# Patient Record
Sex: MALE | Race: WHITE | Employment: FULL TIME | URBAN - NONMETROPOLITAN AREA
[De-identification: names, ages, dates, MRNs, and addresses within clinical notes are randomized per-mention and may not be internally consistent; named-entity substitution may affect disease eponyms.]

---

## 2018-06-26 ENCOUNTER — HOSPITAL ENCOUNTER (OUTPATIENT)
Dept: LAB | Age: 43
Setting detail: SPECIMEN
Discharge: HOME | End: 2018-06-26
Payer: COMMERCIAL

## 2018-06-26 ENCOUNTER — OFFICE VISIT (OUTPATIENT)
Dept: FAMILY MEDICINE CLINIC | Age: 43
End: 2018-06-26
Payer: COMMERCIAL

## 2018-06-26 VITALS
OXYGEN SATURATION: 98 % | WEIGHT: 229.2 LBS | HEIGHT: 72 IN | TEMPERATURE: 98.5 F | SYSTOLIC BLOOD PRESSURE: 122 MMHG | HEART RATE: 68 BPM | BODY MASS INDEX: 31.04 KG/M2 | DIASTOLIC BLOOD PRESSURE: 82 MMHG

## 2018-06-26 DIAGNOSIS — Z00.00 VISIT FOR WELL MAN HEALTH CHECK: Primary | ICD-10-CM

## 2018-06-26 DIAGNOSIS — Z13.220 SCREENING CHOLESTEROL LEVEL: ICD-10-CM

## 2018-06-26 DIAGNOSIS — Z13.1 SCREENING FOR DIABETES MELLITUS: ICD-10-CM

## 2018-06-26 DIAGNOSIS — Z11.4 SCREENING FOR HIV (HUMAN IMMUNODEFICIENCY VIRUS): ICD-10-CM

## 2018-06-26 DIAGNOSIS — M62.08 DIASTASIS RECTI: ICD-10-CM

## 2018-06-26 LAB
ANION GAP SERPL CALCULATED.3IONS-SCNC: 10 MMOL/L (ref 9–17)
BUN BLDV-MCNC: 9 MG/DL (ref 6–20)
BUN/CREAT BLD: 11 (ref 9–20)
CALCIUM SERPL-MCNC: 9.6 MG/DL (ref 8.6–10.4)
CHLORIDE BLD-SCNC: 99 MMOL/L (ref 98–107)
CHOLESTEROL/HDL RATIO: 7.5
CHOLESTEROL: 233 MG/DL
CO2: 32 MMOL/L (ref 20–31)
CREAT SERPL-MCNC: 0.82 MG/DL (ref 0.7–1.2)
ESTIMATED AVERAGE GLUCOSE: 103 MG/DL
GFR AFRICAN AMERICAN: >60 ML/MIN
GFR NON-AFRICAN AMERICAN: >60 ML/MIN
GFR SERPL CREATININE-BSD FRML MDRD: ABNORMAL ML/MIN/{1.73_M2}
GFR SERPL CREATININE-BSD FRML MDRD: ABNORMAL ML/MIN/{1.73_M2}
GLUCOSE BLD-MCNC: 94 MG/DL (ref 70–99)
HBA1C MFR BLD: 5.2 % (ref 4.8–5.9)
HDLC SERPL-MCNC: 31 MG/DL
HIV AG/AB: NONREACTIVE
LDL CHOLESTEROL: 145 MG/DL (ref 0–130)
POTASSIUM SERPL-SCNC: 4.2 MMOL/L (ref 3.7–5.3)
SODIUM BLD-SCNC: 141 MMOL/L (ref 135–144)
TRIGL SERPL-MCNC: 283 MG/DL
VLDLC SERPL CALC-MCNC: ABNORMAL MG/DL (ref 1–30)

## 2018-06-26 PROCEDURE — 83036 HEMOGLOBIN GLYCOSYLATED A1C: CPT

## 2018-06-26 PROCEDURE — 87389 HIV-1 AG W/HIV-1&-2 AB AG IA: CPT

## 2018-06-26 PROCEDURE — 99386 PREV VISIT NEW AGE 40-64: CPT | Performed by: FAMILY MEDICINE

## 2018-06-26 PROCEDURE — 80048 BASIC METABOLIC PNL TOTAL CA: CPT

## 2018-06-26 PROCEDURE — 80061 LIPID PANEL: CPT

## 2018-06-26 PROCEDURE — 36415 COLL VENOUS BLD VENIPUNCTURE: CPT

## 2018-06-26 ASSESSMENT — ENCOUNTER SYMPTOMS
WHEEZING: 0
SINUS PRESSURE: 0
DIARRHEA: 0
SHORTNESS OF BREATH: 0
BACK PAIN: 0
CONSTIPATION: 0
COUGH: 0
ABDOMINAL PAIN: 0
CHEST TIGHTNESS: 0

## 2018-06-26 ASSESSMENT — PATIENT HEALTH QUESTIONNAIRE - PHQ9
1. LITTLE INTEREST OR PLEASURE IN DOING THINGS: 0
SUM OF ALL RESPONSES TO PHQ QUESTIONS 1-9: 0
SUM OF ALL RESPONSES TO PHQ9 QUESTIONS 1 & 2: 0
2. FEELING DOWN, DEPRESSED OR HOPELESS: 0

## 2019-09-24 ENCOUNTER — OFFICE VISIT (OUTPATIENT)
Dept: FAMILY MEDICINE CLINIC | Age: 44
End: 2019-09-24
Payer: COMMERCIAL

## 2019-09-24 VITALS
BODY MASS INDEX: 31.86 KG/M2 | HEART RATE: 66 BPM | WEIGHT: 235.2 LBS | HEIGHT: 72 IN | DIASTOLIC BLOOD PRESSURE: 82 MMHG | SYSTOLIC BLOOD PRESSURE: 118 MMHG | OXYGEN SATURATION: 97 %

## 2019-09-24 DIAGNOSIS — Z13.1 SCREENING FOR DIABETES MELLITUS: ICD-10-CM

## 2019-09-24 DIAGNOSIS — Z13.220 SCREENING CHOLESTEROL LEVEL: ICD-10-CM

## 2019-09-24 DIAGNOSIS — Z00.00 VISIT FOR WELL MAN HEALTH CHECK: Primary | ICD-10-CM

## 2019-09-24 DIAGNOSIS — M72.2 PLANTAR FASCIITIS: ICD-10-CM

## 2019-09-24 PROCEDURE — 99396 PREV VISIT EST AGE 40-64: CPT | Performed by: FAMILY MEDICINE

## 2019-09-24 ASSESSMENT — ENCOUNTER SYMPTOMS
ABDOMINAL PAIN: 1
SHORTNESS OF BREATH: 0
DIARRHEA: 0
BACK PAIN: 0
CONSTIPATION: 0
WHEEZING: 0
CHEST TIGHTNESS: 0
COUGH: 0

## 2019-09-24 ASSESSMENT — PATIENT HEALTH QUESTIONNAIRE - PHQ9
SUM OF ALL RESPONSES TO PHQ QUESTIONS 1-9: 0
2. FEELING DOWN, DEPRESSED OR HOPELESS: 0
1. LITTLE INTEREST OR PLEASURE IN DOING THINGS: 0
SUM OF ALL RESPONSES TO PHQ9 QUESTIONS 1 & 2: 0
SUM OF ALL RESPONSES TO PHQ QUESTIONS 1-9: 0

## 2019-09-24 NOTE — PROGRESS NOTES
Bowel sounds are normal. He exhibits no distension. There is no tenderness. There is no guarding. Musculoskeletal: Normal range of motion. He exhibits no edema. Right foot: There is tenderness. There is no bony tenderness, no swelling, no crepitus and no deformity. Feet:    Lymphadenopathy:     He has no cervical adenopathy. Neurological: He is alert and oriented to person, place, and time. Skin: Skin is warm and dry. No rash noted. Psychiatric: He has a normal mood and affect. His behavior is normal. Judgment normal.   Nursing note and vitals reviewed. /82 (Site: Left Upper Arm, Position: Sitting, Cuff Size: Medium Adult)   Pulse 66   Ht 6' (1.829 m)   Wt 235 lb 3.2 oz (106.7 kg)   SpO2 97%   BMI 31.90 kg/m²     Assessment:       Diagnosis Orders   1. Visit for well man health check     2. Plantar fasciitis     3. Screening cholesterol level  Lipid Panel   4. Screening for diabetes mellitus  Hemoglobin C7U    Basic Metabolic Panel             Plan:        Well man: he is doing very well. I encouraged him to continue staying active and eating a healthy diet. He is due for labs so those were ordered. Plantar fasciitis: new; I recommended exercises and freezing a water bottle and running his foot over it to help with the pain. he was advised to let me know if his pain does not improve within the next few weeks. Return in about 1 year (around 9/24/2020) for Well man.     Orders Placed This Encounter   Procedures    Hemoglobin A1C     Standing Status:   Future     Standing Expiration Date:   9/24/2020    Basic Metabolic Panel     Standing Status:   Future     Standing Expiration Date:   9/24/2020    Lipid Panel     Standing Status:   Future     Standing Expiration Date:   9/24/2020     Order Specific Question:   Is Patient Fasting?/# of Hours     Answer:   non fasting per Dr Zuly Meier This Encounter   Medications    Tdap (ADACEL) 5-2-15.5 LF-MCG/0.5 injection Sig: Inject 0.5 mLs into the muscle once for 1 dose     Dispense:  0.5 mL     Refill:  0    zoster recombinant adjuvanted vaccine (SHINGRIX) 50 MCG/0.5ML SUSR injection     Sig: Inject 0.5 mLs into the muscle once for 1 dose     Dispense:  0.5 mL     Refill:  1       Patientgiven educational materials - see patient instructions. Discussed use, benefit,and side effects of prescribed medications. All patient questions answered. Ptvoiced understanding. Reviewed health maintenance. Instructed to continue currentmedications, diet and exercise. Patient agreed with treatment plan. Follow up asdirected.      Electronically signed by Earle Villeda MD on 9/24/2019 at 9:13 AM

## 2020-05-20 ENCOUNTER — PATIENT MESSAGE (OUTPATIENT)
Dept: FAMILY MEDICINE CLINIC | Age: 45
End: 2020-05-20

## 2020-05-20 RX ORDER — CIPROFLOXACIN 500 MG/1
500 TABLET, FILM COATED ORAL 2 TIMES DAILY
Qty: 20 TABLET | Refills: 0 | Status: SHIPPED | OUTPATIENT
Start: 2020-05-20 | End: 2020-06-18 | Stop reason: ALTCHOICE

## 2020-05-20 RX ORDER — METRONIDAZOLE 500 MG/1
500 TABLET ORAL 3 TIMES DAILY
Qty: 30 TABLET | Refills: 0 | Status: SHIPPED | OUTPATIENT
Start: 2020-05-20 | End: 2020-05-30

## 2020-05-28 ENCOUNTER — OFFICE VISIT (OUTPATIENT)
Dept: FAMILY MEDICINE CLINIC | Age: 45
End: 2020-05-28
Payer: COMMERCIAL

## 2020-05-28 ENCOUNTER — HOSPITAL ENCOUNTER (OUTPATIENT)
Dept: LAB | Age: 45
Discharge: HOME | End: 2020-05-28
Payer: COMMERCIAL

## 2020-05-28 VITALS
RESPIRATION RATE: 18 BRPM | OXYGEN SATURATION: 99 % | WEIGHT: 235 LBS | HEART RATE: 65 BPM | TEMPERATURE: 97.3 F | BODY MASS INDEX: 31.83 KG/M2 | HEIGHT: 72 IN | DIASTOLIC BLOOD PRESSURE: 86 MMHG | SYSTOLIC BLOOD PRESSURE: 122 MMHG

## 2020-05-28 LAB
ABSOLUTE EOS #: 0.2 K/UL (ref 0–0.44)
ABSOLUTE IMMATURE GRANULOCYTE: 0.03 K/UL (ref 0–0.3)
ABSOLUTE LYMPH #: 2.09 K/UL (ref 1.1–3.7)
ABSOLUTE MONO #: 0.95 K/UL (ref 0.1–1.2)
ALBUMIN SERPL-MCNC: 4.5 G/DL (ref 3.5–5.2)
ALBUMIN/GLOBULIN RATIO: 1.5 (ref 1–2.5)
ALP BLD-CCNC: 60 U/L (ref 40–129)
ALT SERPL-CCNC: 59 U/L (ref 5–41)
ANION GAP SERPL CALCULATED.3IONS-SCNC: 10 MMOL/L (ref 9–17)
AST SERPL-CCNC: 37 U/L
BASOPHILS # BLD: 1 % (ref 0–2)
BASOPHILS ABSOLUTE: 0.05 K/UL (ref 0–0.2)
BILIRUB SERPL-MCNC: 0.56 MG/DL (ref 0.3–1.2)
BUN BLDV-MCNC: 13 MG/DL (ref 6–20)
BUN/CREAT BLD: 15 (ref 9–20)
CALCIUM SERPL-MCNC: 9 MG/DL (ref 8.6–10.4)
CHLORIDE BLD-SCNC: 102 MMOL/L (ref 98–107)
CHOLESTEROL/HDL RATIO: 5.6
CHOLESTEROL: 189 MG/DL
CO2: 27 MMOL/L (ref 20–31)
CREAT SERPL-MCNC: 0.86 MG/DL (ref 0.7–1.2)
DIFFERENTIAL TYPE: ABNORMAL
EOSINOPHILS RELATIVE PERCENT: 3 % (ref 1–4)
ESTIMATED AVERAGE GLUCOSE: 105 MG/DL
GFR AFRICAN AMERICAN: >60 ML/MIN
GFR NON-AFRICAN AMERICAN: >60 ML/MIN
GFR SERPL CREATININE-BSD FRML MDRD: ABNORMAL ML/MIN/{1.73_M2}
GFR SERPL CREATININE-BSD FRML MDRD: ABNORMAL ML/MIN/{1.73_M2}
GLUCOSE BLD-MCNC: 104 MG/DL (ref 70–99)
HBA1C MFR BLD: 5.3 % (ref 4.8–5.9)
HCT VFR BLD CALC: 46.6 % (ref 40.7–50.3)
HDLC SERPL-MCNC: 34 MG/DL
HEMOGLOBIN: 16.1 G/DL (ref 13–17)
IMMATURE GRANULOCYTES: 0 %
LDL CHOLESTEROL: 117 MG/DL (ref 0–130)
LYMPHOCYTES # BLD: 30 % (ref 24–43)
MCH RBC QN AUTO: 29.7 PG (ref 25.2–33.5)
MCHC RBC AUTO-ENTMCNC: 34.5 G/DL (ref 25.2–33.5)
MCV RBC AUTO: 85.8 FL (ref 82.6–102.9)
MONOCYTES # BLD: 14 % (ref 3–12)
NRBC AUTOMATED: 0 PER 100 WBC
PDW BLD-RTO: 13.2 % (ref 11.8–14.4)
PLATELET # BLD: 177 K/UL (ref 138–453)
PLATELET ESTIMATE: ABNORMAL
PMV BLD AUTO: 11.6 FL (ref 8.1–13.5)
POTASSIUM SERPL-SCNC: 4.3 MMOL/L (ref 3.7–5.3)
RBC # BLD: 5.43 M/UL (ref 4.21–5.77)
RBC # BLD: ABNORMAL 10*6/UL
SEG NEUTROPHILS: 52 % (ref 36–65)
SEGMENTED NEUTROPHILS ABSOLUTE COUNT: 3.62 K/UL (ref 1.5–8.1)
SODIUM BLD-SCNC: 139 MMOL/L (ref 135–144)
TOTAL PROTEIN: 7.5 G/DL (ref 6.4–8.3)
TRIGL SERPL-MCNC: 190 MG/DL
VLDLC SERPL CALC-MCNC: ABNORMAL MG/DL (ref 1–30)
WBC # BLD: 6.9 K/UL (ref 3.5–11.3)
WBC # BLD: ABNORMAL 10*3/UL

## 2020-05-28 PROCEDURE — 80053 COMPREHEN METABOLIC PANEL: CPT

## 2020-05-28 PROCEDURE — 83036 HEMOGLOBIN GLYCOSYLATED A1C: CPT

## 2020-05-28 PROCEDURE — 85025 COMPLETE CBC W/AUTO DIFF WBC: CPT

## 2020-05-28 PROCEDURE — 99214 OFFICE O/P EST MOD 30 MIN: CPT | Performed by: FAMILY MEDICINE

## 2020-05-28 PROCEDURE — 80061 LIPID PANEL: CPT

## 2020-05-28 PROCEDURE — 36415 COLL VENOUS BLD VENIPUNCTURE: CPT

## 2020-05-28 ASSESSMENT — ENCOUNTER SYMPTOMS
FLATUS: 1
BLOOD IN STOOL: 1
CHEST TIGHTNESS: 0
VOMITING: 0
ABDOMINAL DISTENTION: 0
BELCHING: 0
WHEEZING: 0
SHORTNESS OF BREATH: 0
NAUSEA: 0
DIARRHEA: 0
HEMATOCHEZIA: 1
COUGH: 0
ABDOMINAL PAIN: 1
CONSTIPATION: 0

## 2020-05-28 ASSESSMENT — PATIENT HEALTH QUESTIONNAIRE - PHQ9
1. LITTLE INTEREST OR PLEASURE IN DOING THINGS: 0
SUM OF ALL RESPONSES TO PHQ9 QUESTIONS 1 & 2: 0
2. FEELING DOWN, DEPRESSED OR HOPELESS: 0
SUM OF ALL RESPONSES TO PHQ QUESTIONS 1-9: 0
SUM OF ALL RESPONSES TO PHQ QUESTIONS 1-9: 0

## 2020-05-28 NOTE — PROGRESS NOTES
and Tuesday he had nathan red blood in his stool. He stated he has not had any fevers. He had a colonoscopy in 2011 that showed colitis with chronic appearance with some concern for UC. He also had divericuli at that time. Past Medical History:   Diagnosis Date    Allergic rhinitis     spring allergies     Past Surgical History:   Procedure Laterality Date    HERNIA REPAIR  2007       Family History   Problem Relation Age of Onset    High Cholesterol Father     Cancer Paternal Grandmother     Early Death Paternal Grandmother     Heart Attack Paternal Grandfather        Social History     Tobacco Use    Smoking status: Never Smoker    Smokeless tobacco: Never Used   Substance Use Topics    Alcohol use: No      Prior to Visit Medications    Medication Sig Taking? Authorizing Provider   ciprofloxacin (CIPRO) 500 MG tablet Take 1 tablet by mouth 2 times daily Yes Zeynep Rogers MD   metroNIDAZOLE (FLAGYL) 500 MG tablet Take 1 tablet by mouth 3 times daily for 10 days Yes Zeynep Rogers MD   Fexofenadine-Pseudoephedrine (ALLEGRA-D 12 HOUR PO) Take 1 tablet by mouth daily Yes Historical Provider, MD     No Known Allergies    Subjective:      Review of Systems   Constitutional: Negative for activity change, appetite change, chills, fatigue and fever. Eyes: Negative for visual disturbance. Respiratory: Negative for cough, chest tightness, shortness of breath and wheezing. Cardiovascular: Negative for chest pain, palpitations and leg swelling. Gastrointestinal: Positive for abdominal pain, blood in stool, flatus, hematochezia and melena. Negative for abdominal distention, anorexia, constipation, diarrhea, nausea and vomiting. Genitourinary: Negative for difficulty urinating and hematuria. Skin: Negative for rash. Neurological: Negative for dizziness, syncope, weakness and light-headedness. Objective:     Physical Exam  Vitals signs and nursing note reviewed.    Constitutional:

## 2020-05-29 ENCOUNTER — HOSPITAL ENCOUNTER (OUTPATIENT)
Dept: CT IMAGING | Age: 45
Discharge: HOME | End: 2020-05-31
Payer: COMMERCIAL

## 2020-05-29 ENCOUNTER — OFFICE VISIT (OUTPATIENT)
Dept: SURGERY | Age: 45
End: 2020-05-29
Payer: COMMERCIAL

## 2020-05-29 VITALS
RESPIRATION RATE: 16 BRPM | SYSTOLIC BLOOD PRESSURE: 130 MMHG | HEIGHT: 72 IN | WEIGHT: 232 LBS | TEMPERATURE: 96.8 F | HEART RATE: 68 BPM | DIASTOLIC BLOOD PRESSURE: 86 MMHG | BODY MASS INDEX: 31.42 KG/M2

## 2020-05-29 PROCEDURE — 6360000004 HC RX CONTRAST MEDICATION: Performed by: FAMILY MEDICINE

## 2020-05-29 PROCEDURE — 99203 OFFICE O/P NEW LOW 30 MIN: CPT | Performed by: SURGERY

## 2020-05-29 PROCEDURE — 2709999900 CT ABDOMEN PELVIS W IV CONTRAST

## 2020-05-29 RX ADMIN — IOPAMIDOL 100 ML: 755 INJECTION, SOLUTION INTRAVENOUS at 09:19

## 2020-05-29 RX ADMIN — IOHEXOL 50 ML: 240 INJECTION, SOLUTION INTRATHECAL; INTRAVASCULAR; INTRAVENOUS; ORAL at 09:19

## 2020-05-29 ASSESSMENT — ENCOUNTER SYMPTOMS
SINUS PRESSURE: 0
ABDOMINAL PAIN: 1
CONSTIPATION: 0
CHEST TIGHTNESS: 0
TROUBLE SWALLOWING: 0
VOICE CHANGE: 0
HEMATOCHEZIA: 1
VOMITING: 0
BACK PAIN: 1
BLOOD IN STOOL: 1
NAUSEA: 0
RECTAL PAIN: 0
DIFFICULTY BREATHING: 0
COUGH: 0
ABDOMINAL DISTENTION: 0
ANAL BLEEDING: 0
DIARRHEA: 0
SHORTNESS OF BREATH: 0
WHEEZING: 0
SORE THROAT: 0
EYES NEGATIVE: 1

## 2020-05-29 NOTE — PATIENT INSTRUCTIONS
fluid after the test to replace the fluids you may have lost during the colon prep. But don't drink alcohol. Your doctor will talk to you about when you'll need your next colonoscopy. The results of your test and your risk for colorectal cancer will help your doctor decide how often you need to be checked. After the test, you may be bloated or have gas pains. You may need to pass gas. If a biopsy was done or a polyp was removed, you may have streaks of blood in your stool (feces) for a few days. If polyps were taken out, your doctor may tell you to avoid taking aspirin and nonsteroidal anti-inflammatory drugs (NSAIDs) for 7 to 14 days. Problems such as heavy rectal bleeding may not occur until several weeks after the test. This isn't common. But it can happen after polyps are removed. Follow-up care is a key part of your treatment and safety. Be sure to make and go to all appointments, and call your doctor if you are having problems. It's also a good idea to know your test results and keep a list of the medicines you take. Where can you learn more? Go to [email or web address] and sign in to your [employer] account. Enter N063 in the [facility] box to learn more about \"Learning About Colonoscopy. \"     If you do not have an account, please click on the \"Sign Up Now\" link. Current as of: August 22, 2019               Content Version: 12.5  © 4087-9016 [employer]wise, Incorporated. Care instructions adapted under license by [facility] ([facility]). If you have questions about a medical condition or this instruction, always ask your healthcare professional. [place] [address] any warranty or liability for your use of this information.

## 2020-05-29 NOTE — PROGRESS NOTES
sigmoidoscopy were discussed. he was also given the opportunity to have any questions answered, and encouraged to call the office with additional issues.          Lea Lynne MD

## 2020-05-29 NOTE — LETTER
AMBULATORY SURGERY INSTRUCTIONS    - If you should develop a cold, sore throat, cough, fever or other new indication of illness prior to the scheduled surgery, please notify the [address] [place] [address] office as early as possible. - DO NOT EAT OR DRINK ANYTHING AFTER MIDNIGHT THE NIGHT BEFORE YOUR SURGERY. This includes water, tea, juice, cereal, coffee, etc.    - Refrain from smoking the day of your surgery or procedure. - Wear simple loose clothing, which can be easily changed. - Do not wear any make-up, lipstick or nail polish. - Please leave contact lenses at home or bring container for them. - Leave jewelry (including rings) and other valuables at home. - Make arrangements for a family member or friend to drive you to and from the surgery center. The anesthetic may affect your judgement following surgery and driving a vehicle within 24 hours after the anesthesia could be dangerous. Please remember that a responsible adult must remain in the building at all times during your surgery. - Children should be accompanied by two adults; one to watch the child, the other to drive the vehicle home. - Please shower or bathe both on the evening prior to surgery and on the morning of surgery using an antibacterial soap/Hibiclens    - You may be asked to sign several forms prior to surgery; patients under age 25 have a parent or legal guardian sign the permit to operate.    - DO NOT take aspirin, Aleve, Motrin, Ibuprofen, Naproxen, Vitamins or Herbal supplements for 1 weeks or 7 days prior to the day of surgery.    -The morning of your procedure please take blood pressure, heart, and seizure medications with a small sip of water. Day of procedure report to the [facility], Registration office on the 1st floor in the hospital.  [facility] requests that all medications are kept in their original bottles and brought to the procedure.

## 2020-06-03 ENCOUNTER — TELEPHONE (OUTPATIENT)
Dept: SURGERY | Age: 45
End: 2020-06-03

## 2020-06-03 RX ORDER — BISACODYL 5 MG
10 TABLET, DELAYED RELEASE (ENTERIC COATED) ORAL ONCE
Qty: 2 TABLET | Refills: 0 | Status: SHIPPED | OUTPATIENT
Start: 2020-06-03 | End: 2020-06-03

## 2020-06-03 RX ORDER — POLYETHYLENE GLYCOL 3350, SODIUM CHLORIDE, SODIUM BICARBONATE, POTASSIUM CHLORIDE 420; 11.2; 5.72; 1.48 G/4L; G/4L; G/4L; G/4L
4000 POWDER, FOR SOLUTION ORAL ONCE
Qty: 4000 ML | Refills: 0 | Status: SHIPPED | OUTPATIENT
Start: 2020-06-03 | End: 2020-06-03

## 2020-06-10 ENCOUNTER — TELEPHONE (OUTPATIENT)
Dept: SURGERY | Age: 45
End: 2020-06-10

## 2020-06-10 RX ORDER — MESALAMINE 1000 MG/1
1000 SUPPOSITORY RECTAL NIGHTLY
Qty: 30 SUPPOSITORY | Refills: 3 | Status: SHIPPED | OUTPATIENT
Start: 2020-06-10 | End: 2021-11-02

## 2020-06-18 ENCOUNTER — OFFICE VISIT (OUTPATIENT)
Dept: SURGERY | Age: 45
End: 2020-06-18
Payer: COMMERCIAL

## 2020-06-18 VITALS
HEART RATE: 62 BPM | BODY MASS INDEX: 31.56 KG/M2 | DIASTOLIC BLOOD PRESSURE: 76 MMHG | HEIGHT: 72 IN | TEMPERATURE: 97.2 F | SYSTOLIC BLOOD PRESSURE: 128 MMHG | WEIGHT: 233 LBS

## 2020-06-18 PROCEDURE — 99212 OFFICE O/P EST SF 10 MIN: CPT | Performed by: SURGERY

## 2020-06-18 NOTE — PROGRESS NOTES
Patient comes in today to discuss the results of his colonoscopy. He had 2 significant findings #1 he has a short segment of proctitis. This probably is similar to what was found on him in 2011 and treated with suppositories. This is consistent with ulcerative proctitis but very mild and limited amount of disease. Only with ulcerative colitis we worry a lot about malignant deterioration over time severe blood loss etc.  He is not likely to have any of these things. We did start the mesalamine suppositories a few days ago. He has not noticed any difference in his symptoms however he was already feeling better before starting them. 2.  He does have sigmoid diverticulosis so it certainly possible that he could have had diverticulitis. Also possible the abdominal pain he was experiencing was due to the inflammation in his rectum. We will plan on seeing him back in about 3 months as doing dramatically. May taper him down to twice a week on the suppositories if he is doing well. Information given on ulcerative colitis. Today's visit was entirely a counseling session last about 10 minutes.

## 2020-09-18 ENCOUNTER — OFFICE VISIT (OUTPATIENT)
Dept: SURGERY | Age: 45
End: 2020-09-18
Payer: COMMERCIAL

## 2020-09-18 VITALS
RESPIRATION RATE: 18 BRPM | BODY MASS INDEX: 31.99 KG/M2 | HEIGHT: 72 IN | SYSTOLIC BLOOD PRESSURE: 128 MMHG | DIASTOLIC BLOOD PRESSURE: 80 MMHG | WEIGHT: 236.2 LBS | HEART RATE: 68 BPM | TEMPERATURE: 96 F

## 2020-09-18 PROCEDURE — 99212 OFFICE O/P EST SF 10 MIN: CPT | Performed by: SURGERY

## 2020-09-18 NOTE — PROGRESS NOTES
No rectal bleeding  No abdominal pain  Rare diarrhea    Using mesalamine suppositories 1 - 2 times a week. No other issues at this time    Will seem him back in about 9 months when it is time for a refill.

## 2020-09-28 ENCOUNTER — OFFICE VISIT (OUTPATIENT)
Dept: FAMILY MEDICINE CLINIC | Age: 45
End: 2020-09-28
Payer: COMMERCIAL

## 2020-09-28 VITALS
OXYGEN SATURATION: 98 % | WEIGHT: 231 LBS | DIASTOLIC BLOOD PRESSURE: 88 MMHG | BODY MASS INDEX: 31.29 KG/M2 | HEART RATE: 60 BPM | HEIGHT: 72 IN | RESPIRATION RATE: 18 BRPM | TEMPERATURE: 97.8 F | SYSTOLIC BLOOD PRESSURE: 132 MMHG

## 2020-09-28 PROBLEM — K92.1 HEMATOCHEZIA: Status: ACTIVE | Noted: 2020-09-28

## 2020-09-28 PROBLEM — R10.32 LEFT LOWER QUADRANT PAIN: Status: ACTIVE | Noted: 2020-09-28

## 2020-09-28 PROCEDURE — 99396 PREV VISIT EST AGE 40-64: CPT | Performed by: FAMILY MEDICINE

## 2020-09-28 ASSESSMENT — ENCOUNTER SYMPTOMS
SHORTNESS OF BREATH: 0
SINUS COMPLAINT: 1
COUGH: 0
SORE THROAT: 0
WHEEZING: 0
CHEST TIGHTNESS: 0
SINUS PRESSURE: 1

## 2020-09-28 ASSESSMENT — PATIENT HEALTH QUESTIONNAIRE - PHQ9
2. FEELING DOWN, DEPRESSED OR HOPELESS: 0
1. LITTLE INTEREST OR PLEASURE IN DOING THINGS: 0
SUM OF ALL RESPONSES TO PHQ QUESTIONS 1-9: 0
SUM OF ALL RESPONSES TO PHQ9 QUESTIONS 1 & 2: 0
SUM OF ALL RESPONSES TO PHQ QUESTIONS 1-9: 0

## 2020-09-28 NOTE — PROGRESS NOTES
[facility]  [address] [address]  Dept: [phone]  Dept Fax: [phone]  Loc: [phone]    Shiela Calzada is a 53 y.o. male who presents today for his medical conditions/complaints as noted below. Shiela Calzada is c/o of   Chief Complaint   Patient presents with    Annual Exam     discuss arthritis    Sinus Problem       HPI:     Here today for well visit ans he is having sinus pressure and hand pain. Sinus Problem   This is a new problem. The current episode started 1 to 4 weeks ago (2 weeks). The problem is unchanged. Associated symptoms include headaches and sinus pressure. Pertinent negatives include no chills, congestion, coughing, ear pain, shortness of breath or sore throat. Treatments tried: rarely antihistamine. The treatment provided no relief. He has been having problems with arthritis in his hands. The pain starts in the morning and lasts most of the day. He occasionally takes tylenol for it. He plays guitar and that makes it hurt more. The pain is worse in the PIP joints of the index fingers and the left wrist. His maternal grandma has RA. His dad has arthritis but not sure it is RA. Past Medical History:   Diagnosis Date    Allergic rhinitis     spring allergies          Social History     Tobacco Use    Smoking status: Never Smoker    Smokeless tobacco: Never Used   Substance Use Topics    Alcohol use: No     Current Outpatient Medications   Medication Sig Dispense Refill    Triamcinolone Acetonide (NASACORT ALLERGY 24HR NA)       Fexofenadine HCl (ALLEGRA ALLERGY PO) Take 1 tablet by mouth      mesalamine (CANASA) 1000 MG suppository Place 1 suppository rectally nightly 30 suppository 3     No current facility-administered medications for this visit.          No Known Allergies    Subjective:     Review of Systems   Constitutional: Negative for activity change, appetite change, chills, fatigue and fever. HENT: Positive for sinus pressure. Negative for congestion, ear pain and sore throat. Eyes: Negative for visual disturbance. Respiratory: Negative for cough, chest tightness, shortness of breath and wheezing. Cardiovascular: Negative for chest pain, palpitations and leg swelling. Genitourinary: Negative for difficulty urinating. Musculoskeletal: Positive for arthralgias (in bilateral hands). Skin: Negative for rash. Neurological: Positive for headaches. Negative for dizziness, syncope, weakness and light-headedness. Psychiatric/Behavioral: Negative for decreased concentration and sleep disturbance. The patient is not nervous/anxious. Objective:      Physical Exam  Vitals signs and nursing note reviewed. Constitutional:       General: He is not in acute distress. Appearance: He is well-developed. HENT:      Right Ear: Tympanic membrane, ear canal and external ear normal. There is no impacted cerumen. Left Ear: Tympanic membrane, ear canal and external ear normal. There is no impacted cerumen. Eyes:      Conjunctiva/sclera: Conjunctivae normal.   Neck:      Musculoskeletal: Normal range of motion and neck supple. Cardiovascular:      Rate and Rhythm: Normal rate and regular rhythm. Heart sounds: Normal heart sounds. No murmur. Pulmonary:      Effort: Pulmonary effort is normal. No respiratory distress. Breath sounds: Normal breath sounds. No wheezing or rales. Musculoskeletal: Normal range of motion. Lymphadenopathy:      Cervical: No cervical adenopathy. Skin:     General: Skin is warm and dry. Findings: No rash. Neurological:      Mental Status: He is alert and oriented to person, place, and time. Psychiatric:         Mood and Affect: Mood normal.         Behavior: Behavior normal.         Thought Content:  Thought content normal.         Judgment: Judgment normal.       /88 (Site: Right Upper Arm, Position: Sitting, Cuff Size: Large Adult)   Pulse 60   Temp 97.8 °F (36.6 °C)   Resp 18   Ht 6' (1.829 m)   Wt 231 lb (104.8 kg)   SpO2 98%   BMI 31.33 kg/m²     Assessment:       Diagnosis Orders   1. Routine general medical examination at a health care facility  Lipid Panel    Basic Metabolic Panel    Hemoglobin A1C   2. Pain in both hands  Rheumatoid Factor    C-Reactive Protein    Sedimentation Rate    JACQUES Screen With Reflex    XR HAND LEFT (MIN 3 VIEWS)    XR HAND RIGHT (MIN 3 VIEWS)   3. Sinus pressure               Plan:        Well man: he is doing pretty well. His abdominal issues are well controlled now that he is on the mesalamine and he has not had any recurrent issues. I encouraged him to get plenty of exercise and to eat well balanced meals. He is a little concerned because his identical twin brother was recently diagnosed with cancer. (type and staging still in process). I told him that he is not guaranteed to have the same issue and I just recommended that he be aware of any changes in his body. Hand pain: worsening; he is really bothered by the pain in his hands and it is affecting his ability to play guitar. I recommended looking for RA and labs and an xray were ordered. I also gave him a sample of voltaren gel to try for pain control. Sinus congestion: new; I recommended he start taking his nasal steroid regularly to try to help. Return in about 1 year (around 9/28/2021) for Well man.     Orders Placed This Encounter   Procedures    XR HAND LEFT (MIN 3 VIEWS)     Standing Status:   Future     Standing Expiration Date:   9/28/2021     Order Specific Question:   Reason for exam:     Answer:   hand pain    XR HAND RIGHT (MIN 3 VIEWS)     Standing Status:   Future     Standing Expiration Date:   9/28/2021     Order Specific Question:   Reason for exam:     Answer:   hand pain    Lipid Panel     Standing Status:   Future     Standing Expiration Date:   9/28/2021     Order Specific Question:   Is Patient Fasting?/# of Hours     Answer:   no per Dr. Reshma Montaño Basic Metabolic Panel     Standing Status:   Future     Standing Expiration Date:   9/28/2021    Hemoglobin A1C     Standing Status:   Future     Standing Expiration Date:   9/28/2021    Rheumatoid Factor     Standing Status:   Future     Standing Expiration Date:   9/28/2021    C-Reactive Protein     Standing Status:   Future     Standing Expiration Date:   9/28/2021    Sedimentation Rate     Standing Status:   Future     Standing Expiration Date:   9/28/2021    JACQUES Screen With Reflex     Standing Status:   Future     Standing Expiration Date:   9/28/2021         Patientgiven educational materials - see patient instructions. Discussed use, benefit,and side effects of prescribed medications. All patient questions answered. Ptvoiced understanding. Reviewed health maintenance. Instructed to continue currentmedications, diet and exercise. Patient agreed with treatment plan. Follow up asdirected.      Electronically signed by Cesar Salmon MD on 9/28/2020 at 8:55 AM

## 2021-01-08 ENCOUNTER — OFFICE VISIT (OUTPATIENT)
Dept: FAMILY MEDICINE CLINIC | Age: 46
End: 2021-01-08
Payer: COMMERCIAL

## 2021-01-08 ENCOUNTER — HOSPITAL ENCOUNTER (OUTPATIENT)
Dept: LAB | Age: 46
Discharge: HOME | End: 2021-01-08
Payer: COMMERCIAL

## 2021-01-08 VITALS
BODY MASS INDEX: 32.37 KG/M2 | TEMPERATURE: 98.2 F | DIASTOLIC BLOOD PRESSURE: 80 MMHG | OXYGEN SATURATION: 98 % | SYSTOLIC BLOOD PRESSURE: 122 MMHG | HEIGHT: 72 IN | WEIGHT: 239 LBS | HEART RATE: 74 BPM

## 2021-01-08 DIAGNOSIS — H92.02 ACUTE OTALGIA, LEFT: Primary | ICD-10-CM

## 2021-01-08 DIAGNOSIS — M79.641 PAIN IN BOTH HANDS: ICD-10-CM

## 2021-01-08 DIAGNOSIS — M79.642 PAIN IN BOTH HANDS: ICD-10-CM

## 2021-01-08 LAB
C-REACTIVE PROTEIN: <3 MG/L (ref 0–5)
RHEUMATOID FACTOR: <10 IU/ML
SEDIMENTATION RATE, ERYTHROCYTE: 1 MM (ref 0–15)

## 2021-01-08 PROCEDURE — 36415 COLL VENOUS BLD VENIPUNCTURE: CPT

## 2021-01-08 PROCEDURE — 86140 C-REACTIVE PROTEIN: CPT

## 2021-01-08 PROCEDURE — 86038 ANTINUCLEAR ANTIBODIES: CPT

## 2021-01-08 PROCEDURE — 99214 OFFICE O/P EST MOD 30 MIN: CPT | Performed by: FAMILY MEDICINE

## 2021-01-08 PROCEDURE — 85651 RBC SED RATE NONAUTOMATED: CPT

## 2021-01-08 PROCEDURE — 86431 RHEUMATOID FACTOR QUANT: CPT

## 2021-01-08 RX ORDER — PREDNISONE 20 MG/1
20 TABLET ORAL DAILY
Qty: 10 TABLET | Refills: 0 | Status: SHIPPED | OUTPATIENT
Start: 2021-01-08 | End: 2021-01-13

## 2021-01-08 RX ORDER — AMOXICILLIN 500 MG/1
500 CAPSULE ORAL 2 TIMES DAILY
Qty: 20 CAPSULE | Refills: 0 | Status: SHIPPED | OUTPATIENT
Start: 2021-01-08 | End: 2021-01-18

## 2021-01-08 ASSESSMENT — PATIENT HEALTH QUESTIONNAIRE - PHQ9
SUM OF ALL RESPONSES TO PHQ QUESTIONS 1-9: 0
SUM OF ALL RESPONSES TO PHQ QUESTIONS 1-9: 0
SUM OF ALL RESPONSES TO PHQ9 QUESTIONS 1 & 2: 0
SUM OF ALL RESPONSES TO PHQ QUESTIONS 1-9: 0

## 2021-01-08 ASSESSMENT — ENCOUNTER SYMPTOMS
SINUS PRESSURE: 0
SHORTNESS OF BREATH: 0
SORE THROAT: 0
COUGH: 0
DIARRHEA: 0
ABDOMINAL PAIN: 0

## 2021-01-08 NOTE — PROGRESS NOTES
[facility]  [address] [address]  Dept: [phone]  Dept Fax: [phone]  Loc: [phone]    Gladys Birch is a 50 y.o. male who presents today for his medical conditions/complaints as noted below. Gladys Birch is c/o of   Chief Complaint   Patient presents with    Neck Pain     with right sided ear pressure x 6 weeks       HPI:   Here today for ear pain. Otalgia   There is pain in the right ear. This is a new (8 weeks) problem. The problem occurs constantly. The problem has been unchanged. There has been no fever. Associated symptoms include neck pain. Pertinent negatives include no abdominal pain, coughing, diarrhea, ear discharge, headaches, hearing loss, rash or sore throat. Associated symptoms comments: Sinus pressure. Treatments tried: antihistamine occasionally. The treatment provided no relief. Past Medical History:   Diagnosis Date    Allergic rhinitis     spring allergies          Social History     Tobacco Use    Smoking status: Never Smoker    Smokeless tobacco: Never Used   Substance Use Topics    Alcohol use: No     Current Outpatient Medications   Medication Sig Dispense Refill    amoxicillin (AMOXIL) 500 MG capsule Take 1 capsule by mouth 2 times daily for 10 days 20 capsule 0    predniSONE (DELTASONE) 20 MG tablet Take 1 tablet by mouth daily for 5 days 10 tablet 0    Triamcinolone Acetonide (NASACORT ALLERGY 24HR NA)       Fexofenadine HCl (ALLEGRA ALLERGY PO) Take 1 tablet by mouth      mesalamine (CANASA) 1000 MG suppository Place 1 suppository rectally nightly 30 suppository 3     No current facility-administered medications for this visit. No Known Allergies    Subjective:     Review of Systems   Constitutional: Negative for activity change, appetite change, chills, fatigue and fever. HENT: Positive for ear pain.  Negative for congestion, ear discharge, hearing loss, postnasal drip, sinus pressure, sneezing and sore throat. Eyes: Negative for visual disturbance. Respiratory: Negative for cough and shortness of breath. Cardiovascular: Negative for chest pain and palpitations. Gastrointestinal: Negative for abdominal pain and diarrhea. Musculoskeletal: Positive for neck pain. Skin: Negative for rash. Neurological: Negative for headaches. Objective:      Physical Exam  Vitals signs and nursing note reviewed. Constitutional:       General: He is not in acute distress. Appearance: He is well-developed. HENT:      Right Ear: Tympanic membrane, ear canal and external ear normal. There is no impacted cerumen. Left Ear: Tympanic membrane, ear canal and external ear normal. There is no impacted cerumen. Eyes:      Conjunctiva/sclera: Conjunctivae normal.   Neck:      Musculoskeletal: Normal range of motion and neck supple. Cardiovascular:      Rate and Rhythm: Normal rate and regular rhythm. Heart sounds: Normal heart sounds. No murmur. Pulmonary:      Effort: Pulmonary effort is normal. No respiratory distress. Breath sounds: Normal breath sounds. No wheezing or rales. Musculoskeletal: Normal range of motion. Lymphadenopathy:      Cervical: No cervical adenopathy. Skin:     General: Skin is warm and dry. Findings: No rash. Neurological:      Mental Status: He is alert and oriented to person, place, and time. /80 (Site: Right Upper Arm, Position: Sitting, Cuff Size: Large Adult)   Pulse 74   Temp 98.2 °F (36.8 °C)   Ht 6' (1.829 m)   Wt 239 lb (108.4 kg)   SpO2 98%   BMI 32.41 kg/m²     Assessment:       Diagnosis Orders   1. Acute otalgia, left               Plan:        Otalgia: new; I suspect he had covid a month or so ago which seemed to have started the ear pain. He likely has a sinus infection causing pressure in the ear in addition to eustachian tube dysfunction.  I will treat with amoxicillin and prednisone. If there is no improvement I will have him see ENT. Return in about 8 months (around 9/8/2021) for Well man. Orders Placed This Encounter   Medications    amoxicillin (AMOXIL) 500 MG capsule     Sig: Take 1 capsule by mouth 2 times daily for 10 days     Dispense:  20 capsule     Refill:  0    predniSONE (DELTASONE) 20 MG tablet     Sig: Take 1 tablet by mouth daily for 5 days     Dispense:  10 tablet     Refill:  0       Patientgiven educational materials - see patient instructions. Discussed use, benefit,and side effects of prescribed medications. All patient questions answered. Ptvoiced understanding. Reviewed health maintenance. Instructed to continue currentmedications, diet and exercise. Patient agreed with treatment plan. Follow up asdirected.      Electronically signed by Steven Louise MD on 1/8/2021 at 5:46 PM

## 2021-01-11 LAB — ANTI-NUCLEAR ANTIBODY (ANA): NEGATIVE

## 2021-06-23 ENCOUNTER — TELEPHONE (OUTPATIENT)
Dept: FAMILY MEDICINE CLINIC | Age: 46
End: 2021-06-23

## 2021-09-09 ENCOUNTER — OFFICE VISIT (OUTPATIENT)
Dept: FAMILY MEDICINE CLINIC | Age: 46
End: 2021-09-09
Payer: COMMERCIAL

## 2021-09-09 VITALS
WEIGHT: 240 LBS | TEMPERATURE: 96.6 F | DIASTOLIC BLOOD PRESSURE: 80 MMHG | SYSTOLIC BLOOD PRESSURE: 130 MMHG | HEART RATE: 67 BPM | OXYGEN SATURATION: 98 % | HEIGHT: 72 IN | BODY MASS INDEX: 32.51 KG/M2

## 2021-09-09 DIAGNOSIS — Z00.00 VISIT FOR WELL MAN HEALTH CHECK: Primary | ICD-10-CM

## 2021-09-09 DIAGNOSIS — F32.1 CURRENT MODERATE EPISODE OF MAJOR DEPRESSIVE DISORDER WITHOUT PRIOR EPISODE (HCC): ICD-10-CM

## 2021-09-09 PROCEDURE — 99396 PREV VISIT EST AGE 40-64: CPT | Performed by: FAMILY MEDICINE

## 2021-09-09 RX ORDER — CITALOPRAM 10 MG/1
10 TABLET ORAL DAILY
Qty: 30 TABLET | Refills: 1 | Status: SHIPPED | OUTPATIENT
Start: 2021-09-09 | End: 2021-10-08

## 2021-09-09 ASSESSMENT — ENCOUNTER SYMPTOMS
CHEST TIGHTNESS: 0
COUGH: 0
SHORTNESS OF BREATH: 0
WHEEZING: 0

## 2021-09-09 NOTE — PROGRESS NOTES
[facility] [address]  [address] [address]  Dept: [phone]  Dept Fax: [phone]  Loc: [phone]    Candido Valle is a 50 y.o. male who presents today for his medical conditions/complaints as noted below. Candido Valle is c/o of   Chief Complaint   Patient presents with    Annual Exam       HPI:     HPI Here today for his annual exam.     He has been doing okay. He has been feeling good overall. He has not had any issues with chest pain or shortness of breath. No issues with abdominal pain, constipation or diarrhea. No issues with blood in the stool. He has had some headaches that tend to happen in the back of his head. He does clench his teeth at night. He doesn't have too much eye strain throughout the day. No issues with lightheadedness or dizziness. He has been more grouchy recently. He is not always sleeping well. It seems to be worse at he is getting older. He feels like he restless in his sleep. No trouble falling asleep. He feels like his mood has been worse over the past 3-4 months. Past Medical History:   Diagnosis Date    Allergic rhinitis     spring allergies          Social History     Tobacco Use    Smoking status: Never Smoker    Smokeless tobacco: Never Used   Substance Use Topics    Alcohol use: No     Current Outpatient Medications   Medication Sig Dispense Refill    citalopram (CELEXA) 10 MG tablet Take 1 tablet by mouth daily 30 tablet 1    Triamcinolone Acetonide (NASACORT ALLERGY 24HR NA)       Fexofenadine HCl (ALLEGRA ALLERGY PO) Take 1 tablet by mouth      mesalamine (CANASA) 1000 MG suppository Place 1 suppository rectally nightly (Patient not taking: Reported on 9/9/2021) 30 suppository 3     No current facility-administered medications for this visit.         No Known Allergies    Subjective:     Review of Systems   Constitutional: Negative for activity change, appetite change, chills, fatigue and fever. Eyes: Negative for visual disturbance. Respiratory: Negative for cough, chest tightness, shortness of breath and wheezing. Cardiovascular: Negative for chest pain, palpitations and leg swelling. Genitourinary: Negative for difficulty urinating. Skin: Negative for rash. Neurological: Positive for headaches. Negative for dizziness, syncope, weakness and light-headedness. Psychiatric/Behavioral: Positive for dysphoric mood and sleep disturbance. Negative for agitation and decreased concentration. The patient is not nervous/anxious. Objective:      Physical Exam  Vitals and nursing note reviewed. Constitutional:       General: He is not in acute distress. Appearance: He is well-developed. HENT:      Right Ear: Tympanic membrane, ear canal and external ear normal.      Left Ear: Tympanic membrane, ear canal and external ear normal.   Eyes:      Conjunctiva/sclera: Conjunctivae normal.   Cardiovascular:      Rate and Rhythm: Normal rate and regular rhythm. Heart sounds: Normal heart sounds. No murmur heard. Pulmonary:      Effort: Pulmonary effort is normal. No respiratory distress. Breath sounds: Normal breath sounds. No wheezing or rales. Musculoskeletal:         General: Normal range of motion. Cervical back: Normal range of motion and neck supple. Lymphadenopathy:      Cervical: No cervical adenopathy. Skin:     General: Skin is warm and dry. Findings: No rash. Neurological:      Mental Status: He is alert and oriented to person, place, and time. Psychiatric:         Mood and Affect: Mood normal.         Behavior: Behavior normal.         Thought Content: Thought content normal.         Judgment: Judgment normal.       /80   Pulse 67   Temp 96.6 °F (35.9 °C)   Ht 6' (1.829 m)   Wt 240 lb (108.9 kg)   SpO2 98%   BMI 32.55 kg/m²     Assessment:       Diagnosis Orders   1.  Visit for [facility] check     2. Current moderate episode of major depressive disorder without prior episode ([facility] [address]               Plan:        Well man: he has been doing well overall. I encouraged him to get plenty of exercise and to eat a healthy diet. Depression: new; Davey Reynolds was started on celexa. I explained that the main side effect is GI distress. I will start with a low dose and titrate up as needed. he was instructed that it can take 4-6weeks before the medication is working to its full potential. I will see how he is doing in a month and increase the dose as needed. Return in about 1 month (around 10/9/2021) for Depression follow up. Orders Placed This Encounter   Medications    citalopram (CELEXA) 10 MG tablet     Sig: Take 1 tablet by mouth daily     Dispense:  30 tablet     Refill:  1       Patientgiven educational materials - see patient instructions. Discussed use, benefit,and side effects of prescribed medications. All patient questions answered. Ptvoiced understanding. Reviewed health maintenance. Instructed to continue currentmedications, diet and exercise. Patient agreed with treatment plan. Follow up asdirected.      Electronically signed by Marcia Joyce MD on 9/9/2021 at 8:34 AM

## 2021-10-08 DIAGNOSIS — F32.1 CURRENT MODERATE EPISODE OF MAJOR DEPRESSIVE DISORDER WITHOUT PRIOR EPISODE (HCC): Primary | ICD-10-CM

## 2021-10-08 RX ORDER — CITALOPRAM 10 MG/1
10 TABLET ORAL DAILY
Qty: 30 TABLET | Refills: 1 | Status: SHIPPED | OUTPATIENT
Start: 2021-10-08 | End: 2021-11-02 | Stop reason: SDUPTHER

## 2021-11-02 ENCOUNTER — VIRTUAL VISIT (OUTPATIENT)
Dept: FAMILY MEDICINE CLINIC | Age: 46
End: 2021-11-02
Payer: COMMERCIAL

## 2021-11-02 DIAGNOSIS — F32.1 CURRENT MODERATE EPISODE OF MAJOR DEPRESSIVE DISORDER WITHOUT PRIOR EPISODE (HCC): ICD-10-CM

## 2021-11-02 PROCEDURE — 99213 OFFICE O/P EST LOW 20 MIN: CPT | Performed by: FAMILY MEDICINE

## 2021-11-02 RX ORDER — CITALOPRAM 10 MG/1
10 TABLET ORAL DAILY
Qty: 90 TABLET | Refills: 1 | Status: SHIPPED | OUTPATIENT
Start: 2021-11-02 | End: 2022-08-08

## 2021-11-02 SDOH — ECONOMIC STABILITY: FOOD INSECURITY: WITHIN THE PAST 12 MONTHS, THE FOOD YOU BOUGHT JUST DIDN'T LAST AND YOU DIDN'T HAVE MONEY TO GET MORE.: PATIENT DECLINED

## 2021-11-02 SDOH — ECONOMIC STABILITY: FOOD INSECURITY: WITHIN THE PAST 12 MONTHS, YOU WORRIED THAT YOUR FOOD WOULD RUN OUT BEFORE YOU GOT MONEY TO BUY MORE.: PATIENT DECLINED

## 2021-11-02 ASSESSMENT — ENCOUNTER SYMPTOMS
CHEST TIGHTNESS: 0
COUGH: 0
WHEEZING: 0
SHORTNESS OF BREATH: 0

## 2021-11-02 ASSESSMENT — SOCIAL DETERMINANTS OF HEALTH (SDOH): HOW HARD IS IT FOR YOU TO PAY FOR THE VERY BASICS LIKE FOOD, HOUSING, MEDICAL CARE, AND HEATING?: PATIENT DECLINED

## 2021-11-02 NOTE — PROGRESS NOTES
2021    TELEHEALTH EVALUATION -- Audio/Visual (During INVPJ-89 public health emergency)    HPI: Seen today via video visit while he was at home and I was at work. Bob Saba (:  1975) has requested an audio/video evaluation for the following concern(s):    Depression: improving; he has been feeling better. When he first took the medication he felt better and less irritable. He is much more relaxed and less tense. He is much less grouchy. His wife feels like he is better. His sleep is better. He is more rested when he wakes up. No issues with panic attacks. No issues with side effects. His appetite has been good. Review of Systems   Constitutional: Negative for activity change, appetite change, chills, fatigue and fever. Respiratory: Negative for cough, chest tightness, shortness of breath and wheezing. Cardiovascular: Negative for chest pain, palpitations and leg swelling. Genitourinary: Negative for difficulty urinating. Skin: Negative for rash. Neurological: Negative for dizziness, syncope, weakness, light-headedness and headaches. Psychiatric/Behavioral: Negative for decreased concentration, dysphoric mood and sleep disturbance. The patient is not nervous/anxious. Prior to Visit Medications    Medication Sig Taking?  Authorizing Provider   citalopram (CELEXA) 10 MG tablet Take 1 tablet by mouth daily Yes Azael Gonzales MD   Triamcinolone Acetonide (NASACORT ALLERGY 24HR NA)  Yes Historical Provider, MD   Fexofenadine HCl (ALLEGRA ALLERGY PO) Take 1 tablet by mouth Yes Historical Provider, MD       Social History     Tobacco Use    Smoking status: Never Smoker    Smokeless tobacco: Never Used   Vaping Use    Vaping Use: Never used   Substance Use Topics    Alcohol use: No    Drug use: No        No Known Allergies,   Past Medical History:   Diagnosis Date    Allergic rhinitis     spring allergies       PHYSICAL EXAMINATION:  [ INSTRUCTIONS:  \"[x]\" Indicates a positive item  \"[]\" Indicates a negative item  -- DELETE ALL ITEMS NOT EXAMINED]  Vital Signs: (As obtained by patient/caregiver or practitioner observation)    Patient-Reported Vitals 11/2/2021   Patient-Reported Weight 238 lbs   Patient-Reported Height 6'          Constitutional: [x] Appears well-developed and well-nourished [x] No apparent distress      [] Abnormal-   Mental status  [x] Alert and awake  [x] Oriented to person/place/time [x]Able to follow commands      Eyes:  EOM    [x]  Normal  [] Abnormal-  Sclera  [x]  Normal  [] Abnormal -         Discharge [x]  None visible  [] Abnormal -    HENT:   [x] Normocephalic, atraumatic. [] Abnormal   [x] Mouth/Throat: Mucous membranes are moist.     External Ears [x] Normal  [] Abnormal-     Neck: [x] No visualized mass     Pulmonary/Chest: [x] Respiratory effort normal.  [x] No visualized signs of difficulty breathing or respiratory distress        [] Abnormal-               Psychiatric:       [x] Normal Affect [x] No Hallucinations        [] Abnormal-     Other pertinent observable physical exam findings-     ASSESSMENT/PLAN:  1. Current moderate episode of major depressive disorder without prior episode ([address]  Improving; he is doing really well overall and he is happy with the dose he is on so I will continue it.     - citalopram (CELEXA) 10 MG tablet; Take 1 tablet by mouth daily  Dispense: 90 tablet; Refill: 1      Return in about 3 months (around 2/2/2022) for Depression follow up. Susy Sanon, was evaluated through a synchronous (real-time) audio-video encounter. The patient (or guardian if applicable) is aware that this is a billable service. Verbal consent to proceed has been obtained within the past 12 months. The visit was conducted pursuant to the emergency declaration under the [facility], [address] [place] [address] authority and the [employer] Act.   Patient identification was verified, and a caregiver was present when appropriate. The patient was located in a state where the provider was credentialed to provide care. Total time spent on this encounter: Not billed by time    --Gail Kolb MD on 11/2/2021 at 2:21 PM    An electronic signature was used to authenticate this note.

## 2021-12-29 ENCOUNTER — IMMUNIZATION (OUTPATIENT)
Dept: LAB | Age: 46
End: 2021-12-29
Payer: COMMERCIAL

## 2021-12-29 PROCEDURE — 91301 COVID-19, MODERNA PRIMARY SERIES VACCINE 100MCG/0.5ML DOSE: CPT | Performed by: INTERNAL MEDICINE

## 2021-12-29 PROCEDURE — 0011A COVID-19, MODERNA PRIMARY SERIES VACCINE 100MCG/0.5ML DOSE: CPT | Performed by: INTERNAL MEDICINE

## 2022-01-04 ENCOUNTER — TELEPHONE (OUTPATIENT)
Dept: FAMILY MEDICINE CLINIC | Age: 47
End: 2022-01-04

## 2022-01-04 NOTE — TELEPHONE ENCOUNTER
Attempted to reach patient regarding missed appointment on 1/4/22. Unable to contact at this time. Left message to reschedule.

## 2022-01-26 ENCOUNTER — IMMUNIZATION (OUTPATIENT)
Dept: LAB | Age: 47
End: 2022-01-26
Payer: COMMERCIAL

## 2022-01-26 PROCEDURE — 0012A COVID-19, MODERNA PRIMARY SERIES VACCINE 100MCG/0.5ML DOSE: CPT | Performed by: INTERNAL MEDICINE

## 2022-01-26 PROCEDURE — 91301 COVID-19, MODERNA PRIMARY SERIES VACCINE 100MCG/0.5ML DOSE: CPT | Performed by: INTERNAL MEDICINE

## 2022-06-14 ENCOUNTER — E-VISIT (OUTPATIENT)
Dept: FAMILY MEDICINE CLINIC | Age: 47
End: 2022-06-14
Payer: COMMERCIAL

## 2022-06-14 DIAGNOSIS — L23.7 POISON IVY: Primary | ICD-10-CM

## 2022-06-14 PROCEDURE — 99421 OL DIG E/M SVC 5-10 MIN: CPT | Performed by: FAMILY MEDICINE

## 2022-06-14 RX ORDER — PREDNISONE 20 MG/1
TABLET ORAL
Qty: 15 TABLET | Refills: 0 | Status: SHIPPED | OUTPATIENT
Start: 2022-06-14

## 2022-06-14 NOTE — PROGRESS NOTES
Patient questionnaire reviewed, seems he most likely has poison ivy. I sent in a prednisone taper. 5-10 minutes were spent on the digital evaluation and management of this patient.

## 2022-08-07 DIAGNOSIS — F32.1 CURRENT MODERATE EPISODE OF MAJOR DEPRESSIVE DISORDER WITHOUT PRIOR EPISODE (HCC): ICD-10-CM

## 2022-08-08 RX ORDER — CITALOPRAM 10 MG/1
10 TABLET ORAL DAILY
Qty: 30 TABLET | Refills: 0 | Status: SHIPPED | OUTPATIENT
Start: 2022-08-08

## 2022-08-08 NOTE — TELEPHONE ENCOUNTER
Candelario Favre called requesting a refill of the below medication which has been pended for you:     Requested Prescriptions     Pending Prescriptions Disp Refills    citalopram (CELEXA) 10 MG tablet [Pharmacy Med Name: CITALOPRAM 10MG TABLETS] 30 tablet 0     Sig: TAKE 1 TABLET BY MOUTH DAILY       Last Appointment Date: 6/14/2022  Next Appointment Date: 8/19/2022    No Known Allergies

## 2023-01-19 ENCOUNTER — OFFICE VISIT (OUTPATIENT)
Dept: FAMILY MEDICINE CLINIC | Age: 48
End: 2023-01-19
Payer: COMMERCIAL

## 2023-01-19 VITALS
BODY MASS INDEX: 31.97 KG/M2 | WEIGHT: 236 LBS | TEMPERATURE: 98.3 F | HEART RATE: 76 BPM | HEIGHT: 72 IN | SYSTOLIC BLOOD PRESSURE: 132 MMHG | DIASTOLIC BLOOD PRESSURE: 82 MMHG | OXYGEN SATURATION: 98 %

## 2023-01-19 DIAGNOSIS — Z00.00 VISIT FOR WELL MAN HEALTH CHECK: ICD-10-CM

## 2023-01-19 DIAGNOSIS — M77.12 LATERAL EPICONDYLITIS OF LEFT ELBOW: Primary | ICD-10-CM

## 2023-01-19 DIAGNOSIS — Z13.1 SCREENING FOR DIABETES MELLITUS: ICD-10-CM

## 2023-01-19 PROCEDURE — 99213 OFFICE O/P EST LOW 20 MIN: CPT | Performed by: FAMILY MEDICINE

## 2023-01-19 RX ORDER — AMOXICILLIN AND CLAVULANATE POTASSIUM 875; 125 MG/1; MG/1
TABLET, FILM COATED ORAL
COMMUNITY
Start: 2023-01-14

## 2023-01-19 RX ORDER — OXYCODONE HYDROCHLORIDE AND ACETAMINOPHEN 5; 325 MG/1; MG/1
TABLET ORAL
COMMUNITY
Start: 2023-01-14

## 2023-01-19 ASSESSMENT — ENCOUNTER SYMPTOMS
SHORTNESS OF BREATH: 0
CHEST TIGHTNESS: 0
COUGH: 0
WHEEZING: 0

## 2023-01-19 NOTE — PROGRESS NOTES
KENN[facility]re[facility]  [address] [address]  Dept: [phone]  Dept Fax: [phone]  Loc: [phone]    Saran Goodwin is a 50 y.o. male who presents today for his medical conditions/complaints as noted below. Saran Goodwin is c/o of   Chief Complaint   Patient presents with    1 Year Follow Up           Blood Pressure Check     Increased due to appendectomy    Elbow Pain     Left arm       HPI:     Here today for left elbow pain and he is worried about his bp. Arm Pain   The incident occurred more than 1 week ago. There was no injury mechanism. The pain is present in the left elbow, left forearm and left fingers. The quality of the pain is described as burning. The pain radiates to the left hand and left arm. The pain is at a severity of 4/10. The pain is severe. The pain has been Intermittent since the incident. Associated symptoms include muscle weakness. Pertinent negatives include no chest pain, numbness or tingling. He has tried NSAIDs, rest and immobilization (chiropractor helped) for the symptoms. The treatment provided mild relief. Patient gets burning through elbow that occasionally goes through forearm into his middle finger. This started about two months ago around when the weather got cold. He does not remember any trauma but feels that the pain gets worse when he sleeps on it. Now that he has had surgery and needs to sleep on his back this has helped improve the pain. When it flares he has a hard time gripping things and feels as though he could drop things. During those times even picking up his phone or a cup of coffee is difficult. He can feel the pain mostly along his lateral elbow. When the pain gets really bad he will take ibuprofen and he thinks it relieves some of the pain.   A few weeks ago he had some pain and discomfort on his left back near his left shoulder blade and spine during a flare up. He went to the chiropractor and felt some relief for a while. No recent change in activity including cutting trees or home repair. No increased pain when playing guitar. When he was getting his surgery he was taking his blood pressure every day and it was high. It was about 150/94 and 155/109 yesterday. Up to this point he thought it was due to pain after surgery but is concerned that it is still present even after some healing. He had some shortness of breath about a month ago while walking some stairs at home. This has not happened since. He denies any dizziness, leg swelling, or chest pain. No headaches or blurred vision. Past Medical History:   Diagnosis Date    Allergic rhinitis     spring allergies          Social History     Tobacco Use    Smoking status: Never    Smokeless tobacco: Never   Substance Use Topics    Alcohol use: No     Current Outpatient Medications   Medication Sig Dispense Refill    amoxicillin-clavulanate (AUGMENTIN) 875-125 MG per tablet TAKE 1 TABLET BY MOUTH EVERY 12 HOURS FOR 5 DAYS      oxyCODONE-acetaminophen (PERCOCET) 5-325 MG per tablet TAKE 1 TABLET BY MOUTH EVERY 6 HOURS FOR UP TO 5 DAYS AS NEEDED      Triamcinolone Acetonide (NASACORT ALLERGY 24HR NA)       Fexofenadine HCl (ALLEGRA ALLERGY PO) Take 1 tablet by mouth       No current facility-administered medications for this visit. No Known Allergies    Subjective:     Review of Systems   Constitutional:  Negative for activity change, appetite change, chills, fatigue and fever. Eyes:  Negative for visual disturbance. Respiratory:  Negative for cough, chest tightness, shortness of breath and wheezing. Cardiovascular:  Negative for chest pain, palpitations and leg swelling. Genitourinary:  Negative for difficulty urinating. Musculoskeletal:  Positive for arthralgias (left elbow pain). Skin:  Negative for rash.    Neurological:  Negative for dizziness, tingling, syncope, weakness, light-headedness, numbness and headaches. Psychiatric/Behavioral:  Negative for sleep disturbance. Objective:      Physical Exam  Vitals and nursing note reviewed. Constitutional:       General: He is not in acute distress. Appearance: He is well-developed. Eyes:      Conjunctiva/sclera: Conjunctivae normal.   Cardiovascular:      Rate and Rhythm: Normal rate and regular rhythm. Heart sounds: Normal heart sounds. No murmur heard. Pulmonary:      Effort: Pulmonary effort is normal. No respiratory distress. Breath sounds: Normal breath sounds. No wheezing or rales. Musculoskeletal:         General: Normal range of motion. Left elbow: Tenderness present in lateral epicondyle. Cervical back: Normal range of motion and neck supple. Lymphadenopathy:      Cervical: No cervical adenopathy. Skin:     General: Skin is warm and dry. Findings: No rash. Neurological:      Mental Status: He is alert and oriented to person, place, and time. Psychiatric:         Mood and Affect: Mood normal.         Behavior: Behavior normal.     /82   Pulse 76   Temp 98.3 °F (36.8 °C)   Ht 6' (1.829 m)   Wt 236 lb (107 kg)   SpO2 98%   BMI 32.01 kg/m²     Assessment:       Diagnosis Orders   1. Lateral epicondylitis of left elbow        2. Visit for [facility] health check  Basic Metabolic Panel    Lipid Panel      3. Screening for diabetes mellitus  Hemoglobin A1C                Plan:       Lateral epicondylitis: new; I offered a steroid injection which he refused today. I offered to do steroids in a few weeks when he is further out post op from his appendectomy. I also gave him some home exercises to do and I recommended ice when needed. He was reassured that his bp has returned to normal    Return in about 3 months (around 4/19/2023) for Well man.     Orders Placed This Encounter   Procedures    Basic Metabolic Panel     Standing Status:   Future     Standing Expiration Date:   1/19/2024    Lipid Panel     Standing Status:   Future     Standing Expiration Date:   1/19/2024    Hemoglobin A1C     Standing Status:   Future     Standing Expiration Date:   1/19/2024           Patientgiven educational materials - see patient instructions. Discussed use, benefit,and side effects of prescribed medications. All patient questions answered. Ptvoiced understanding. Reviewed health maintenance. Instructed to continue currentmedications, diet and exercise. Patient agreed with treatment plan. Follow up asdirected.      Electronically signed by Marcia Joyce MD on 1/19/2023 at 5:45 PM

## 2023-01-25 ENCOUNTER — PATIENT MESSAGE (OUTPATIENT)
Dept: FAMILY MEDICINE CLINIC | Age: 48
End: 2023-01-25

## 2023-01-25 NOTE — TELEPHONE ENCOUNTER
From: Bob Saba  To: Dr. Wallis Kirby: 1/25/2023 12:22 PM EST  Subject: Weight loss question     Dr Rayo Reyes,  Hoping you are staying warm today! I have noticed since being home from my Appendix being removed the last two days I have lost 5 lbs? I plan to keep an eye on this but also unusual for me! If the weight loss continues, when should I be concerned and follow up? Just keeping eye on my health and probably making a bigger deal out of this than I should be.     Mayte Schulte

## 2023-02-02 ENCOUNTER — OFFICE VISIT (OUTPATIENT)
Dept: FAMILY MEDICINE CLINIC | Age: 48
End: 2023-02-02
Payer: COMMERCIAL

## 2023-02-02 VITALS
TEMPERATURE: 97.2 F | DIASTOLIC BLOOD PRESSURE: 80 MMHG | BODY MASS INDEX: 31.97 KG/M2 | WEIGHT: 236 LBS | SYSTOLIC BLOOD PRESSURE: 130 MMHG | HEART RATE: 72 BPM | HEIGHT: 72 IN | OXYGEN SATURATION: 96 %

## 2023-02-02 DIAGNOSIS — M54.42 ACUTE LEFT-SIDED LOW BACK PAIN WITH LEFT-SIDED SCIATICA: Primary | ICD-10-CM

## 2023-02-02 PROCEDURE — 99213 OFFICE O/P EST LOW 20 MIN: CPT | Performed by: FAMILY MEDICINE

## 2023-02-02 RX ORDER — KETOROLAC TROMETHAMINE 10 MG/1
10 TABLET, FILM COATED ORAL EVERY 6 HOURS PRN
COMMUNITY
Start: 2023-01-31 | End: 2023-02-05

## 2023-02-02 RX ORDER — CEPHALEXIN 500 MG/1
500 CAPSULE ORAL 4 TIMES DAILY
COMMUNITY
Start: 2023-01-31 | End: 2023-02-02

## 2023-02-02 RX ORDER — PHENAZOPYRIDINE HYDROCHLORIDE 200 MG/1
200 TABLET, FILM COATED ORAL 3 TIMES DAILY PRN
COMMUNITY
Start: 2023-01-31 | End: 2023-02-02

## 2023-02-02 RX ORDER — CYCLOBENZAPRINE HCL 5 MG
5 TABLET ORAL NIGHTLY PRN
Qty: 30 TABLET | Refills: 0 | Status: SHIPPED | OUTPATIENT
Start: 2023-02-02 | End: 2023-02-12

## 2023-02-02 SDOH — ECONOMIC STABILITY: INCOME INSECURITY: HOW HARD IS IT FOR YOU TO PAY FOR THE VERY BASICS LIKE FOOD, HOUSING, MEDICAL CARE, AND HEATING?: PATIENT DECLINED

## 2023-02-02 SDOH — ECONOMIC STABILITY: FOOD INSECURITY: WITHIN THE PAST 12 MONTHS, THE FOOD YOU BOUGHT JUST DIDN'T LAST AND YOU DIDN'T HAVE MONEY TO GET MORE.: PATIENT DECLINED

## 2023-02-02 SDOH — ECONOMIC STABILITY: HOUSING INSECURITY
IN THE LAST 12 MONTHS, WAS THERE A TIME WHEN YOU DID NOT HAVE A STEADY PLACE TO SLEEP OR SLEPT IN A SHELTER (INCLUDING NOW)?: PATIENT REFUSED

## 2023-02-02 SDOH — ECONOMIC STABILITY: FOOD INSECURITY: WITHIN THE PAST 12 MONTHS, YOU WORRIED THAT YOUR FOOD WOULD RUN OUT BEFORE YOU GOT MONEY TO BUY MORE.: PATIENT DECLINED

## 2023-02-02 SDOH — ECONOMIC STABILITY: TRANSPORTATION INSECURITY
IN THE PAST 12 MONTHS, HAS LACK OF TRANSPORTATION KEPT YOU FROM MEETINGS, WORK, OR FROM GETTING THINGS NEEDED FOR DAILY LIVING?: PATIENT DECLINED

## 2023-02-02 ASSESSMENT — ENCOUNTER SYMPTOMS
SHORTNESS OF BREATH: 0
CHEST TIGHTNESS: 0
ABDOMINAL PAIN: 1
COUGH: 0
WHEEZING: 0
BLOOD IN STOOL: 0
BACK PAIN: 1
BOWEL INCONTINENCE: 0

## 2023-02-02 ASSESSMENT — PATIENT HEALTH QUESTIONNAIRE - PHQ9
SUM OF ALL RESPONSES TO PHQ QUESTIONS 1-9: 0
5. POOR APPETITE OR OVEREATING: 0
SUM OF ALL RESPONSES TO PHQ QUESTIONS 1-9: 0
8. MOVING OR SPEAKING SO SLOWLY THAT OTHER PEOPLE COULD HAVE NOTICED. OR THE OPPOSITE, BEING SO FIGETY OR RESTLESS THAT YOU HAVE BEEN MOVING AROUND A LOT MORE THAN USUAL: 0
4. FEELING TIRED OR HAVING LITTLE ENERGY: 0
9. THOUGHTS THAT YOU WOULD BE BETTER OFF DEAD, OR OF HURTING YOURSELF: 0
10. IF YOU CHECKED OFF ANY PROBLEMS, HOW DIFFICULT HAVE THESE PROBLEMS MADE IT FOR YOU TO DO YOUR WORK, TAKE CARE OF THINGS AT HOME, OR GET ALONG WITH OTHER PEOPLE: 0
SUM OF ALL RESPONSES TO PHQ QUESTIONS 1-9: 0
7. TROUBLE CONCENTRATING ON THINGS, SUCH AS READING THE NEWSPAPER OR WATCHING TELEVISION: 0
SUM OF ALL RESPONSES TO PHQ9 QUESTIONS 1 & 2: 0
1. LITTLE INTEREST OR PLEASURE IN DOING THINGS: 0
SUM OF ALL RESPONSES TO PHQ QUESTIONS 1-9: 0
6. FEELING BAD ABOUT YOURSELF - OR THAT YOU ARE A FAILURE OR HAVE LET YOURSELF OR YOUR FAMILY DOWN: 0
3. TROUBLE FALLING OR STAYING ASLEEP: 0
2. FEELING DOWN, DEPRESSED OR HOPELESS: 0

## 2023-02-02 NOTE — PROGRESS NOTES
[facility] [address]  [address] [address]  Dept: [phone]  Dept Fax: [phone]  Loc: [phone]    Christie Linares is a 50 y.o. male who presents today for his medical conditions/complaints as noted below. Christie Linares is c/o of   Chief Complaint   Patient presents with    Flank Pain     Left sided; Gabby Meza  1/31/2023; seen for dysuria       HPI:     Here today for flank pain    Flank Pain  This is a new problem. The current episode started in the past 7 days (4 days). The problem occurs constantly. The pain is present in the thoracic spine (on the left side). The quality of the pain is described as aching. The pain radiates to the left knee (LLQ). The pain is at a severity of 6/10. The pain is moderate. Associated symptoms include abdominal pain and leg pain. Pertinent negatives include no bladder incontinence, bowel incontinence, chest pain, dysuria, fever, numbness, paresis, paresthesias, tingling or weakness. Treatments tried: antibiotics for possible UTI. The treatment provided mild relief. Past Medical History:   Diagnosis Date    Allergic rhinitis     spring allergies          Social History     Tobacco Use    Smoking status: Never    Smokeless tobacco: Never   Substance Use Topics    Alcohol use: No     Current Outpatient Medications   Medication Sig Dispense Refill    ketorolac (TORADOL) 10 MG tablet Take 10 mg by mouth every 6 hours as needed      cyclobenzaprine (FLEXERIL) 5 MG tablet Take 1 tablet by mouth nightly as needed for Muscle spasms 30 tablet 0    Triamcinolone Acetonide (NASACORT ALLERGY 24HR NA)  (Patient not taking: Reported on 2/2/2023)      Fexofenadine HCl (ALLEGRA ALLERGY PO) Take 1 tablet by mouth (Patient not taking: Reported on 2/2/2023)       No current facility-administered medications for this visit.           Allergies   Allergen Reactions    Ibuprofen Other (See Comments)     Bloody stools       Subjective:     Review of Systems   Constitutional:  Positive for fatigue. Negative for activity change, appetite change, chills and fever. Eyes:  Negative for visual disturbance. Respiratory:  Negative for cough, chest tightness, shortness of breath and wheezing. Cardiovascular:  Negative for chest pain, palpitations and leg swelling. Gastrointestinal:  Positive for abdominal pain. Negative for blood in stool and bowel incontinence. Bowel are loose   Genitourinary:  Positive for flank pain. Negative for bladder incontinence, difficulty urinating, dysuria and hematuria. Musculoskeletal:  Positive for back pain and gait problem. Skin:  Negative for rash. Neurological:  Negative for dizziness, tingling, syncope, weakness, light-headedness, numbness and paresthesias. Objective:      Physical Exam  Vitals and nursing note reviewed. Constitutional:       General: He is not in acute distress. Appearance: He is well-developed. Eyes:      Conjunctiva/sclera: Conjunctivae normal.   Cardiovascular:      Rate and Rhythm: Normal rate and regular rhythm. Heart sounds: Normal heart sounds. No murmur heard. Pulmonary:      Effort: Pulmonary effort is normal. No respiratory distress. Breath sounds: Normal breath sounds. No wheezing or rales. Musculoskeletal:         General: Normal range of motion. Cervical back: Normal range of motion and neck supple. Lymphadenopathy:      Cervical: No cervical adenopathy. Skin:     General: Skin is warm and dry. Findings: No rash. Neurological:      Mental Status: He is alert and oriented to person, place, and time. Psychiatric:         Mood and Affect: Mood normal.         Behavior: Behavior normal.         Thought Content:  Thought content normal.         Judgment: Judgment normal.     /80   Pulse 72   Temp 97.2 °F (36.2 °C)   Ht 6' (1.829 m)   Wt 236 lb (107 kg)   SpO2 96%   BMI 32.01 kg/m²     Assessment:       Diagnosis Orders   1. Acute left-sided low back pain with left-sided sciatica  cyclobenzaprine (FLEXERIL) 5 MG tablet                Plan:       Back pain: new; I recommended NSAIDs, heat, ice and back exercises. he was given back exercises to take home. I also recommended an occasional muscle relaxer at bedtime if he needs it. I also talked about the importance of good posture and staying active. He was informed his urine culture was negative for infection today so I told him to stop his antibiotics. I also told him he can stop the pyridium. Return if symptoms worsen or fail to improve. Orders Placed This Encounter   Medications    cyclobenzaprine (FLEXERIL) 5 MG tablet     Sig: Take 1 tablet by mouth nightly as needed for Muscle spasms     Dispense:  30 tablet     Refill:  0       Patientgiven educational materials - see patient instructions. Discussed use, benefit,and side effects of prescribed medications. All patient questions answered. Ptvoiced understanding. Reviewed health maintenance. Instructed to continue currentmedications, diet and exercise. Patient agreed with treatment plan. Follow up asdirected.      Electronically signed by Mireille Correa MD on 2/2/2023 at 1:59 PM

## 2023-03-28 ENCOUNTER — HOSPITAL ENCOUNTER (OUTPATIENT)
Age: 48
Discharge: HOME | End: 2023-03-28
Payer: COMMERCIAL

## 2023-03-28 DIAGNOSIS — Z13.1 SCREENING FOR DIABETES MELLITUS: ICD-10-CM

## 2023-03-28 DIAGNOSIS — Z00.00 VISIT FOR WELL MAN HEALTH CHECK: ICD-10-CM

## 2023-03-28 LAB
ANION GAP SERPL CALCULATED.3IONS-SCNC: 9 MMOL/L (ref 9–17)
BUN SERPL-MCNC: 10 MG/DL (ref 6–20)
BUN/CREAT BLD: 10 (ref 9–20)
CALCIUM SERPL-MCNC: 9.6 MG/DL (ref 8.6–10.4)
CHLORIDE SERPL-SCNC: 103 MMOL/L (ref 98–107)
CHOLEST SERPL-MCNC: 247 MG/DL
CHOLESTEROL/HDL RATIO: 9.9
CO2 SERPL-SCNC: 29 MMOL/L (ref 20–31)
CREAT SERPL-MCNC: 0.98 MG/DL (ref 0.7–1.2)
EST. AVERAGE GLUCOSE BLD GHB EST-MCNC: 94 MG/DL
GFR SERPL CREATININE-BSD FRML MDRD: >60 ML/MIN/1.73M2
GLUCOSE SERPL-MCNC: 126 MG/DL (ref 70–99)
HBA1C MFR BLD: 4.9 % (ref 4–6)
HDLC SERPL-MCNC: 25 MG/DL
LDLC SERPL CALC-MCNC: ABNORMAL MG/DL (ref 0–130)
LDLC SERPL DIRECT ASSAY-MCNC: 137 MG/DL
POTASSIUM SERPL-SCNC: 4.2 MMOL/L (ref 3.7–5.3)
SODIUM SERPL-SCNC: 141 MMOL/L (ref 135–144)
TRIGL SERPL-MCNC: 519 MG/DL

## 2023-03-28 PROCEDURE — 80048 BASIC METABOLIC PNL TOTAL CA: CPT

## 2023-03-28 PROCEDURE — 80061 LIPID PANEL: CPT

## 2023-03-28 PROCEDURE — 83036 HEMOGLOBIN GLYCOSYLATED A1C: CPT

## 2023-03-28 PROCEDURE — 83721 ASSAY OF BLOOD LIPOPROTEIN: CPT

## 2023-03-28 PROCEDURE — 36415 COLL VENOUS BLD VENIPUNCTURE: CPT

## 2023-03-30 ENCOUNTER — OFFICE VISIT (OUTPATIENT)
Dept: FAMILY MEDICINE CLINIC | Age: 48
End: 2023-03-30
Payer: COMMERCIAL

## 2023-03-30 VITALS
HEIGHT: 72 IN | BODY MASS INDEX: 32.23 KG/M2 | TEMPERATURE: 97.1 F | HEART RATE: 64 BPM | SYSTOLIC BLOOD PRESSURE: 130 MMHG | DIASTOLIC BLOOD PRESSURE: 80 MMHG | OXYGEN SATURATION: 96 % | WEIGHT: 238 LBS

## 2023-03-30 DIAGNOSIS — F32.1 CURRENT MODERATE EPISODE OF MAJOR DEPRESSIVE DISORDER WITHOUT PRIOR EPISODE (HCC): ICD-10-CM

## 2023-03-30 DIAGNOSIS — E78.2 MIXED HYPERLIPIDEMIA: ICD-10-CM

## 2023-03-30 DIAGNOSIS — Z00.00 VISIT FOR WELL MAN HEALTH CHECK: Primary | ICD-10-CM

## 2023-03-30 DIAGNOSIS — M77.12 LATERAL EPICONDYLITIS OF LEFT ELBOW: ICD-10-CM

## 2023-03-30 DIAGNOSIS — K51.211 ULCERATIVE PROCTITIS WITH RECTAL BLEEDING (HCC): ICD-10-CM

## 2023-03-30 PROCEDURE — 99396 PREV VISIT EST AGE 40-64: CPT | Performed by: FAMILY MEDICINE

## 2023-03-30 RX ORDER — ATORVASTATIN CALCIUM 40 MG/1
40 TABLET, FILM COATED ORAL DAILY
Qty: 90 TABLET | Refills: 3 | Status: SHIPPED | OUTPATIENT
Start: 2023-03-30

## 2023-03-30 ASSESSMENT — ENCOUNTER SYMPTOMS
COUGH: 0
CHEST TIGHTNESS: 0
SHORTNESS OF BREATH: 0
WHEEZING: 0

## 2023-03-30 NOTE — PROGRESS NOTES
facility-administered medications for this visit. Allergies   Allergen Reactions    Ibuprofen Other (See Comments)     Bloody stools       Subjective:     Review of Systems   Constitutional:  Negative for activity change, appetite change, chills, fatigue and fever. Eyes:  Negative for visual disturbance. Respiratory:  Negative for cough, chest tightness, shortness of breath and wheezing. Cardiovascular:  Negative for chest pain, palpitations and leg swelling. Genitourinary:  Negative for difficulty urinating. Skin:  Negative for rash. Neurological:  Negative for dizziness, syncope, weakness, light-headedness and headaches. Objective:      Physical Exam  Vitals and nursing note reviewed. Constitutional:       General: He is not in acute distress. Appearance: He is well-developed. Eyes:      Conjunctiva/sclera: Conjunctivae normal.   Cardiovascular:      Rate and Rhythm: Normal rate and regular rhythm. Heart sounds: Normal heart sounds. No murmur heard. Pulmonary:      Effort: Pulmonary effort is normal. No respiratory distress. Breath sounds: Normal breath sounds. No wheezing or rales. Musculoskeletal:         General: Normal range of motion. Cervical back: Normal range of motion and neck supple. Lymphadenopathy:      Cervical: No cervical adenopathy. Skin:     General: Skin is warm and dry. Findings: No rash. Neurological:      Mental Status: He is alert and oriented to person, place, and time. /80   Pulse 64   Temp 97.1 °F (36.2 °C)   Ht 6' (1.829 m)   Wt 238 lb (108 kg)   SpO2 96%   BMI 32.28 kg/m²     Assessment:       Diagnosis Orders   1. Visit for well man health check        2. Mixed hyperlipidemia        3. Lateral epicondylitis of left elbow        4. Current moderate episode of major depressive disorder without prior episode ([address]        5.  Ulcerative proctitis with rectal bleeding (HCC)                  Plan:       Well man: he

## 2023-10-02 ENCOUNTER — OFFICE VISIT (OUTPATIENT)
Dept: FAMILY MEDICINE CLINIC | Age: 48
End: 2023-10-02
Payer: COMMERCIAL

## 2023-10-02 VITALS
SYSTOLIC BLOOD PRESSURE: 130 MMHG | HEIGHT: 72 IN | HEART RATE: 63 BPM | WEIGHT: 245.6 LBS | OXYGEN SATURATION: 98 % | BODY MASS INDEX: 33.26 KG/M2 | DIASTOLIC BLOOD PRESSURE: 78 MMHG | TEMPERATURE: 98.7 F

## 2023-10-02 DIAGNOSIS — H69.91 DYSFUNCTION OF RIGHT EUSTACHIAN TUBE: ICD-10-CM

## 2023-10-02 DIAGNOSIS — K51.211 ULCERATIVE PROCTITIS WITH RECTAL BLEEDING (HCC): Primary | ICD-10-CM

## 2023-10-02 PROCEDURE — 99213 OFFICE O/P EST LOW 20 MIN: CPT | Performed by: FAMILY MEDICINE

## 2023-10-02 ASSESSMENT — ENCOUNTER SYMPTOMS
RHINORRHEA: 0
ABDOMINAL PAIN: 0
SHORTNESS OF BREATH: 0
DIARRHEA: 1
SORE THROAT: 0
COUGH: 0
SINUS PRESSURE: 0

## 2023-10-02 NOTE — PROGRESS NOTES
[address]  [address] [facility] [id number]  Dept: [phone]  Dept Fax: [phone]  Loc: [phone]    Nel Partida is a 50 y.o. male who presents today for his medical conditions/complaints as noted below. Nel Partida is c/o of   Chief Complaint   Patient presents with    Ulcerative Colitis     Flare up     Ear Fullness     Right ear fullness and pops when blowing nose since June          HPI:     Here today for ear fullness and abdominal pain. Ear Fullness   There is pain in the right ear. This is a new problem. The current episode started more than 1 month ago (3 months). The problem occurs every few hours. The problem has been unchanged. There has been no fever. The pain is at a severity of 1/10. The pain is mild. Associated symptoms include diarrhea, ear discharge, headaches (occasionally) and hearing loss. Pertinent negatives include no abdominal pain, coughing, rash, rhinorrhea or sore throat. Treatments tried: nasonex and benadryl occaisonally. The treatment provided no relief. He has been having pain in his LLQ. He sometimes gets a severe pain and he can hear it making noise and then he has a lot of problems with pain and diarrhea. He had some blood in the stool but that has resolved. He was nauseated but that has improved as well. He felt feverish 4 days ago when he was feeling really awful. He has had the symptoms for about 7 days. He increased his water while he was having the problems and it helped.      Past Medical History:   Diagnosis Date    Allergic rhinitis     spring allergies          Social History     Tobacco Use    Smoking status: Never    Smokeless tobacco: Never   Substance Use Topics    Alcohol use: No     Current Outpatient Medications   Medication Sig Dispense Refill    atorvastatin (LIPITOR) 40 MG tablet Take 1 tablet by mouth daily (Patient taking differently: Take 1

## 2024-01-10 ENCOUNTER — PATIENT MESSAGE (OUTPATIENT)
Dept: FAMILY MEDICINE CLINIC | Age: 49
End: 2024-01-10

## 2024-01-10 DIAGNOSIS — K51.211 ULCERATIVE PROCTITIS WITH RECTAL BLEEDING (HCC): Primary | ICD-10-CM

## 2024-01-10 NOTE — TELEPHONE ENCOUNTER
From: Noe Moctezuma  Sent: 1/10/2024 11:47 AM EST  To: [facility] Clinical Staff  Subject: Health care question     Yes it does!  
Stable

## 2024-01-15 ASSESSMENT — PATIENT HEALTH QUESTIONNAIRE - PHQ9
3. TROUBLE FALLING OR STAYING ASLEEP: 0
SUM OF ALL RESPONSES TO PHQ QUESTIONS 1-9: 0
SUM OF ALL RESPONSES TO PHQ QUESTIONS 1-9: 0
7. TROUBLE CONCENTRATING ON THINGS, SUCH AS READING THE NEWSPAPER OR WATCHING TELEVISION: NOT AT ALL
SUM OF ALL RESPONSES TO PHQ QUESTIONS 1-9: 0
2. FEELING DOWN, DEPRESSED OR HOPELESS: NOT AT ALL
9. THOUGHTS THAT YOU WOULD BE BETTER OFF DEAD, OR OF HURTING YOURSELF: NOT AT ALL
9. THOUGHTS THAT YOU WOULD BE BETTER OFF DEAD, OR OF HURTING YOURSELF: 0
10. IF YOU CHECKED OFF ANY PROBLEMS, HOW DIFFICULT HAVE THESE PROBLEMS MADE IT FOR YOU TO DO YOUR WORK, TAKE CARE OF THINGS AT HOME, OR GET ALONG WITH OTHER PEOPLE: NOT DIFFICULT AT ALL
8. MOVING OR SPEAKING SO SLOWLY THAT OTHER PEOPLE COULD HAVE NOTICED. OR THE OPPOSITE, BEING SO FIGETY OR RESTLESS THAT YOU HAVE BEEN MOVING AROUND A LOT MORE THAN USUAL: 0
SUM OF ALL RESPONSES TO PHQ QUESTIONS 1-9: 0
5. POOR APPETITE OR OVEREATING: 0
4. FEELING TIRED OR HAVING LITTLE ENERGY: NOT AT ALL
SUM OF ALL RESPONSES TO PHQ9 QUESTIONS 1 & 2: 0
1. LITTLE INTEREST OR PLEASURE IN DOING THINGS: 0
3. TROUBLE FALLING OR STAYING ASLEEP: NOT AT ALL
6. FEELING BAD ABOUT YOURSELF - OR THAT YOU ARE A FAILURE OR HAVE LET YOURSELF OR YOUR FAMILY DOWN: NOT AT ALL
2. FEELING DOWN, DEPRESSED OR HOPELESS: 0
SUM OF ALL RESPONSES TO PHQ QUESTIONS 1-9: 0
1. LITTLE INTEREST OR PLEASURE IN DOING THINGS: NOT AT ALL
10. IF YOU CHECKED OFF ANY PROBLEMS, HOW DIFFICULT HAVE THESE PROBLEMS MADE IT FOR YOU TO DO YOUR WORK, TAKE CARE OF THINGS AT HOME, OR GET ALONG WITH OTHER PEOPLE: 0
6. FEELING BAD ABOUT YOURSELF - OR THAT YOU ARE A FAILURE OR HAVE LET YOURSELF OR YOUR FAMILY DOWN: 0
7. TROUBLE CONCENTRATING ON THINGS, SUCH AS READING THE NEWSPAPER OR WATCHING TELEVISION: 0
8. MOVING OR SPEAKING SO SLOWLY THAT OTHER PEOPLE COULD HAVE NOTICED. OR THE OPPOSITE - BEING SO FIDGETY OR RESTLESS THAT YOU HAVE BEEN MOVING AROUND A LOT MORE THAN USUAL: NOT AT ALL
5. POOR APPETITE OR OVEREATING: NOT AT ALL
4. FEELING TIRED OR HAVING LITTLE ENERGY: 0

## 2024-01-18 ENCOUNTER — OFFICE VISIT (OUTPATIENT)
Dept: FAMILY MEDICINE CLINIC | Age: 49
End: 2024-01-18
Payer: COMMERCIAL

## 2024-01-18 VITALS
WEIGHT: 239 LBS | DIASTOLIC BLOOD PRESSURE: 88 MMHG | HEART RATE: 68 BPM | SYSTOLIC BLOOD PRESSURE: 136 MMHG | OXYGEN SATURATION: 98 % | BODY MASS INDEX: 32.37 KG/M2 | HEIGHT: 72 IN

## 2024-01-18 DIAGNOSIS — K51.211 ULCERATIVE PROCTITIS WITH RECTAL BLEEDING (HCC): Primary | ICD-10-CM

## 2024-01-18 PROCEDURE — 99214 OFFICE O/P EST MOD 30 MIN: CPT | Performed by: FAMILY MEDICINE

## 2024-01-18 RX ORDER — AMOXICILLIN 875 MG/1
TABLET, COATED ORAL
COMMUNITY
Start: 2023-11-30

## 2024-01-18 RX ORDER — MESALAMINE 1000 MG/1
1000 SUPPOSITORY RECTAL NIGHTLY
Qty: 60 SUPPOSITORY | Refills: 1 | Status: SHIPPED | OUTPATIENT
Start: 2024-01-18

## 2024-01-18 ASSESSMENT — ENCOUNTER SYMPTOMS
CHEST TIGHTNESS: 0
SHORTNESS OF BREATH: 0
ABDOMINAL PAIN: 1
WHEEZING: 0
BLOOD IN STOOL: 0
DIARRHEA: 1
RECTAL PAIN: 1
NAUSEA: 0
VOMITING: 0
ABDOMINAL DISTENTION: 0
COUGH: 0

## 2024-01-18 NOTE — PROGRESS NOTES
(NASACORT ALLERGY 24HR NA)  (Patient not taking: Reported on 10/2/2023)      Fexofenadine HCl (ALLEGRA ALLERGY PO) Take 1 tablet by mouth (Patient not taking: Reported on 10/2/2023)       No current facility-administered medications for this visit.          Allergies   Allergen Reactions    Ibuprofen Other (See Comments)     Bloody stools       Subjective:     Review of Systems   Constitutional:  Negative for activity change, appetite change, chills, fatigue and fever.   Respiratory:  Negative for cough, chest tightness, shortness of breath and wheezing.    Cardiovascular:  Negative for chest pain, palpitations and leg swelling.   Gastrointestinal:  Positive for abdominal pain, diarrhea and rectal pain. Negative for abdominal distention, blood in stool, nausea and vomiting.   Skin:  Negative for rash.   Neurological:  Negative for dizziness, syncope, weakness and light-headedness.       Objective:      Physical Exam  Vitals and nursing note reviewed.   Constitutional:       General: He is not in acute distress.     Appearance: He is well-developed.   Eyes:      Conjunctiva/sclera: Conjunctivae normal.   Cardiovascular:      Rate and Rhythm: Normal rate and regular rhythm.      Heart sounds: Normal heart sounds. No murmur heard.  Pulmonary:      Effort: Pulmonary effort is normal. No respiratory distress.      Breath sounds: Normal breath sounds. No wheezing or rales.   Abdominal:      General: Abdomen is flat. Bowel sounds are normal. There is no distension.      Palpations: There is no mass.      Tenderness: There is no abdominal tenderness. There is no guarding or rebound.      Hernia: No hernia is present.   Musculoskeletal:         General: Normal range of motion.      Cervical back: Normal range of motion and neck supple.   Lymphadenopathy:      Cervical: No cervical adenopathy.   Skin:     General: Skin is warm and dry.      Findings: No rash.   Neurological:      Mental Status: He is alert and oriented to

## 2024-02-01 ENCOUNTER — PATIENT MESSAGE (OUTPATIENT)
Dept: FAMILY MEDICINE CLINIC | Age: 49
End: 2024-02-01

## 2024-02-01 NOTE — TELEPHONE ENCOUNTER
From: Noe Moctezuma  To: Dr. Iliana Mejia  Sent: 2/1/2024 3:34 PM EST  Subject: 10 day follow up      Good afternoon Dr. Mejia, sending you my follow up with the prescription that you prescribed. It has been working really well and I greatly appreciate it, I feel like it’s really helped with the inflammation and calm the storm for my gut Health.   On a sidenote I’ve been dealing with dizziness since last night, This is not consistent just off and on.  Background for the last 10 days I have been doing a 16 hour fast, watching my calorie count and only eating healthy foods I have exercised every night for 30 minutes getting my heart rate up to a consistently to 115 to 125.   Just curious because I’m trying to eat healthy and exercise if the feeling I have currently is stemming from that. Trying to drink at least 80 to 100 fluid ounces a day of water, Thanks for your time. Any feedback would be appreciative.

## 2024-02-14 ENCOUNTER — TELEPHONE (OUTPATIENT)
Dept: SURGERY | Age: 49
End: 2024-02-14

## 2024-02-14 ENCOUNTER — NURSE ONLY (OUTPATIENT)
Dept: LAB | Age: 49
End: 2024-02-14

## 2024-02-14 ENCOUNTER — OFFICE VISIT (OUTPATIENT)
Dept: SURGERY | Age: 49
End: 2024-02-14
Payer: COMMERCIAL

## 2024-02-14 VITALS
HEIGHT: 72 IN | DIASTOLIC BLOOD PRESSURE: 80 MMHG | HEART RATE: 67 BPM | TEMPERATURE: 97.9 F | SYSTOLIC BLOOD PRESSURE: 124 MMHG | WEIGHT: 231.4 LBS | BODY MASS INDEX: 31.34 KG/M2 | OXYGEN SATURATION: 97 %

## 2024-02-14 DIAGNOSIS — K92.1 HEMATOCHEZIA: ICD-10-CM

## 2024-02-14 DIAGNOSIS — K63.89 MASS OF COLON: Primary | ICD-10-CM

## 2024-02-14 PROBLEM — I10 HYPERTENSION: Status: ACTIVE | Noted: 2023-11-30

## 2024-02-14 PROCEDURE — 3079F DIAST BP 80-89 MM HG: CPT | Performed by: SURGERY

## 2024-02-14 PROCEDURE — 99204 OFFICE O/P NEW MOD 45 MIN: CPT | Performed by: SURGERY

## 2024-02-14 PROCEDURE — 3074F SYST BP LT 130 MM HG: CPT | Performed by: SURGERY

## 2024-02-14 RX ORDER — ZINC GLUCONATE 50 MG
50 TABLET ORAL PRN
COMMUNITY

## 2024-02-14 RX ORDER — ASCORBIC ACID 500 MG
500 TABLET ORAL PRN
COMMUNITY

## 2024-02-14 NOTE — TELEPHONE ENCOUNTER
LM on Dr Zhang  regarding pt coming in to our office tmw and needing notes.  Our office and fax number left on their VM

## 2024-02-14 NOTE — TELEPHONE ENCOUNTER
Robotic Surgery Scheduling Form   [facility] [address]  [place], [place] [address]    Phone * [phone]   Surgical Scheduling Direct line Phone * [phone]  Fax * [phone]      Noe Moctezuma      1975    male    [address] [place] [address]   Marital Status:         Home Phone: [phone]   Cell Phone:   Telephone Information:   Mobile [phone]              Surgeon: Dr. Covington  Surgery Date:02-   Time: 7:30am     Procedure:  1.  Bilateral ureteral stent placement by Dr. Ng         2.  Robotic assisted left sigmoid colon resection possible open Inpatient      Diagnosis: Colon cancer    Important Medical History: In Epic    Special Inst/Equip:     CPT Codes: 22656    Latex Allergy:   no Cardiac Device:  no    Anesthesia Type: General    Case Location:  Main OR     Preadmission Testing: Phone Call      PAT Date and Time: ________________________________    PAT Confirmation #: _________________________________    Post Op Visit:  ______________________________________    Need Preop Cardiac Clearance:   no    Does patient have Cardiologist/physician?   No    Surgery Conformation #:  ______________________________________________    : __________________________________ Date:____________________      RNFA (colon resection only):      Insurance Company Name:  [employer]

## 2024-02-14 NOTE — TELEPHONE ENCOUNTER
Patient scheduled for surgery with Dr. Covington on 02- at 7:30am with an arrival of 5:45am.  Preop surgery instructions and antibacterial soap given.  Surgery consent signed.  Bowel prep given.    Dr. Ng's office contacted regarding ureteral stents.

## 2024-02-14 NOTE — PROGRESS NOTES
colon.  This was tattooed    Past Medical History  Past Medical History:   Diagnosis Date    Allergic rhinitis     spring allergies    Colitis     Diverticulitis     Hyperlipidemia        Past Surgical History  Past Surgical History:   Procedure Laterality Date    APPENDECTOMY  01/13/2023    COLONOSCOPY  2011    COLONOSCOPY  06/08/2020    [id number]    HERNIA REPAIR  2007       Medications  Current Outpatient Medications on File Prior to Visit   Medication Sig Dispense Refill    vitamin C (ASCORBIC ACID) 500 MG tablet Take 1 tablet by mouth as needed (Cold symptoms)      zinc gluconate 50 MG tablet Take 1 tablet by mouth as needed (Cold symptoms)      Triamcinolone Acetonide (NASACORT ALLERGY 24HR NA)       Fexofenadine HCl (ALLEGRA ALLERGY PO) Take 1 tablet by mouth      mesalamine (CANASA) 1000 MG suppository Place 1 suppository rectally nightly (Patient not taking: Reported on 2/14/2024) 60 suppository 1    atorvastatin (LIPITOR) 40 MG tablet Take 1 tablet by mouth daily (Patient not taking: Reported on 1/18/2024) 90 tablet 3     No current facility-administered medications on file prior to visit.       Allergies  Patient has no known allergies.    Family History      Problem Relation Age of Onset    Lymphoma Mother     High Cholesterol Father     Colon Polyps Father     Lymphoma Brother     Cancer Paternal Grandmother         Uknown    Early Death Paternal Grandmother     Heart Attack Paternal Grandfather     Colon Cancer Neg Hx     Ulcerative Colitis Neg Hx     Crohn's Disease Neg Hx         Social History  Social History     Socioeconomic History    Marital status:      Spouse name: Not on file    Number of children: Not on file    Years of education: Not on file    Highest education level: Not on file   Occupational History    Not on file   Tobacco Use    Smoking status: Never    Smokeless tobacco: Never   Vaping Use    Vaping Use: Never used   Substance and Sexual Activity    Alcohol use: No

## 2024-02-15 ENCOUNTER — HOSPITAL ENCOUNTER (OUTPATIENT)
Age: 49
Discharge: HOME | End: 2024-02-15
Payer: COMMERCIAL

## 2024-02-15 ENCOUNTER — TELEPHONE (OUTPATIENT)
Dept: SURGERY | Age: 49
End: 2024-02-15

## 2024-02-15 DIAGNOSIS — K92.1 HEMATOCHEZIA: ICD-10-CM

## 2024-02-15 DIAGNOSIS — K63.89 MASS OF COLON: Primary | ICD-10-CM

## 2024-02-15 DIAGNOSIS — K63.89 MASS OF COLON: ICD-10-CM

## 2024-02-15 LAB — CEA SERPL-MCNC: 2.2 NG/ML

## 2024-02-15 PROCEDURE — 36415 COLL VENOUS BLD VENIPUNCTURE: CPT

## 2024-02-15 PROCEDURE — 82378 CARCINOEMBRYONIC ANTIGEN: CPT

## 2024-02-15 RX ORDER — METRONIDAZOLE 500 MG/1
500 TABLET ORAL 3 TIMES DAILY
Qty: 3 TABLET | Refills: 0 | Status: SHIPPED | OUTPATIENT
Start: 2024-02-15 | End: 2024-02-16

## 2024-02-15 NOTE — TELEPHONE ENCOUNTER
Could you please send in patient's Flagyl to [facility] in [place].  Thank you   Presented with MI 1995 and subsequent PCI in 1999.  Recent  perfusion studies suggesting LAD disease  Discussed with patient risk and benefits of invasive testing and will proceed at this time with Left Heart Cath with coronary angiography.

## 2024-02-16 ENCOUNTER — TELEPHONE (OUTPATIENT)
Dept: SURGERY | Age: 49
End: 2024-02-16

## 2024-02-16 DIAGNOSIS — C18.6 MALIGNANT NEOPLASM OF DESCENDING COLON (HCC): Primary | ICD-10-CM

## 2024-02-16 NOTE — PROGRESS NOTES
PAT VISIT  Appointment reminder call given  Date: 2/23  Arrival time: 10:30 and location; [facility]st [facility]   Bring Drivers license and insurance  Bring Medications in original bottles  Please be ready to give Urine Sample  If possible bring caregiver for appointment  Take am medications with water unless you are holding any for surgery  Appointment may last 2 hours

## 2024-02-21 ENCOUNTER — HOSPITAL ENCOUNTER (OUTPATIENT)
Dept: CT IMAGING | Age: 49
Discharge: HOME | End: 2024-02-23
Payer: COMMERCIAL

## 2024-02-21 DIAGNOSIS — C18.6 MALIGNANT NEOPLASM OF DESCENDING COLON (HCC): ICD-10-CM

## 2024-02-21 PROCEDURE — 2500000003 HC RX 250 WO HCPCS: Performed by: SURGERY

## 2024-02-21 PROCEDURE — 71260 CT THORAX DX C+: CPT

## 2024-02-21 PROCEDURE — 6360000004 HC RX CONTRAST MEDICATION: Performed by: SURGERY

## 2024-02-21 RX ADMIN — BARIUM SULFATE 450 ML: 20 SUSPENSION ORAL at 14:54

## 2024-02-21 RX ADMIN — IOPAMIDOL 100 ML: 755 INJECTION, SOLUTION INTRAVENOUS at 14:53

## 2024-02-27 ENCOUNTER — PREP FOR PROCEDURE (OUTPATIENT)
Dept: SURGERY | Age: 49
End: 2024-02-27

## 2024-02-27 ENCOUNTER — TELEPHONE (OUTPATIENT)
Dept: SURGERY | Age: 49
End: 2024-02-27

## 2024-02-27 RX ORDER — SODIUM CHLORIDE 9 MG/ML
INJECTION, SOLUTION INTRAVENOUS CONTINUOUS
Status: CANCELLED | OUTPATIENT
Start: 2024-02-27

## 2024-02-27 NOTE — TELEPHONE ENCOUNTER
Left voicemail to let patient know FMLA is completed; release needs signed and need payment before paperwork can be faxed/given to patient

## 2024-02-28 ENCOUNTER — ANESTHESIA (OUTPATIENT)
Dept: OPERATING ROOM | Age: 49
End: 2024-02-28
Payer: COMMERCIAL

## 2024-02-28 ENCOUNTER — HOSPITAL ENCOUNTER (INPATIENT)
Age: 49
LOS: 4 days | Discharge: HOME | DRG: 331 | End: 2024-03-03
Attending: SURGERY | Admitting: SURGERY
Payer: COMMERCIAL

## 2024-02-28 ENCOUNTER — ANESTHESIA EVENT (OUTPATIENT)
Dept: OPERATING ROOM | Age: 49
End: 2024-02-28
Payer: COMMERCIAL

## 2024-02-28 DIAGNOSIS — Z90.49 S/P PARTIAL RESECTION OF COLON: Primary | ICD-10-CM

## 2024-02-28 DIAGNOSIS — C18.9 MALIGNANT NEOPLASM OF COLON, UNSPECIFIED PART OF COLON (HCC): ICD-10-CM

## 2024-02-28 LAB
ABO: NORMAL
ANION GAP SERPL CALC-SCNC: 15 MEQ/L (ref 8–16)
ANTIBODY SCREEN: NORMAL
BUN SERPL-MCNC: 10 MG/DL (ref 7–22)
CALCIUM SERPL-MCNC: 9.7 MG/DL (ref 8.5–10.5)
CHLORIDE SERPL-SCNC: 103 MEQ/L (ref 98–111)
CO2 SERPL-SCNC: 23 MEQ/L (ref 23–33)
CREAT SERPL-MCNC: 1.1 MG/DL (ref 0.4–1.2)
DEPRECATED RDW RBC AUTO: 38.9 FL (ref 35–45)
ERYTHROCYTE [DISTWIDTH] IN BLOOD BY AUTOMATED COUNT: 12.7 % (ref 11.5–14.5)
GFR SERPL CREATININE-BSD FRML MDRD: > 60 ML/MIN/1.73M2
GLUCOSE SERPL-MCNC: 104 MG/DL (ref 70–108)
HCT VFR BLD AUTO: 49 % (ref 42–52)
HGB BLD-MCNC: 17.2 GM/DL (ref 14–18)
MCH RBC QN AUTO: 29.9 PG (ref 26–33)
MCHC RBC AUTO-ENTMCNC: 35.1 GM/DL (ref 32.2–35.5)
MCV RBC AUTO: 85.2 FL (ref 80–94)
PLATELET # BLD AUTO: 227 THOU/MM3 (ref 130–400)
PMV BLD AUTO: 11.9 FL (ref 9.4–12.4)
POTASSIUM SERPL-SCNC: 3.9 MEQ/L (ref 3.5–5.2)
RBC # BLD AUTO: 5.75 MILL/MM3 (ref 4.7–6.1)
RH FACTOR: NORMAL
SODIUM SERPL-SCNC: 141 MEQ/L (ref 135–145)
WBC # BLD AUTO: 7.5 THOU/MM3 (ref 4.8–10.8)

## 2024-02-28 PROCEDURE — 86901 BLOOD TYPING SEROLOGIC RH(D): CPT

## 2024-02-28 PROCEDURE — 7100000001 HC PACU RECOVERY - ADDTL 15 MIN: Performed by: SURGERY

## 2024-02-28 PROCEDURE — C1769 GUIDE WIRE: HCPCS | Performed by: SURGERY

## 2024-02-28 PROCEDURE — 88307 TISSUE EXAM BY PATHOLOGIST: CPT

## 2024-02-28 PROCEDURE — 2500000003 HC RX 250 WO HCPCS: Performed by: NURSE ANESTHETIST, CERTIFIED REGISTERED

## 2024-02-28 PROCEDURE — 2709999900 HC NON-CHARGEABLE SUPPLY: Performed by: SURGERY

## 2024-02-28 PROCEDURE — 6360000002 HC RX W HCPCS

## 2024-02-28 PROCEDURE — 8E0W0CZ ROBOTIC ASSISTED PROCEDURE OF TRUNK REGION, OPEN APPROACH: ICD-10-PCS | Performed by: SURGERY

## 2024-02-28 PROCEDURE — 7100000000 HC PACU RECOVERY - FIRST 15 MIN: Performed by: SURGERY

## 2024-02-28 PROCEDURE — C1758 CATHETER, URETERAL: HCPCS | Performed by: SURGERY

## 2024-02-28 PROCEDURE — 6360000002 HC RX W HCPCS: Performed by: SURGERY

## 2024-02-28 PROCEDURE — 6360000002 HC RX W HCPCS: Performed by: NURSE ANESTHETIST, CERTIFIED REGISTERED

## 2024-02-28 PROCEDURE — 3600000004 HC SURGERY LEVEL 4 BASE: Performed by: SURGERY

## 2024-02-28 PROCEDURE — 0DTN0ZZ RESECTION OF SIGMOID COLON, OPEN APPROACH: ICD-10-PCS | Performed by: SURGERY

## 2024-02-28 PROCEDURE — 86850 RBC ANTIBODY SCREEN: CPT

## 2024-02-28 PROCEDURE — 2580000003 HC RX 258: Performed by: NURSE PRACTITIONER

## 2024-02-28 PROCEDURE — 1200000000 HC SEMI PRIVATE

## 2024-02-28 PROCEDURE — 44145 PARTIAL REMOVAL OF COLON: CPT | Performed by: SURGERY

## 2024-02-28 PROCEDURE — P9045 ALBUMIN (HUMAN), 5%, 250 ML: HCPCS | Performed by: NURSE ANESTHETIST, CERTIFIED REGISTERED

## 2024-02-28 PROCEDURE — 3700000001 HC ADD 15 MINUTES (ANESTHESIA): Performed by: SURGERY

## 2024-02-28 PROCEDURE — 6360000002 HC RX W HCPCS: Performed by: NURSE PRACTITIONER

## 2024-02-28 PROCEDURE — 3700000000 HC ANESTHESIA ATTENDED CARE: Performed by: SURGERY

## 2024-02-28 PROCEDURE — 6370000000 HC RX 637 (ALT 250 FOR IP): Performed by: NURSE PRACTITIONER

## 2024-02-28 PROCEDURE — 2500000003 HC RX 250 WO HCPCS: Performed by: SURGERY

## 2024-02-28 PROCEDURE — 2720000010 HC SURG SUPPLY STERILE: Performed by: SURGERY

## 2024-02-28 PROCEDURE — 6360000002 HC RX W HCPCS: Performed by: ANESTHESIOLOGY

## 2024-02-28 PROCEDURE — 36415 COLL VENOUS BLD VENIPUNCTURE: CPT

## 2024-02-28 PROCEDURE — 85027 COMPLETE CBC AUTOMATED: CPT

## 2024-02-28 PROCEDURE — 2580000003 HC RX 258: Performed by: SURGERY

## 2024-02-28 PROCEDURE — 3600000014 HC SURGERY LEVEL 4 ADDTL 15MIN: Performed by: SURGERY

## 2024-02-28 PROCEDURE — 86900 BLOOD TYPING SEROLOGIC ABO: CPT

## 2024-02-28 PROCEDURE — 80048 BASIC METABOLIC PNL TOTAL CA: CPT

## 2024-02-28 RX ORDER — ONDANSETRON 4 MG/1
4 TABLET, ORALLY DISINTEGRATING ORAL EVERY 8 HOURS PRN
Status: DISCONTINUED | OUTPATIENT
Start: 2024-02-28 | End: 2024-03-03 | Stop reason: HOSPADM

## 2024-02-28 RX ORDER — ATROPINE SULFATE 1 MG/ML
INJECTION, SOLUTION INTRAMUSCULAR; INTRAVENOUS; SUBCUTANEOUS PRN
Status: DISCONTINUED | OUTPATIENT
Start: 2024-02-28 | End: 2024-02-28 | Stop reason: SDUPTHER

## 2024-02-28 RX ORDER — SODIUM CHLORIDE 9 MG/ML
INJECTION, SOLUTION INTRAVENOUS PRN
Status: DISCONTINUED | OUTPATIENT
Start: 2024-02-28 | End: 2024-03-03 | Stop reason: HOSPADM

## 2024-02-28 RX ORDER — FENTANYL CITRATE 50 UG/ML
50 INJECTION, SOLUTION INTRAMUSCULAR; INTRAVENOUS EVERY 5 MIN PRN
Status: DISCONTINUED | OUTPATIENT
Start: 2024-02-28 | End: 2024-02-28 | Stop reason: HOSPADM

## 2024-02-28 RX ORDER — ROCURONIUM BROMIDE 10 MG/ML
INJECTION, SOLUTION INTRAVENOUS PRN
Status: DISCONTINUED | OUTPATIENT
Start: 2024-02-28 | End: 2024-02-28 | Stop reason: SDUPTHER

## 2024-02-28 RX ORDER — SODIUM CHLORIDE 9 MG/ML
INJECTION, SOLUTION INTRAVENOUS CONTINUOUS
Status: DISCONTINUED | OUTPATIENT
Start: 2024-02-28 | End: 2024-02-28 | Stop reason: HOSPADM

## 2024-02-28 RX ORDER — PHENYLEPHRINE HCL IN 0.9% NACL 1 MG/10 ML
SYRINGE (ML) INTRAVENOUS PRN
Status: DISCONTINUED | OUTPATIENT
Start: 2024-02-28 | End: 2024-02-28 | Stop reason: SDUPTHER

## 2024-02-28 RX ORDER — FENTANYL CITRATE 50 UG/ML
50 INJECTION, SOLUTION INTRAMUSCULAR; INTRAVENOUS EVERY 5 MIN PRN
Status: DISCONTINUED | OUTPATIENT
Start: 2024-02-28 | End: 2024-02-28

## 2024-02-28 RX ORDER — SODIUM CHLORIDE 0.9 % (FLUSH) 0.9 %
5-40 SYRINGE (ML) INJECTION PRN
Status: DISCONTINUED | OUTPATIENT
Start: 2024-02-28 | End: 2024-02-28 | Stop reason: HOSPADM

## 2024-02-28 RX ORDER — FENTANYL CITRATE 50 UG/ML
INJECTION, SOLUTION INTRAMUSCULAR; INTRAVENOUS
Status: COMPLETED
Start: 2024-02-28 | End: 2024-02-28

## 2024-02-28 RX ORDER — MORPHINE SULFATE 2 MG/ML
2 INJECTION, SOLUTION INTRAMUSCULAR; INTRAVENOUS EVERY 5 MIN PRN
Status: COMPLETED | OUTPATIENT
Start: 2024-02-28 | End: 2024-02-28

## 2024-02-28 RX ORDER — SODIUM CHLORIDE 0.9 % (FLUSH) 0.9 %
5-40 SYRINGE (ML) INJECTION EVERY 12 HOURS SCHEDULED
Status: DISCONTINUED | OUTPATIENT
Start: 2024-02-28 | End: 2024-03-03 | Stop reason: HOSPADM

## 2024-02-28 RX ORDER — ONDANSETRON 2 MG/ML
4 INJECTION INTRAMUSCULAR; INTRAVENOUS EVERY 6 HOURS PRN
Status: DISCONTINUED | OUTPATIENT
Start: 2024-02-28 | End: 2024-03-03 | Stop reason: HOSPADM

## 2024-02-28 RX ORDER — ALBUMIN, HUMAN INJ 5% 5 %
SOLUTION INTRAVENOUS PRN
Status: DISCONTINUED | OUTPATIENT
Start: 2024-02-28 | End: 2024-02-28 | Stop reason: SDUPTHER

## 2024-02-28 RX ORDER — MORPHINE SULFATE 2 MG/ML
2 INJECTION, SOLUTION INTRAMUSCULAR; INTRAVENOUS EVERY 4 HOURS PRN
Status: DISCONTINUED | OUTPATIENT
Start: 2024-02-28 | End: 2024-02-28 | Stop reason: ALTCHOICE

## 2024-02-28 RX ORDER — MIDAZOLAM HYDROCHLORIDE 1 MG/ML
INJECTION INTRAMUSCULAR; INTRAVENOUS PRN
Status: DISCONTINUED | OUTPATIENT
Start: 2024-02-28 | End: 2024-02-28 | Stop reason: SDUPTHER

## 2024-02-28 RX ORDER — PROPOFOL 10 MG/ML
INJECTION, EMULSION INTRAVENOUS PRN
Status: DISCONTINUED | OUTPATIENT
Start: 2024-02-28 | End: 2024-02-28 | Stop reason: SDUPTHER

## 2024-02-28 RX ORDER — DEXTROSE, SODIUM CHLORIDE, SODIUM LACTATE, POTASSIUM CHLORIDE, AND CALCIUM CHLORIDE 5; .6; .31; .03; .02 G/100ML; G/100ML; G/100ML; G/100ML; G/100ML
INJECTION, SOLUTION INTRAVENOUS CONTINUOUS
Status: DISCONTINUED | OUTPATIENT
Start: 2024-02-28 | End: 2024-02-28

## 2024-02-28 RX ORDER — LIDOCAINE HCL/PF 100 MG/5ML
SYRINGE (ML) INJECTION PRN
Status: DISCONTINUED | OUTPATIENT
Start: 2024-02-28 | End: 2024-02-28 | Stop reason: SDUPTHER

## 2024-02-28 RX ORDER — BUPIVACAINE HYDROCHLORIDE AND EPINEPHRINE 5; 5 MG/ML; UG/ML
INJECTION, SOLUTION EPIDURAL; INTRACAUDAL; PERINEURAL PRN
Status: DISCONTINUED | OUTPATIENT
Start: 2024-02-28 | End: 2024-02-28 | Stop reason: ALTCHOICE

## 2024-02-28 RX ORDER — ONDANSETRON 2 MG/ML
INJECTION INTRAMUSCULAR; INTRAVENOUS PRN
Status: DISCONTINUED | OUTPATIENT
Start: 2024-02-28 | End: 2024-02-28 | Stop reason: SDUPTHER

## 2024-02-28 RX ORDER — GLYCOPYRROLATE 0.2 MG/ML
INJECTION INTRAMUSCULAR; INTRAVENOUS PRN
Status: DISCONTINUED | OUTPATIENT
Start: 2024-02-28 | End: 2024-02-28 | Stop reason: SDUPTHER

## 2024-02-28 RX ORDER — ENOXAPARIN SODIUM 100 MG/ML
30 INJECTION SUBCUTANEOUS 2 TIMES DAILY
Status: DISCONTINUED | OUTPATIENT
Start: 2024-02-28 | End: 2024-03-03 | Stop reason: HOSPADM

## 2024-02-28 RX ORDER — VASOPRESSIN 20 U/ML
INJECTION PARENTERAL PRN
Status: DISCONTINUED | OUTPATIENT
Start: 2024-02-28 | End: 2024-02-28 | Stop reason: SDUPTHER

## 2024-02-28 RX ORDER — FENTANYL CITRATE 50 UG/ML
INJECTION, SOLUTION INTRAMUSCULAR; INTRAVENOUS PRN
Status: DISCONTINUED | OUTPATIENT
Start: 2024-02-28 | End: 2024-02-28 | Stop reason: SDUPTHER

## 2024-02-28 RX ORDER — SODIUM CHLORIDE 9 MG/ML
INJECTION, SOLUTION INTRAVENOUS CONTINUOUS
Status: DISCONTINUED | OUTPATIENT
Start: 2024-02-28 | End: 2024-03-03 | Stop reason: HOSPADM

## 2024-02-28 RX ORDER — SODIUM CHLORIDE 0.9 % (FLUSH) 0.9 %
5-40 SYRINGE (ML) INJECTION EVERY 12 HOURS SCHEDULED
Status: DISCONTINUED | OUTPATIENT
Start: 2024-02-28 | End: 2024-02-28 | Stop reason: HOSPADM

## 2024-02-28 RX ORDER — MAGNESIUM SULFATE 1 G/100ML
1000 INJECTION INTRAVENOUS PRN
Status: DISCONTINUED | OUTPATIENT
Start: 2024-02-28 | End: 2024-03-03 | Stop reason: HOSPADM

## 2024-02-28 RX ORDER — DIAZEPAM 5 MG/ML
5 INJECTION, SOLUTION INTRAMUSCULAR; INTRAVENOUS EVERY 4 HOURS
Status: DISCONTINUED | OUTPATIENT
Start: 2024-02-28 | End: 2024-03-03 | Stop reason: HOSPADM

## 2024-02-28 RX ORDER — SODIUM CHLORIDE 0.9 % (FLUSH) 0.9 %
5-40 SYRINGE (ML) INJECTION PRN
Status: DISCONTINUED | OUTPATIENT
Start: 2024-02-28 | End: 2024-03-03 | Stop reason: HOSPADM

## 2024-02-28 RX ORDER — LABETALOL HYDROCHLORIDE 5 MG/ML
10 INJECTION, SOLUTION INTRAVENOUS
Status: DISCONTINUED | OUTPATIENT
Start: 2024-02-28 | End: 2024-02-28 | Stop reason: HOSPADM

## 2024-02-28 RX ORDER — POTASSIUM CHLORIDE 7.45 MG/ML
10 INJECTION INTRAVENOUS PRN
Status: DISCONTINUED | OUTPATIENT
Start: 2024-02-28 | End: 2024-03-03 | Stop reason: ALTCHOICE

## 2024-02-28 RX ORDER — DEXAMETHASONE SODIUM PHOSPHATE 10 MG/ML
INJECTION, EMULSION INTRAMUSCULAR; INTRAVENOUS PRN
Status: DISCONTINUED | OUTPATIENT
Start: 2024-02-28 | End: 2024-02-28 | Stop reason: SDUPTHER

## 2024-02-28 RX ORDER — SODIUM CHLORIDE 9 MG/ML
INJECTION, SOLUTION INTRAVENOUS PRN
Status: DISCONTINUED | OUTPATIENT
Start: 2024-02-28 | End: 2024-02-28 | Stop reason: HOSPADM

## 2024-02-28 RX ADMIN — Medication 80 MG: at 07:53

## 2024-02-28 RX ADMIN — FENTANYL CITRATE 50 MCG: 50 INJECTION INTRAMUSCULAR; INTRAVENOUS at 11:02

## 2024-02-28 RX ADMIN — SODIUM CHLORIDE: 9 INJECTION, SOLUTION INTRAVENOUS at 15:56

## 2024-02-28 RX ADMIN — DIAZEPAM 5 MG: 5 INJECTION, SOLUTION INTRAMUSCULAR; INTRAVENOUS at 15:50

## 2024-02-28 RX ADMIN — HYDROMORPHONE HYDROCHLORIDE 0.5 MG: 1 INJECTION, SOLUTION INTRAMUSCULAR; INTRAVENOUS; SUBCUTANEOUS at 11:44

## 2024-02-28 RX ADMIN — GLYCOPYRROLATE 0.2 MG: 0.2 INJECTION INTRAMUSCULAR; INTRAVENOUS at 08:08

## 2024-02-28 RX ADMIN — HYDROMORPHONE HYDROCHLORIDE 0.5 MG: 1 INJECTION, SOLUTION INTRAMUSCULAR; INTRAVENOUS; SUBCUTANEOUS at 11:34

## 2024-02-28 RX ADMIN — HYDROMORPHONE HYDROCHLORIDE 0.5 MG: 1 INJECTION, SOLUTION INTRAMUSCULAR; INTRAVENOUS; SUBCUTANEOUS at 23:05

## 2024-02-28 RX ADMIN — MORPHINE SULFATE 2 MG: 2 INJECTION, SOLUTION INTRAMUSCULAR; INTRAVENOUS at 13:49

## 2024-02-28 RX ADMIN — FENTANYL CITRATE 50 MCG: 50 INJECTION INTRAMUSCULAR; INTRAVENOUS at 11:29

## 2024-02-28 RX ADMIN — SODIUM CHLORIDE: 9 INJECTION, SOLUTION INTRAVENOUS at 08:48

## 2024-02-28 RX ADMIN — VASOPRESSIN 3 UNITS: 20 INJECTION INTRAVENOUS at 08:50

## 2024-02-28 RX ADMIN — FENTANYL CITRATE 50 MCG: 50 INJECTION INTRAMUSCULAR; INTRAVENOUS at 10:26

## 2024-02-28 RX ADMIN — ATROPINE SULFATE 0.8 MG: 1 INJECTION, SOLUTION INTRAMUSCULAR; INTRAVENOUS; SUBCUTANEOUS at 08:46

## 2024-02-28 RX ADMIN — Medication 200 MCG: at 09:21

## 2024-02-28 RX ADMIN — MORPHINE SULFATE 2 MG: 2 INJECTION, SOLUTION INTRAMUSCULAR; INTRAVENOUS at 12:17

## 2024-02-28 RX ADMIN — SODIUM CHLORIDE: 9 INJECTION, SOLUTION INTRAVENOUS at 11:04

## 2024-02-28 RX ADMIN — SODIUM CHLORIDE: 9 INJECTION, SOLUTION INTRAVENOUS at 09:53

## 2024-02-28 RX ADMIN — VASOPRESSIN 1 UNITS: 20 INJECTION INTRAVENOUS at 08:16

## 2024-02-28 RX ADMIN — HYDROMORPHONE HYDROCHLORIDE 0.5 MG: 1 INJECTION, SOLUTION INTRAMUSCULAR; INTRAVENOUS; SUBCUTANEOUS at 11:15

## 2024-02-28 RX ADMIN — Medication 200 MCG: at 09:12

## 2024-02-28 RX ADMIN — ENOXAPARIN SODIUM 30 MG: 100 INJECTION SUBCUTANEOUS at 21:44

## 2024-02-28 RX ADMIN — FENTANYL CITRATE 50 MCG: 50 INJECTION INTRAMUSCULAR; INTRAVENOUS at 11:39

## 2024-02-28 RX ADMIN — VASOPRESSIN 3 UNITS: 20 INJECTION INTRAVENOUS at 08:21

## 2024-02-28 RX ADMIN — MIDAZOLAM 2 MG: 1 INJECTION INTRAMUSCULAR; INTRAVENOUS at 07:48

## 2024-02-28 RX ADMIN — ROCURONIUM BROMIDE 20 MG: 10 INJECTION INTRAVENOUS at 09:53

## 2024-02-28 RX ADMIN — DIAZEPAM 5 MG: 5 INJECTION, SOLUTION INTRAMUSCULAR; INTRAVENOUS at 21:30

## 2024-02-28 RX ADMIN — ROCURONIUM BROMIDE 25 MG: 10 INJECTION INTRAVENOUS at 09:04

## 2024-02-28 RX ADMIN — CEFOXITIN 2000 MG: 2 INJECTION, POWDER, FOR SOLUTION INTRAVENOUS at 07:42

## 2024-02-28 RX ADMIN — HYDROMORPHONE HYDROCHLORIDE 0.5 MG: 1 INJECTION, SOLUTION INTRAMUSCULAR; INTRAVENOUS; SUBCUTANEOUS at 16:51

## 2024-02-28 RX ADMIN — MORPHINE SULFATE 2 MG: 2 INJECTION, SOLUTION INTRAMUSCULAR; INTRAVENOUS at 12:12

## 2024-02-28 RX ADMIN — DEXAMETHASONE SODIUM PHOSPHATE 10 MG: 10 INJECTION, EMULSION INTRAMUSCULAR; INTRAVENOUS at 08:10

## 2024-02-28 RX ADMIN — CEFOXITIN 2000 MG: 2 INJECTION, POWDER, FOR SOLUTION INTRAVENOUS at 09:35

## 2024-02-28 RX ADMIN — HYDROMORPHONE HYDROCHLORIDE 0.5 MG: 1 INJECTION, SOLUTION INTRAMUSCULAR; INTRAVENOUS; SUBCUTANEOUS at 11:20

## 2024-02-28 RX ADMIN — NALOXEGOL OXALATE 12.5 MG: 12.5 TABLET, FILM COATED ORAL at 06:45

## 2024-02-28 RX ADMIN — FENTANYL CITRATE 100 MCG: 50 INJECTION INTRAMUSCULAR; INTRAVENOUS at 07:49

## 2024-02-28 RX ADMIN — SODIUM CHLORIDE: 9 INJECTION, SOLUTION INTRAVENOUS at 06:32

## 2024-02-28 RX ADMIN — ROCURONIUM BROMIDE 25 MG: 10 INJECTION INTRAVENOUS at 08:23

## 2024-02-28 RX ADMIN — PROPOFOL 200 MG: 10 INJECTION, EMULSION INTRAVENOUS at 07:54

## 2024-02-28 RX ADMIN — ROCURONIUM BROMIDE 50 MG: 10 INJECTION INTRAVENOUS at 07:54

## 2024-02-28 RX ADMIN — ALBUMIN (HUMAN) 12.5 G: 12.5 INJECTION, SOLUTION INTRAVENOUS at 09:12

## 2024-02-28 RX ADMIN — PIPERACILLIN AND TAZOBACTAM 3375 MG: 3; .375 INJECTION, POWDER, LYOPHILIZED, FOR SOLUTION INTRAVENOUS at 17:14

## 2024-02-28 RX ADMIN — ONDANSETRON 4 MG: 2 INJECTION INTRAMUSCULAR; INTRAVENOUS at 10:33

## 2024-02-28 RX ADMIN — FENTANYL CITRATE 50 MCG: 50 INJECTION INTRAMUSCULAR; INTRAVENOUS at 09:52

## 2024-02-28 RX ADMIN — SUGAMMADEX 200 MG: 100 INJECTION, SOLUTION INTRAVENOUS at 10:52

## 2024-02-28 RX ADMIN — HYDROMORPHONE HYDROCHLORIDE 0.5 MG: 1 INJECTION, SOLUTION INTRAMUSCULAR; INTRAVENOUS; SUBCUTANEOUS at 19:57

## 2024-02-28 RX ADMIN — Medication 200 MCG: at 09:05

## 2024-02-28 RX ADMIN — FENTANYL CITRATE 50 MCG: 50 INJECTION INTRAMUSCULAR; INTRAVENOUS at 12:07

## 2024-02-28 ASSESSMENT — PAIN SCALES - GENERAL
PAINLEVEL_OUTOF10: 8
PAINLEVEL_OUTOF10: 6
PAINLEVEL_OUTOF10: 9
PAINLEVEL_OUTOF10: 7
PAINLEVEL_OUTOF10: 6
PAINLEVEL_OUTOF10: 8
PAINLEVEL_OUTOF10: 9
PAINLEVEL_OUTOF10: 10
PAINLEVEL_OUTOF10: 6
PAINLEVEL_OUTOF10: 8
PAINLEVEL_OUTOF10: 7
PAINLEVEL_OUTOF10: 8
PAINLEVEL_OUTOF10: 8
PAINLEVEL_OUTOF10: 10
PAINLEVEL_OUTOF10: 10

## 2024-02-28 ASSESSMENT — PAIN DESCRIPTION - LOCATION
LOCATION: ABDOMEN

## 2024-02-28 ASSESSMENT — PAIN DESCRIPTION - DESCRIPTORS
DESCRIPTORS: ACHING;CRAMPING
DESCRIPTORS: SORE;DISCOMFORT
DESCRIPTORS: CRAMPING;SPASM
DESCRIPTORS: CRAMPING;SPASM
DESCRIPTORS: ACHING;CRAMPING
DESCRIPTORS: ACHING;CRAMPING

## 2024-02-28 ASSESSMENT — PAIN DESCRIPTION - ORIENTATION
ORIENTATION: RIGHT;LEFT;MID
ORIENTATION: MID
ORIENTATION: MID
ORIENTATION: RIGHT;LEFT;MID

## 2024-02-28 NOTE — PROGRESS NOTES
Patient admitted to [facility] room 20 with family at bedside. Bed in low position side rails up call light in reach. Patient denies questions at this time.

## 2024-02-28 NOTE — H&P
Caroline Covington MD  [facility] Surgery Clinic H&P    Pt Name: Noe Moctezuma  MRN: [id number]  YOB: 1975  Date of evaluation: 02/28/24    Primary Care Physician: Iliana Mejia MD  Patient evaluated at the request of  Dr. Vasquez  Reason for evaluation: colon mass  IMPRESSIONS:       ICD-10-CM    1. Mass of colon  K63.89 CEA      2. Hematochezia  K92.1 CEA        does not have any pertinent problems on file.    PLANS:   Patient with descending colon mass suspicious for colon malignancy.  Will treat as such and start aggressive workup while pathology is pending.  CEA, CT abdomen and pelvis  and CT chest   have been ordered.  Tentatively will schedule the patient for robotic assisted left colectomy.  Will place preoperative ureteral stents by urology for identification of ureters.  I discussed with patient pathophysiology disease, I reviewed with him the surgery.  Discussed with him robotic versus open left colectomy with anastomosis.  Discussed with him the risks and benefits risk include but not limited to bleeding infection demonstrating structures need for more procedures anesthesia complications as well as possible colostomy.  Patient expressed understanding would like to proceed with robotic assisted left colectomy with anastomosis.    SUBJECTIVE:   CC: Colon mass    History of Chief Complaint:    Noe is a 48 y.o.male who underwent a colonoscopy today.  Patient for the last year has been having intermittent abdominal pain right before he has a bowel movement its mostly in his left lower quadrant.  He is also had intermittent bloody bowel movements.  This is coming gone but is getting more frequent in nature.  He denies any unintentional weight loss.  He has had some intentional weight loss with diet modification.  No family history of colon cancer.  Patient had a colonoscopy 4 years ago that was unremarkable per report.  His colonoscopy today there was a nonobstructing mass.  In the descending

## 2024-02-28 NOTE — ANESTHESIA PRE PROCEDURE
(232 lb 12.9 oz)   02/14/24 105 kg (231 lb 6.4 oz)     Body mass index is 30.49 kg/m².    CBC:   Lab Results   Component Value Date/Time    WBC 7.5 02/28/2024 06:18 AM    RBC 5.75 02/28/2024 06:18 AM    HGB 17.2 02/28/2024 06:18 AM    HCT 49.0 02/28/2024 06:18 AM    MCV 85.2 02/28/2024 06:18 AM    RDW 13.2 05/28/2020 09:18 AM     02/28/2024 06:18 AM       CMP:   Lab Results   Component Value Date/Time     02/28/2024 06:18 AM    K 3.9 02/28/2024 06:18 AM     02/28/2024 06:18 AM    CO2 23 02/28/2024 06:18 AM    BUN 10 02/28/2024 06:18 AM    CREATININE 1.1 02/28/2024 06:18 AM    GFRAA >60 05/28/2020 09:18 AM    AGRATIO 1.5 05/28/2020 09:18 AM    LABGLOM >60 02/28/2024 06:18 AM    GLUCOSE 104 02/28/2024 06:18 AM    PROT 7.5 05/28/2020 09:18 AM    CALCIUM 9.7 02/28/2024 06:18 AM    BILITOT 0.56 05/28/2020 09:18 AM    ALKPHOS 60 05/28/2020 09:18 AM    AST 37 05/28/2020 09:18 AM    ALT 59 05/28/2020 09:18 AM       POC Tests: No results for input(s): \"POCGLU\", \"POCNA\", \"POCK\", \"POCCL\", \"POCBUN\", \"POCHEMO\", \"POCHCT\" in the last 72 hours.    Coags: No results found for: \"PROTIME\", \"INR\", \"APTT\"    HCG (If Applicable): No results found for: \"PREGTESTUR\", \"PREGSERUM\", \"HCG\", \"HCGQUANT\"     ABGs: No results found for: \"PHART\", \"PO2ART\", \"SCX5GQI\", \"HHV4AIS\", \"BEART\", \"M0CMBRZM\"     Type & Screen (If Applicable):  No results found for: \"LABABO\", \"LABRH\"    Drug/Infectious Status (If Applicable):  No results found for: \"HIV\", \"HEPCAB\"    COVID-19 Screening (If Applicable): No results found for: \"COVID19\"        Anesthesia Evaluation    Airway: Mallampati: II  TM distance: >3 FB   Neck ROM: full  Mouth opening: > = 3 FB   Dental:          Pulmonary: breath sounds clear to auscultation                             Cardiovascular:    (+) hypertension:        Rhythm: regular                      Neuro/Psych:   (+) psychiatric history:            GI/Hepatic/Renal:   (+) PUD          Endo/Other:

## 2024-02-28 NOTE — OP NOTE
Operative Note      Patient: Noe Moctezuma  YOB: 1975  MRN: [id number]    Date of Procedure: 2/28/2024    Pre-Op Diagnosis Codes:     * Malignant neoplasm of colon, unspecified part of colon (HCC) [C18.9]    Post-Op Diagnosis: Same with need for ureteral identification via ureteral catheters       Procedure(s):  Robotic Left Sigmoid Colon Resection, Poss Open    Surgeon(s):  Caroline Covington MD Nicholson, Craig A, MD    Assistant:   * No surgical staff found *    Anesthesia: General    Estimated Blood Loss (mL): Minimal    Complications: None    Specimens:   * No specimens in log *    Implants:  * No implants in log *      Drains: * No LDAs found *    Findings: bilateral ureteral catheters placed     Detailed Description of Procedure:   Corey was taken to the OR and positioned on the OR table.  After initiation of anesthesia he was placed in lithotomy position and his penis and groin were prepped and he was draped in the standard fashion for cystoscopy.    A rigid cystoscope was passed per urethra and the right and left ureteral orifices identified.  Starting on the right, a ureteral catheter was passed up to the level of the kidney.  With this done, the catheter was left indwelling and the scope removed.  The procedure was attempted on the left side but the ureteral orifice was small and the catheter could not pass up the ureter.  I attempted to pass a wire and this too would not advance up the ureter.  With the left sided catheter / stent being the most important of the two, I felt that ureteroscopy was indicated in order to pass the catheter.     A semi-rigid ureteroscope was passed per urethra and the left ureteral orifice identified.  The scope was passed up the left ureter where there ureter made a very sharp turn and was likely the cause of the inability to pass the catheter or wire.  With the scope into the distal ureter, a wire was passed through the scope and up to the left kidney.  The 
divided with a blue load MILAD staplers.  The remainder of the transverse colon was then freed from the greater omentum as well as the hepatic flexure was liberated.  This gave enough mobility to get the 2 ends of the colon to meet without tension.  Patient had a short nephrectomy EEA stapler was able to be passed through the rectum and come out the end of the rectal stump.  Decision was made to do a EEA firing anastomosis.  The end of the colon was staple and was removed and a pursestring Prolene suture was placed.  The anvil was inserted and secured.  EEA stapler was then inserted into the rectum and advanced to the staple line.  The spike was deployed.  It was connected to the anvil.  It was ensured there was no kinking.  The stapler was fired.  The anastomosis laid nicely.  Leak test was performed by filling the pelvis with warm saline.  The rectum was then infiltrated with air was submerged.  There was no bubbling.  It was then infiltrated with Betadine and there was no bubbling.  Attention was then made to ensure that small bowel was to the right of the descending colon.  The abdominal cavity was then inspected, there were no masses in the liver.  No other findings that were concerning for metastatic disease.  Abdominal cavity was then irrigated with Irrisept that was allowed to instill for 2 minutes.  At this time a closing table was brought in.  All scrubbed participants were read gowned.  The patient was then redraped.  The Myron wound protector was removed.  The Irrisept solution was evacuated.  There was no bleeding.  Tisseel was then placed around the anastomosis to aid in hemostasis and ensured good integrity.  The omentum partial meniscectomy was then done as there were noted to be spaces within it that could lead to an internal hernia.  After this was done the omentum was placed down over the anastomosis.  Small bowel remained to the right of the colon traversing the left side of the abdomen.  The

## 2024-02-28 NOTE — PROGRESS NOTES
1108- Pt to pacu. Pt reporting significant pain. Medicated per anesthesia.    1114- pt continues to report pain.continue to medicate per order. VSS    1126- pt continues to report pain, continue to medicate    1132- pt continues to report pain, medicated per orders.    1141- pt reports pain improved. Not yet tolerable.    1149- pt reports tolerable pain at this time.    1152- called to 6A nurse Graff, notified unit just received order for pedi admit, waiting to get pt new bed assignment.    1204-pt resting, snoring resp off and on. Pt reports pain tolerable, uncomfortable    1207-pt grimaces, reports pain increasing, medicated per order.    1216- pt continues to report intolerable pain level.    1222- pt reports pain tolerable at this time, returns to int. Snoring.    1225-call to 5K nurse Meléndez, report given, call to spouse, went to voicemail. Call to [facility] to update spouse on new room, status of pt.    1234- pt with snoring resp. VSS. Pt appreciative of care.    1237- pt meets criteria for discharge from pacu, awaiting transport.    1242- pt resting in bed eyes closed. Snoring resp.     1251-pt resting in bed eyes closed.     1326- Transport arrives to take pt to 5K 26

## 2024-02-28 NOTE — ANESTHESIA POSTPROCEDURE EVALUATION
Department of Anesthesiology  Postprocedure Note    Patient: Noe Moctezuma  MRN: [id number]  YOB: 1975  Date of evaluation: 2/28/2024    Procedure Summary       Date: 02/28/24 Room / Location[facility] OR 08 / [facility]    Anesthesia Start: 0745 Anesthesia Stop: 1113    Procedures:       Robotic Left Sigmoid Colon Resection attempted, converted to open procedure (Abdomen)      CYSTOSCOPY URETERAL STENT INSERTION, kim placement (Bilateral: Ureter) Diagnosis:       Malignant neoplasm of colon, unspecified part of colon (HCC)      (Malignant neoplasm of colon, unspecified part of colon (HCC) [C18.9])    Surgeons: Caroline Covington MD; Jos Ng MD Responsible Provider: Jun Sidhu MD    Anesthesia Type: general ASA Status: 2            Anesthesia Type: No value filed.    Dudley Phase I: Dudley Score: 8    Dudley Phase II:      Anesthesia Post Evaluation    Patient location during evaluation: PACU  Patient participation: complete - patient participated  Level of consciousness: awake  Airway patency: patent  Nausea & Vomiting: no vomiting and no nausea  Cardiovascular status: hemodynamically stable  Respiratory status: acceptable and nasal cannula  Hydration status: stable  Pain management: adequate    No notable events documented.

## 2024-02-28 NOTE — H&P
Noe was seen today in consultation for ureteral catheter / stent placement bilaterally.  Consultation was requested by Dr. Covington.    His HPI:   Noe is a 48 y.o.male who underwent a colonoscopy today.  Patient for the last year has been having intermittent abdominal pain right before he has a bowel movement its mostly in his left lower quadrant.  He is also had intermittent bloody bowel movements.  This is coming gone but is getting more frequent in nature.  He denies any unintentional weight loss.  He has had some intentional weight loss with diet modification.  No family history of colon cancer.  Patient had a colonoscopy 4 years ago that was unremarkable per report.  His colonoscopy today there was a nonobstructing mass.  In the descending colon.  This was tattooed     He will be having a robotic colectomy and I was asked to place catheter for potential ureteral identification during the surgery.    Past Medical History:   Diagnosis Date    Allergic rhinitis     spring allergies    Arthritis     hands    Cancer (HCC) 02/14/2024    colon cancer    Colitis     Diverticulitis     Hyperlipidemia        Past Surgical History:   Procedure Laterality Date    APPENDECTOMY  01/13/2023    COLONOSCOPY  2011    COLONOSCOPY  06/08/2020    [id number]    COLONOSCOPY  02/14/2024    Dr. Vasquez    HERNIA REPAIR  2007       No current facility-administered medications on file prior to encounter.     Current Outpatient Medications on File Prior to Encounter   Medication Sig Dispense Refill    vitamin C (ASCORBIC ACID) 500 MG tablet Take 1 tablet by mouth as needed (Cold symptoms)      zinc gluconate 50 MG tablet Take 1 tablet by mouth as needed (Cold symptoms)      mesalamine (CANASA) 1000 MG suppository Place 1 suppository rectally nightly 60 suppository 1    atorvastatin (LIPITOR) 40 MG tablet Take 1 tablet by mouth daily (Patient not taking: Reported on 1/18/2024) 90 tablet 3    Triamcinolone Acetonide

## 2024-02-29 PROBLEM — Z90.49 S/P PARTIAL RESECTION OF COLON: Status: ACTIVE | Noted: 2024-02-29

## 2024-02-29 LAB
ANION GAP SERPL CALC-SCNC: 13 MEQ/L (ref 8–16)
BUN SERPL-MCNC: 9 MG/DL (ref 7–22)
CALCIUM SERPL-MCNC: 9.2 MG/DL (ref 8.5–10.5)
CHLORIDE SERPL-SCNC: 102 MEQ/L (ref 98–111)
CO2 SERPL-SCNC: 21 MEQ/L (ref 23–33)
CREAT SERPL-MCNC: 0.8 MG/DL (ref 0.4–1.2)
GFR SERPL CREATININE-BSD FRML MDRD: > 60 ML/MIN/1.73M2
GLUCOSE SERPL-MCNC: 119 MG/DL (ref 70–108)
POTASSIUM SERPL-SCNC: 4.5 MEQ/L (ref 3.5–5.2)
SODIUM SERPL-SCNC: 136 MEQ/L (ref 135–145)

## 2024-02-29 PROCEDURE — 36415 COLL VENOUS BLD VENIPUNCTURE: CPT

## 2024-02-29 PROCEDURE — 6360000002 HC RX W HCPCS: Performed by: NURSE PRACTITIONER

## 2024-02-29 PROCEDURE — 2580000003 HC RX 258: Performed by: SURGERY

## 2024-02-29 PROCEDURE — APPSS30 APP SPLIT SHARED TIME 16-30 MINUTES: Performed by: NURSE PRACTITIONER

## 2024-02-29 PROCEDURE — 6370000000 HC RX 637 (ALT 250 FOR IP): Performed by: SURGERY

## 2024-02-29 PROCEDURE — 99024 POSTOP FOLLOW-UP VISIT: CPT | Performed by: NURSE PRACTITIONER

## 2024-02-29 PROCEDURE — C9113 INJ PANTOPRAZOLE SODIUM, VIA: HCPCS | Performed by: NURSE PRACTITIONER

## 2024-02-29 PROCEDURE — 80048 BASIC METABOLIC PNL TOTAL CA: CPT

## 2024-02-29 PROCEDURE — 6360000002 HC RX W HCPCS: Performed by: SURGERY

## 2024-02-29 PROCEDURE — 1200000000 HC SEMI PRIVATE

## 2024-02-29 RX ORDER — PANTOPRAZOLE SODIUM 40 MG/10ML
40 INJECTION, POWDER, LYOPHILIZED, FOR SOLUTION INTRAVENOUS DAILY
Status: DISCONTINUED | OUTPATIENT
Start: 2024-02-29 | End: 2024-03-02

## 2024-02-29 RX ORDER — HYDRALAZINE HYDROCHLORIDE 20 MG/ML
10 INJECTION INTRAMUSCULAR; INTRAVENOUS EVERY 6 HOURS PRN
Status: DISCONTINUED | OUTPATIENT
Start: 2024-02-29 | End: 2024-03-03 | Stop reason: HOSPADM

## 2024-02-29 RX ADMIN — DIAZEPAM 5 MG: 5 INJECTION, SOLUTION INTRAMUSCULAR; INTRAVENOUS at 01:54

## 2024-02-29 RX ADMIN — DIAZEPAM 5 MG: 5 INJECTION, SOLUTION INTRAMUSCULAR; INTRAVENOUS at 08:21

## 2024-02-29 RX ADMIN — PANTOPRAZOLE SODIUM 40 MG: 40 INJECTION, POWDER, FOR SOLUTION INTRAVENOUS at 12:32

## 2024-02-29 RX ADMIN — HYDROMORPHONE HYDROCHLORIDE 1 MG: 1 INJECTION, SOLUTION INTRAMUSCULAR; INTRAVENOUS; SUBCUTANEOUS at 19:32

## 2024-02-29 RX ADMIN — HYDROMORPHONE HYDROCHLORIDE 0.5 MG: 1 INJECTION, SOLUTION INTRAMUSCULAR; INTRAVENOUS; SUBCUTANEOUS at 03:09

## 2024-02-29 RX ADMIN — HYDRALAZINE HYDROCHLORIDE 10 MG: 20 INJECTION, SOLUTION INTRAMUSCULAR; INTRAVENOUS at 12:29

## 2024-02-29 RX ADMIN — PIPERACILLIN AND TAZOBACTAM 3375 MG: 3; .375 INJECTION, POWDER, LYOPHILIZED, FOR SOLUTION INTRAVENOUS at 17:05

## 2024-02-29 RX ADMIN — NALOXEGOL OXALATE 25 MG: 25 TABLET, FILM COATED ORAL at 08:21

## 2024-02-29 RX ADMIN — PIPERACILLIN AND TAZOBACTAM 3375 MG: 3; .375 INJECTION, POWDER, LYOPHILIZED, FOR SOLUTION INTRAVENOUS at 08:27

## 2024-02-29 RX ADMIN — DIAZEPAM 5 MG: 5 INJECTION, SOLUTION INTRAMUSCULAR; INTRAVENOUS at 23:19

## 2024-02-29 RX ADMIN — DIAZEPAM 5 MG: 5 INJECTION, SOLUTION INTRAMUSCULAR; INTRAVENOUS at 06:38

## 2024-02-29 RX ADMIN — HYDROMORPHONE HYDROCHLORIDE 1 MG: 1 INJECTION, SOLUTION INTRAMUSCULAR; INTRAVENOUS; SUBCUTANEOUS at 14:30

## 2024-02-29 RX ADMIN — HYDROMORPHONE HYDROCHLORIDE 0.5 MG: 1 INJECTION, SOLUTION INTRAMUSCULAR; INTRAVENOUS; SUBCUTANEOUS at 09:44

## 2024-02-29 RX ADMIN — ENOXAPARIN SODIUM 30 MG: 100 INJECTION SUBCUTANEOUS at 08:21

## 2024-02-29 RX ADMIN — DIAZEPAM 5 MG: 5 INJECTION, SOLUTION INTRAMUSCULAR; INTRAVENOUS at 20:12

## 2024-02-29 RX ADMIN — DIAZEPAM 5 MG: 5 INJECTION, SOLUTION INTRAMUSCULAR; INTRAVENOUS at 17:00

## 2024-02-29 RX ADMIN — HYDROMORPHONE HYDROCHLORIDE 1 MG: 1 INJECTION, SOLUTION INTRAMUSCULAR; INTRAVENOUS; SUBCUTANEOUS at 23:20

## 2024-02-29 RX ADMIN — PIPERACILLIN AND TAZOBACTAM 3375 MG: 3; .375 INJECTION, POWDER, LYOPHILIZED, FOR SOLUTION INTRAVENOUS at 01:55

## 2024-02-29 RX ADMIN — DIAZEPAM 5 MG: 5 INJECTION, SOLUTION INTRAMUSCULAR; INTRAVENOUS at 12:23

## 2024-02-29 RX ADMIN — ENOXAPARIN SODIUM 30 MG: 100 INJECTION SUBCUTANEOUS at 20:12

## 2024-02-29 RX ADMIN — ONDANSETRON 4 MG: 2 INJECTION INTRAMUSCULAR; INTRAVENOUS at 11:13

## 2024-02-29 ASSESSMENT — PAIN DESCRIPTION - DESCRIPTORS
DESCRIPTORS: DULL
DESCRIPTORS: ACHING;CRAMPING
DESCRIPTORS: ACHING

## 2024-02-29 ASSESSMENT — PAIN - FUNCTIONAL ASSESSMENT: PAIN_FUNCTIONAL_ASSESSMENT: PREVENTS OR INTERFERES SOME ACTIVE ACTIVITIES AND ADLS

## 2024-02-29 ASSESSMENT — PAIN SCALES - GENERAL
PAINLEVEL_OUTOF10: 5
PAINLEVEL_OUTOF10: 7
PAINLEVEL_OUTOF10: 8
PAINLEVEL_OUTOF10: 7
PAINLEVEL_OUTOF10: 8
PAINLEVEL_OUTOF10: 8
PAINLEVEL_OUTOF10: 9
PAINLEVEL_OUTOF10: 8
PAINLEVEL_OUTOF10: 8

## 2024-02-29 ASSESSMENT — PAIN DESCRIPTION - LOCATION
LOCATION: ABDOMEN

## 2024-02-29 ASSESSMENT — PAIN DESCRIPTION - ORIENTATION
ORIENTATION: UPPER
ORIENTATION: UPPER

## 2024-02-29 ASSESSMENT — PAIN SCALES - WONG BAKER
WONGBAKER_NUMERICALRESPONSE: 8
WONGBAKER_NUMERICALRESPONSE: HURTS WHOLE LOT

## 2024-02-29 NOTE — PROGRESS NOTES
[facility] Surgery - Madai Vitale, APRN - CNP  On behalf of Dr. Caroline Covington  Postoperative Progress Note  Pt Name: Noe Moctezuma  Medical Record Number: [id number]  Date of Birth 1975   Today's Date: 2/29/2024  ASSESSMENT   POD # 1 Robotic left sigmoid colon resection converted to open   Postoperative pain   + belching  Blood tinged urine in kim as expected   Hypertensive    Surgical pathology pending    has a past medical history of Allergic rhinitis, Arthritis, Cancer (HCC), Colitis, Diverticulitis, and Hyperlipidemia.  PLANS   Analgesics and antiemetics as needed adjusted frequency and dose   IV hydration  NPO okay for ice chips  Increase activity as tolerated, C&DB, IS   Lovenox and SCD for DVT prophylaxis  GI prophylaxis  Continue kim until has bowel function    Hydralazine for HTN will monitor   Discharge planning 5-7 days pending progress  SUBJECTIVE   Noe is having pain as expected. He is concerned about blood pressure and blood tinged urine.  He denies nausea or vomiting, has not passed flatus and had a bowel movement. He is tolerating a Diet NPO. His pain is somewhat improving with current medications.  We will adjust his pain medication for better control.  He has been out of bed. Encouraged ambulation.  Surgical dressing is dry, no breakthrough drainage.  Steri strips on port sites. We will monitor HTN, remove kim when he is having bowel function.  Answered all questions.   CURRENT MEDICATIONS   Scheduled Meds:   pantoprazole  40 mg IntraVENous Daily    sodium chloride flush  5-40 mL IntraVENous 2 times per day    enoxaparin  30 mg SubCUTAneous BID    naloxegol  25 mg Oral Daily    piperacillin-tazobactam  3,375 mg IntraVENous Q8H    diazePAM  5 mg IntraVENous Q4H     Continuous Infusions:   sodium chloride      sodium chloride 125 mL/hr at 02/28/24 1556     PRN Meds:.hydrALAZINE, HYDROmorphone **OR** HYDROmorphone, sodium chloride flush, sodium chloride,

## 2024-02-29 NOTE — CARE COORDINATION
Case Management Assessment  Initial Evaluation    Date/Time of Evaluation: 2/29/2024 10:59 AM  Assessment Completed by: Angie Peguero RN    If patient is discharged prior to next notation, then this note serves as note for discharge by case management.    Patient Name: Noe Moctezuma                   YOB: 1975  Diagnosis: Malignant neoplasm of colon, unspecified part of colon (HCC) [C18.9]  Colon cancer (HCC) [C18.9]                   Date / Time: 2/28/2024  5:36 AM  Location: [address] [place] [address]     Patient Admission Status: Inpatient   Readmission Risk Low 0-14, Mod 15-19), High > 20: Readmission Risk Score: 4.8    Current PCP: Iliana Mejia MD  PCP verified by CM? Yes    Chart Reviewed: Yes      History Provided by: Patient  Patient Orientation: Alert and Oriented    Patient Cognition: Alert    Hospitalization in the last 30 days (Readmission):  No    If yes, Readmission Assessment in CM Navigator will be completed.    Advance Directives:      Code Status: Full Code   Patient's Primary Decision Maker is: Patient Declined (Legal Next of Kin Remains as Decision Maker)      Discharge Planning:    Patient lives with: Spouse/Significant Other Type of Home: House  Primary Care Giver: Self  Patient Support Systems include: Spouse/Significant Other   Current Financial resources: Other (Comment) (BCBS)  Current community resources: None  Current services prior to admission: None            Current DME:              Type of Home Care services:  None    ADLS  Prior functional level: Independent in ADLs/IADLs  Current functional level: Independent in ADLs/IADLs    Family can provide assistance at DC: Yes  Would you like Case Management to discuss the discharge plan with any other family members/significant others, and if so, who? Yes (wife at bedside)  Plans to Return to Present Housing: Yes  Other Identified Issues/Barriers to RETURNING to current housing: n/a  Potential Assistance needed at discharge: N/A

## 2024-03-01 LAB
BASOPHILS ABSOLUTE: 0 THOU/MM3 (ref 0–0.1)
BASOPHILS NFR BLD AUTO: 0.4 %
DEPRECATED RDW RBC AUTO: 40.3 FL (ref 35–45)
EOSINOPHIL NFR BLD AUTO: 0.6 %
EOSINOPHILS ABSOLUTE: 0.1 THOU/MM3 (ref 0–0.4)
ERYTHROCYTE [DISTWIDTH] IN BLOOD BY AUTOMATED COUNT: 12.9 % (ref 11.5–14.5)
HCT VFR BLD AUTO: 42.9 % (ref 42–52)
HGB BLD-MCNC: 14.5 GM/DL (ref 14–18)
IMM GRANULOCYTES # BLD AUTO: 0.04 THOU/MM3 (ref 0–0.07)
IMM GRANULOCYTES NFR BLD AUTO: 0.4 %
LYMPHOCYTES ABSOLUTE: 1.8 THOU/MM3 (ref 1–4.8)
LYMPHOCYTES NFR BLD AUTO: 17.1 %
MCH RBC QN AUTO: 29.2 PG (ref 26–33)
MCHC RBC AUTO-ENTMCNC: 33.8 GM/DL (ref 32.2–35.5)
MCV RBC AUTO: 86.5 FL (ref 80–94)
MONOCYTES ABSOLUTE: 1.6 THOU/MM3 (ref 0.4–1.3)
MONOCYTES NFR BLD AUTO: 15.6 %
NEUTROPHILS NFR BLD AUTO: 65.9 %
NRBC BLD AUTO-RTO: 0 /100 WBC
PLATELET # BLD AUTO: 203 THOU/MM3 (ref 130–400)
PMV BLD AUTO: 11.6 FL (ref 9.4–12.4)
RBC # BLD AUTO: 4.96 MILL/MM3 (ref 4.7–6.1)
REASON FOR REJECTION: NORMAL
REJECTED TEST: NORMAL
SEGMENTED NEUTROPHILS ABSOLUTE COUNT: 6.9 THOU/MM3 (ref 1.8–7.7)
WBC # BLD AUTO: 10.5 THOU/MM3 (ref 4.8–10.8)

## 2024-03-01 PROCEDURE — 6360000002 HC RX W HCPCS: Performed by: SURGERY

## 2024-03-01 PROCEDURE — 99024 POSTOP FOLLOW-UP VISIT: CPT | Performed by: NURSE PRACTITIONER

## 2024-03-01 PROCEDURE — 6370000000 HC RX 637 (ALT 250 FOR IP): Performed by: SURGERY

## 2024-03-01 PROCEDURE — 6360000002 HC RX W HCPCS: Performed by: NURSE PRACTITIONER

## 2024-03-01 PROCEDURE — 85025 COMPLETE CBC W/AUTO DIFF WBC: CPT

## 2024-03-01 PROCEDURE — C9113 INJ PANTOPRAZOLE SODIUM, VIA: HCPCS | Performed by: NURSE PRACTITIONER

## 2024-03-01 PROCEDURE — 36415 COLL VENOUS BLD VENIPUNCTURE: CPT

## 2024-03-01 PROCEDURE — 1200000000 HC SEMI PRIVATE

## 2024-03-01 PROCEDURE — APPSS30 APP SPLIT SHARED TIME 16-30 MINUTES: Performed by: NURSE PRACTITIONER

## 2024-03-01 RX ADMIN — DIAZEPAM 5 MG: 5 INJECTION, SOLUTION INTRAMUSCULAR; INTRAVENOUS at 04:00

## 2024-03-01 RX ADMIN — HYDROMORPHONE HYDROCHLORIDE 0.5 MG: 1 INJECTION, SOLUTION INTRAMUSCULAR; INTRAVENOUS; SUBCUTANEOUS at 03:41

## 2024-03-01 RX ADMIN — DIAZEPAM 5 MG: 5 INJECTION, SOLUTION INTRAMUSCULAR; INTRAVENOUS at 23:42

## 2024-03-01 RX ADMIN — NALOXEGOL OXALATE 25 MG: 25 TABLET, FILM COATED ORAL at 08:24

## 2024-03-01 RX ADMIN — ENOXAPARIN SODIUM 30 MG: 100 INJECTION SUBCUTANEOUS at 08:23

## 2024-03-01 RX ADMIN — DIAZEPAM 5 MG: 5 INJECTION, SOLUTION INTRAMUSCULAR; INTRAVENOUS at 08:23

## 2024-03-01 RX ADMIN — ENOXAPARIN SODIUM 30 MG: 100 INJECTION SUBCUTANEOUS at 20:21

## 2024-03-01 RX ADMIN — HYDROMORPHONE HYDROCHLORIDE 1 MG: 1 INJECTION, SOLUTION INTRAMUSCULAR; INTRAVENOUS; SUBCUTANEOUS at 11:02

## 2024-03-01 RX ADMIN — HYDROMORPHONE HYDROCHLORIDE 1 MG: 1 INJECTION, SOLUTION INTRAMUSCULAR; INTRAVENOUS; SUBCUTANEOUS at 20:21

## 2024-03-01 RX ADMIN — HYDROMORPHONE HYDROCHLORIDE 1 MG: 1 INJECTION, SOLUTION INTRAMUSCULAR; INTRAVENOUS; SUBCUTANEOUS at 13:57

## 2024-03-01 RX ADMIN — PANTOPRAZOLE SODIUM 40 MG: 40 INJECTION, POWDER, FOR SOLUTION INTRAVENOUS at 08:24

## 2024-03-01 ASSESSMENT — PAIN - FUNCTIONAL ASSESSMENT
PAIN_FUNCTIONAL_ASSESSMENT: ACTIVITIES ARE NOT PREVENTED

## 2024-03-01 ASSESSMENT — PAIN DESCRIPTION - ORIENTATION
ORIENTATION: MID;LOWER
ORIENTATION: MID
ORIENTATION: MID;LOWER

## 2024-03-01 ASSESSMENT — PAIN DESCRIPTION - ONSET
ONSET: ON-GOING
ONSET: GRADUAL

## 2024-03-01 ASSESSMENT — PAIN DESCRIPTION - LOCATION
LOCATION: ABDOMEN

## 2024-03-01 ASSESSMENT — PAIN DESCRIPTION - FREQUENCY
FREQUENCY: CONTINUOUS
FREQUENCY: CONTINUOUS

## 2024-03-01 ASSESSMENT — PAIN DESCRIPTION - DESCRIPTORS
DESCRIPTORS: NAGGING
DESCRIPTORS: CRAMPING
DESCRIPTORS: CRAMPING

## 2024-03-01 ASSESSMENT — PAIN SCALES - GENERAL
PAINLEVEL_OUTOF10: 5
PAINLEVEL_OUTOF10: 4
PAINLEVEL_OUTOF10: 5
PAINLEVEL_OUTOF10: 7

## 2024-03-01 ASSESSMENT — PAIN DESCRIPTION - PAIN TYPE
TYPE: SURGICAL PAIN
TYPE: SURGICAL PAIN

## 2024-03-01 NOTE — CARE COORDINATION
3/1/24, 2:12 PM EST    DISCHARGE ON GOING EVALUATION    [facility] day: 2  Location: -26/026-A Reason for admit: Malignant neoplasm of colon, unspecified part of colon (HCC) [C18.9]  Colon cancer (HCC) [C18.9]   Procedure:   2/28 OR with Dr. Covington and Dr. Ng: Robotic Left Sigmoid Colon Resection attempted, converted to open procedure CYSTOSCOPY URETERAL STENT INSERTION, kim placement     Barriers to Discharge: POD #2. Started on clear liquid diet today. Plan to remove kim. Surgical Pathology still pending. Tmax 99.2. NSR. On room air. Gen Surgery and Urology following. Abdominal binder, wound care, kim, SCDs. IVF, IV valium Q4H, lovenox, prn IV hydralazine, prn IV dilaudid, movantik, IV protonix, Electrolyte replacement protocols.     PCP: Iliana Mejia MD  Readmission Risk Score: 5.1%  Patient Goals/Plan/Treatment Preferences: Home with wife. Denies needs, declines HH.

## 2024-03-01 NOTE — PROGRESS NOTES
I have independently performed an evaluation on Noe . I have reviewed the above documentation completed by the NP, Madai Vitale  Italicized font, if present, represents changes to the note made by me.    Time spent with patient 15minutes.  Time could have been discontiguous.  Time does not include procedures.  Time does include my direct assessment of the patient and coordination of care.  Time represents more than 50% of the time involved with patient care by the surgical/tauma team.        Paitent without bowel fucntion, kim continues to get pulled on and cauging bleeding will get d/c today. Patient having grumblings to suggest bowel fuction about to return, will start clears with instruction to go slowly. Reviewed pathology with patient and family. Advance diet slowly . Continue ambulation .     Electronically signed by Caroline Covington MD on 3/1/2024 at 2:16 PM    [facility] Surgery - Madai Vitale, APRN - CNP  On behalf of Dr. 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    Stage pT2 N0.    Margins negative for carcinoma.    Twenty one lymph nodes, negative for malignancy (0/21).    has a past medical history of Allergic rhinitis, Arthritis, Cancer (HCC), Colitis, Diverticulitis, and Hyperlipidemia.  PLANS   Analgesics and antiemetics as needed adjusted frequency and dose   IV hydration  Start clear liquids   Increase activity as tolerated, C&DB, IS   Lovenox and SCD for DVT prophylaxis  GI

## 2024-03-01 NOTE — PROGRESS NOTES
Chief Complains:    Patient Active Problem List   Diagnosis    Allergic rhinitis    Diverticulosis of colon    Intermediate coronary syndrome (HCC)    Ulcerative proctitis with rectal bleeding (HCC)    Diverticulitis of large intestine    Hematochezia    Irritable bowel syndrome    Left lower quadrant pain    Current moderate episode of major depressive disorder without prior episode (HCC)    Hypertension    Colon cancer (HCC)    S/P partial resection of colon       Feeling somewhat better but still with significant pain.  Urine blood tinged.  Ureteral catheters have been removed.    Vitals:    02/29/24 0830 02/29/24 1229 02/29/24 1500 02/29/24 1933   BP: (!) 172/91 (!) 163/94 137/84 (!) 146/94   Pulse: 71 75 91 82   Resp: 18 18 18 18   Temp: 98.1 °F (36.7 °C)      TempSrc: Oral      SpO2: 98% 99% 97% 98%   Weight:       Height:             Intake/Output Summary (Last 24 hours) at 2/29/2024 2048  Last data filed at 2/29/2024 1400  Gross per 24 hour   Intake 0 ml   Output 700 ml   Net -700 ml       Labs  Recent Labs     02/28/24  0618 02/29/24  0852   WBC 7.5  --    HGB 17.2  --    HCT 49.0  --      --     136   K 3.9 4.5    102   CO2 23 21*   BUN 10 9   CREATININE 1.1 0.8   CALCIUM 9.7 9.2        Exam  General: awake, alert and NAD.  Abdomen: soft and non-tender  Ext: calves soft and non-tender  : kim in place and urine with blood tinge    Interim progress notes reviewed      Assessment and Plan  Diagnosis: post colectomy.  Ureteral catheters place pre-operatively but have been removed.  Urine with some continuing blood tinge but appears to be clearing.    Catheters out.  Kim per primary team.    Please call with any further questions.    Thank you for the opportunity of allowing us to participate in Noe's care.    Jos Ng MD, FACS  2/29/2024  8:51 PM  [phone]

## 2024-03-02 LAB — GLUCOSE BLD STRIP.AUTO-MCNC: 96 MG/DL (ref 70–108)

## 2024-03-02 PROCEDURE — C9113 INJ PANTOPRAZOLE SODIUM, VIA: HCPCS | Performed by: NURSE PRACTITIONER

## 2024-03-02 PROCEDURE — 6360000002 HC RX W HCPCS: Performed by: NURSE PRACTITIONER

## 2024-03-02 PROCEDURE — 6360000002 HC RX W HCPCS: Performed by: SURGERY

## 2024-03-02 PROCEDURE — 2580000003 HC RX 258: Performed by: NURSE PRACTITIONER

## 2024-03-02 PROCEDURE — 82948 REAGENT STRIP/BLOOD GLUCOSE: CPT

## 2024-03-02 PROCEDURE — 2580000003 HC RX 258: Performed by: SURGERY

## 2024-03-02 PROCEDURE — 1200000000 HC SEMI PRIVATE

## 2024-03-02 PROCEDURE — 6370000000 HC RX 637 (ALT 250 FOR IP): Performed by: SURGERY

## 2024-03-02 RX ORDER — OXYCODONE HYDROCHLORIDE AND ACETAMINOPHEN 5; 325 MG/1; MG/1
1 TABLET ORAL EVERY 4 HOURS PRN
Status: DISCONTINUED | OUTPATIENT
Start: 2024-03-02 | End: 2024-03-03 | Stop reason: HOSPADM

## 2024-03-02 RX ORDER — DOCUSATE SODIUM 100 MG/1
100 CAPSULE, LIQUID FILLED ORAL DAILY
Status: DISCONTINUED | OUTPATIENT
Start: 2024-03-02 | End: 2024-03-03 | Stop reason: HOSPADM

## 2024-03-02 RX ORDER — IBUPROFEN 400 MG/1
400 TABLET ORAL EVERY 4 HOURS PRN
Status: DISCONTINUED | OUTPATIENT
Start: 2024-03-02 | End: 2024-03-03 | Stop reason: HOSPADM

## 2024-03-02 RX ORDER — KETOROLAC TROMETHAMINE 10 MG/1
10 TABLET, FILM COATED ORAL EVERY 4 HOURS PRN
Status: DISCONTINUED | OUTPATIENT
Start: 2024-03-02 | End: 2024-03-02 | Stop reason: CLARIF

## 2024-03-02 RX ADMIN — HYDROMORPHONE HYDROCHLORIDE 1 MG: 1 INJECTION, SOLUTION INTRAMUSCULAR; INTRAVENOUS; SUBCUTANEOUS at 09:05

## 2024-03-02 RX ADMIN — SODIUM CHLORIDE, PRESERVATIVE FREE 10 ML: 5 INJECTION INTRAVENOUS at 20:42

## 2024-03-02 RX ADMIN — SODIUM CHLORIDE, PRESERVATIVE FREE 10 ML: 5 INJECTION INTRAVENOUS at 09:06

## 2024-03-02 RX ADMIN — ENOXAPARIN SODIUM 30 MG: 100 INJECTION SUBCUTANEOUS at 20:42

## 2024-03-02 RX ADMIN — DIAZEPAM 5 MG: 5 INJECTION, SOLUTION INTRAMUSCULAR; INTRAVENOUS at 09:05

## 2024-03-02 RX ADMIN — IBUPROFEN 400 MG: 400 TABLET, FILM COATED ORAL at 15:21

## 2024-03-02 RX ADMIN — HYDROMORPHONE HYDROCHLORIDE 0.5 MG: 1 INJECTION, SOLUTION INTRAMUSCULAR; INTRAVENOUS; SUBCUTANEOUS at 02:56

## 2024-03-02 RX ADMIN — OXYCODONE HYDROCHLORIDE AND ACETAMINOPHEN 1 TABLET: 5; 325 TABLET ORAL at 15:21

## 2024-03-02 RX ADMIN — DIAZEPAM 5 MG: 5 INJECTION, SOLUTION INTRAMUSCULAR; INTRAVENOUS at 05:16

## 2024-03-02 RX ADMIN — SODIUM CHLORIDE: 9 INJECTION, SOLUTION INTRAVENOUS at 06:18

## 2024-03-02 RX ADMIN — DIAZEPAM 5 MG: 5 INJECTION, SOLUTION INTRAMUSCULAR; INTRAVENOUS at 20:42

## 2024-03-02 RX ADMIN — NALOXEGOL OXALATE 25 MG: 25 TABLET, FILM COATED ORAL at 09:05

## 2024-03-02 RX ADMIN — PANTOPRAZOLE SODIUM 40 MG: 40 INJECTION, POWDER, FOR SOLUTION INTRAVENOUS at 09:05

## 2024-03-02 RX ADMIN — ENOXAPARIN SODIUM 30 MG: 100 INJECTION SUBCUTANEOUS at 09:05

## 2024-03-02 ASSESSMENT — PAIN DESCRIPTION - DESCRIPTORS: DESCRIPTORS: CRAMPING

## 2024-03-02 ASSESSMENT — PAIN DESCRIPTION - FREQUENCY: FREQUENCY: CONTINUOUS

## 2024-03-02 ASSESSMENT — PAIN DESCRIPTION - ORIENTATION: ORIENTATION: MID;LOWER

## 2024-03-02 ASSESSMENT — PAIN SCALES - GENERAL
PAINLEVEL_OUTOF10: 6
PAINLEVEL_OUTOF10: 4
PAINLEVEL_OUTOF10: 0
PAINLEVEL_OUTOF10: 4

## 2024-03-02 ASSESSMENT — PAIN DESCRIPTION - PAIN TYPE: TYPE: SURGICAL PAIN

## 2024-03-02 ASSESSMENT — PAIN - FUNCTIONAL ASSESSMENT: PAIN_FUNCTIONAL_ASSESSMENT: ACTIVITIES ARE NOT PREVENTED

## 2024-03-02 ASSESSMENT — PAIN DESCRIPTION - LOCATION: LOCATION: ABDOMEN

## 2024-03-02 ASSESSMENT — PAIN DESCRIPTION - ONSET: ONSET: ON-GOING

## 2024-03-02 NOTE — PLAN OF CARE
Problem: Discharge Planning  Goal: Discharge to home or other facility with appropriate resources  Outcome: Progressing  Flowsheets (Taken 3/1/2024 2006)  Discharge to home or other facility with appropriate resources:   Identify barriers to discharge with patient and caregiver   Arrange for needed discharge resources and transportation as appropriate   Identify discharge learning needs (meds, wound care, etc)     Problem: Pain  Goal: Verbalizes/displays adequate comfort level or baseline comfort level  Outcome: Progressing  Flowsheets (Taken 3/1/2024 2006)  Verbalizes/displays adequate comfort level or baseline comfort level:   Encourage patient to monitor pain and request assistance   Assess pain using appropriate pain scale   Administer analgesics based on type and severity of pain and evaluate response   Implement non-pharmacological measures as appropriate and evaluate response     Problem: Safety - Adult  Goal: Free from fall injury  Outcome: Progressing  Flowsheets (Taken 3/1/2024 2132)  Free From Fall Injury: Instruct family/caregiver on patient safety     Problem: Skin/Tissue Integrity  Goal: Absence of new skin breakdown  Description: 1.  Monitor for areas of redness and/or skin breakdown  2.  Assess vascular access sites hourly  3.  Every 4-6 hours minimum:  Change oxygen saturation probe site  4.  Every 4-6 hours:  If on nasal continuous positive airway pressure, respiratory therapy assess nares and determine need for appliance change or resting period.  Outcome: Progressing    Care plan reviewed with patient. Patient verbalizes understanding of the plan of care and contributes to goal setting.

## 2024-03-02 NOTE — PROGRESS NOTES
[facility] Surgery - Madai Vitale, APRN - CNP  On behalf of Dr. 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    Stage pT2 N0.    Margins negative for carcinoma.    Twenty one lymph nodes, negative for malignancy (0/21).    has a past medical history of Allergic rhinitis, Arthritis, Cancer (HCC), Colitis, Diverticulitis, and Hyperlipidemia.  PLANS   Analgesics and antiemetics as needed adjusted frequency and dose   IV hydration  Continue clear liquids until bowel function  Increase activity as tolerated, C&DB, IS   Lovenox and SCD for DVT prophylaxis  GI prophylaxis  Removed kim  Regular diet today  Possible d/c tomorrow  Docusate and oral pain meds today   SUBJECTIVE   Noe is having pain as expected. He states the pain is better and he rested yesterday. He had a bowel movement today He is tolerating a ADULT DIET; Clear Liquid. Advance to clear liquids today.   He has been out of bed and ambulating.  Surgical removed, no breakthrough drainage.  Steri strips on port sites.  HTN is improving, remove kim.  Answered all questions. Pathology was not back at the time of rounds.   CURRENT MEDICATIONS   Scheduled Meds:   pantoprazole  40 mg IntraVENous Daily    sodium chloride flush  5-40 mL IntraVENous 2 times per day    enoxaparin  30 mg SubCUTAneous BID    naloxegol  25 mg Oral Daily    diazePAM  5 mg IntraVENous Q4H     Continuous Infusions:   sodium chloride      sodium chloride 75 mL/hr at 03/01/24 1517     PRN Meds:.hydrALAZINE,

## 2024-03-02 NOTE — PROGRESS NOTES
Pt was with family at the time of the visit. He was dealing with colon cancer. He was surrounded by his loving family. He was blessed.    03/01/24 1909   Encounter Summary   Encounter Overview/Reason  Initial Encounter   Service Provided For: Patient and family together   Referral/Consult From: [facility]   Support System Family members   Last Encounter  03/01/24   Complexity of Encounter Low   Begin Time 1710   End Time  1715   Total Time Calculated 5 min   Spiritual/Emotional needs   Type Spiritual Support   Assessment/Intervention/Outcome   Assessment Hopeful   Intervention Empowerment

## 2024-03-03 VITALS
HEIGHT: 72 IN | HEART RATE: 73 BPM | WEIGHT: 239.3 LBS | DIASTOLIC BLOOD PRESSURE: 84 MMHG | TEMPERATURE: 98 F | RESPIRATION RATE: 18 BRPM | OXYGEN SATURATION: 92 % | BODY MASS INDEX: 32.41 KG/M2 | SYSTOLIC BLOOD PRESSURE: 135 MMHG

## 2024-03-03 LAB
ANION GAP SERPL CALC-SCNC: 10 MEQ/L (ref 8–16)
BUN SERPL-MCNC: 9 MG/DL (ref 7–22)
CALCIUM SERPL-MCNC: 8.9 MG/DL (ref 8.5–10.5)
CHLORIDE SERPL-SCNC: 101 MEQ/L (ref 98–111)
CO2 SERPL-SCNC: 26 MEQ/L (ref 23–33)
CREAT SERPL-MCNC: 0.8 MG/DL (ref 0.4–1.2)
DEPRECATED RDW RBC AUTO: 38.5 FL (ref 35–45)
ERYTHROCYTE [DISTWIDTH] IN BLOOD BY AUTOMATED COUNT: 12.5 % (ref 11.5–14.5)
GFR SERPL CREATININE-BSD FRML MDRD: > 60 ML/MIN/1.73M2
GLUCOSE SERPL-MCNC: 106 MG/DL (ref 70–108)
HCT VFR BLD AUTO: 42.6 % (ref 42–52)
HGB BLD-MCNC: 14.7 GM/DL (ref 14–18)
MCH RBC QN AUTO: 29.2 PG (ref 26–33)
MCHC RBC AUTO-ENTMCNC: 34.5 GM/DL (ref 32.2–35.5)
MCV RBC AUTO: 84.5 FL (ref 80–94)
PLATELET # BLD AUTO: 226 THOU/MM3 (ref 130–400)
PMV BLD AUTO: 11.5 FL (ref 9.4–12.4)
POTASSIUM SERPL-SCNC: 3.2 MEQ/L (ref 3.5–5.2)
RBC # BLD AUTO: 5.04 MILL/MM3 (ref 4.7–6.1)
SODIUM SERPL-SCNC: 137 MEQ/L (ref 135–145)
WBC # BLD AUTO: 8.9 THOU/MM3 (ref 4.8–10.8)

## 2024-03-03 PROCEDURE — 6370000000 HC RX 637 (ALT 250 FOR IP): Performed by: SURGERY

## 2024-03-03 PROCEDURE — APPSS30 APP SPLIT SHARED TIME 16-30 MINUTES: Performed by: NURSE PRACTITIONER

## 2024-03-03 PROCEDURE — 85027 COMPLETE CBC AUTOMATED: CPT

## 2024-03-03 PROCEDURE — 2580000003 HC RX 258: Performed by: SURGERY

## 2024-03-03 PROCEDURE — 6370000000 HC RX 637 (ALT 250 FOR IP): Performed by: NURSE PRACTITIONER

## 2024-03-03 PROCEDURE — 80048 BASIC METABOLIC PNL TOTAL CA: CPT

## 2024-03-03 PROCEDURE — 6360000002 HC RX W HCPCS: Performed by: SURGERY

## 2024-03-03 PROCEDURE — 99024 POSTOP FOLLOW-UP VISIT: CPT | Performed by: NURSE PRACTITIONER

## 2024-03-03 PROCEDURE — 36415 COLL VENOUS BLD VENIPUNCTURE: CPT

## 2024-03-03 RX ORDER — POTASSIUM CHLORIDE 20 MEQ/1
10 TABLET, EXTENDED RELEASE ORAL PRN
Status: DISCONTINUED | OUTPATIENT
Start: 2024-03-03 | End: 2024-03-03 | Stop reason: SDUPTHER

## 2024-03-03 RX ORDER — OXYCODONE HYDROCHLORIDE AND ACETAMINOPHEN 5; 325 MG/1; MG/1
1 TABLET ORAL EVERY 6 HOURS PRN
Qty: 24 TABLET | Refills: 0 | Status: ON HOLD | OUTPATIENT
Start: 2024-03-03 | End: 2024-03-10

## 2024-03-03 RX ORDER — POTASSIUM CHLORIDE 20 MEQ/1
40 TABLET, EXTENDED RELEASE ORAL PRN
Status: DISCONTINUED | OUTPATIENT
Start: 2024-03-03 | End: 2024-03-03 | Stop reason: HOSPADM

## 2024-03-03 RX ORDER — POTASSIUM CHLORIDE 7.45 MG/ML
10 INJECTION INTRAVENOUS PRN
Status: DISCONTINUED | OUTPATIENT
Start: 2024-03-03 | End: 2024-03-03 | Stop reason: HOSPADM

## 2024-03-03 RX ADMIN — POTASSIUM CHLORIDE 40 MEQ: 1500 TABLET, EXTENDED RELEASE ORAL at 10:34

## 2024-03-03 RX ADMIN — OXYCODONE HYDROCHLORIDE AND ACETAMINOPHEN 1 TABLET: 5; 325 TABLET ORAL at 12:30

## 2024-03-03 RX ADMIN — ENOXAPARIN SODIUM 30 MG: 100 INJECTION SUBCUTANEOUS at 08:40

## 2024-03-03 RX ADMIN — DOCUSATE SODIUM 100 MG: 100 CAPSULE, LIQUID FILLED ORAL at 08:40

## 2024-03-03 RX ADMIN — DIAZEPAM 5 MG: 5 INJECTION, SOLUTION INTRAMUSCULAR; INTRAVENOUS at 08:40

## 2024-03-03 RX ADMIN — NALOXEGOL OXALATE 25 MG: 25 TABLET, FILM COATED ORAL at 08:41

## 2024-03-03 RX ADMIN — DIAZEPAM 5 MG: 5 INJECTION, SOLUTION INTRAMUSCULAR; INTRAVENOUS at 01:45

## 2024-03-03 RX ADMIN — ONDANSETRON 4 MG: 4 TABLET, ORALLY DISINTEGRATING ORAL at 01:42

## 2024-03-03 RX ADMIN — SODIUM CHLORIDE, PRESERVATIVE FREE 10 ML: 5 INJECTION INTRAVENOUS at 08:40

## 2024-03-03 ASSESSMENT — PAIN SCALES - WONG BAKER
WONGBAKER_NUMERICALRESPONSE: 0
WONGBAKER_NUMERICALRESPONSE: NO HURT

## 2024-03-03 ASSESSMENT — PAIN SCALES - GENERAL
PAINLEVEL_OUTOF10: 7
PAINLEVEL_OUTOF10: 0

## 2024-03-03 NOTE — DISCHARGE SUMMARY
Discharge Summary     Patient Identification:  Noe Moctezuma  : 1975  MRN: [id number]   Account: [id number]     Admit date: 2024  Discharge date: 3/3/24   Attending provider: Caroline Covington MD        Primary care provider: Iliana Mejia MD     Discharge Diagnoses:   Principal Problem:    Colon cancer (HCC)  Active Problems:    S/P partial resection of colon  Resolved Problems:    * No resolved hospital problems. *       Hospital Course:   Noe Moctezuma is a 48 y.o. male admitted to [facility] on 2024 for adenocarcinoma of the colon needing a resection. he was taken to the operative suite per Caroline Covington MD and the planned procedure was performed as noted above.  He was admitted to  for postoperative care and was initially managed with bowel rest, analgesics for pain control, IV fluid hydration, GI and DVT prophylaxis. Over the hospital stay he readily improved in ability to tolerate increasing levels of activity and to take po fluids, solid foods with evidence of returning bowel function, and spontaneously voiding. At the time of discharge he was able to tolerate pain with oral analgesic and was medically stable.  .      Procedures:     Operative Note        Patient: Noe Moctezuma  YOB: 1975  MRN: [id number]     Date of Procedure: 2024     Pre-Op Diagnosis Codes:     * Malignant neoplasm of colon, unspecified part of colon (HCC) [C18.9]     Post-Op Diagnosis: Same with need for ureteral identification via ureteral catheters       Procedure(s):  Robotic Left Sigmoid Colon Resection, Poss Open     Surgeon(s):  Caroline Covington MD Nicholson, Craig A, MD     Assistant:   * No surgical staff found *     Anesthesia: General     Estimated Blood Loss (mL): Minimal     Complications: None     Specimens:   * No specimens in log *     Implants:  * No implants in log *      Drains: * No LDAs found *     Findings: bilateral ureteral catheters placed     move around as this helps with the circulation and speeds up the healing process.  [x]Breath deeply and cough from time to time. This helps to clear your lungs and helps prevent pneumonia.  [x]Supporting your incision with a pillow or your hand helps to minimize discomfort and pain.  [x]Laparoscopic patients may develop shoulder pain in the first 48 hours from the gas used during the procedure.    FOLLOW-UP CARE. SPECIFICALLY WATCH FOR:   Fever over 101 degrees by mouth   Increased redness, warmth, hardness at operative site.   Blood soaked dressing (small amounts of oozing may be normal.)   Increased or progressive drainage from the surgical area   Inability to urinate or blood in the urine   Pain not relieved by the medications ordered   Persistent nausea and/or vomiting, unable to retain fluids.    FOLLOW-UP APPOINTMENT:  As scheduled     Call my office if you have any problem that concerns you [phone]. After hours, you can reach the answering service via the office phone number. IF YOU NEED IMMEDIATE ATTENTION, GO TO THE EMERGENCY ROOM AND YOUR DOCTOR WILL BE CONTACTED.    Prepared By:  Madai Vitale APRN - CNP CNP  For Caroline Covington MD    Electronically signed 3/3/2024 at 11:17 AM  Diet: ADULT DIET; Regular      Follow-up visits:   Caroline Covington MD  [address] [address]  [phone]    Follow up on 3/14/2024  11:30       Discharge condition: fair  Disposition: Home  Time spent on discharge: 35 minutes     Discharge Medications:     Medication List        START taking these medications      oxyCODONE-acetaminophen 5-325 MG per tablet  Commonly known as: PERCOCET  Take 1 tablet by mouth every 6 hours as needed for Pain for up to 7 days. Max Daily Amount: 4 tablets            CONTINUE taking these medications      acetaminophen 500 MG tablet  Commonly known as: TYLENOL     ALLEGRA ALLERGY PO     mesalamine 1000 MG suppository  Commonly known as: Canasa  Place 1 suppository rectally

## 2024-03-04 ENCOUNTER — CARE COORDINATION (OUTPATIENT)
Dept: CASE MANAGEMENT | Age: 49
End: 2024-03-04

## 2024-03-04 DIAGNOSIS — Z90.49 S/P PARTIAL RESECTION OF COLON: Primary | ICD-10-CM

## 2024-03-04 NOTE — CARE COORDINATION
Care Transitions Initial Follow Up Call    Call within 2 business days of discharge: Yes    Patient Current Location:  Home: [address] [place] [address]    Care Transition Nurse contacted the patient by telephone to perform post hospital discharge assessment. Verified name and  with patient as identifiers. Provided introduction to self, and explanation of the Care Transition Nurse role.     Patient: Noe Moctezuma Patient : 1975   MRN: [id number]  Reason for Admission: s/p partial resection of colon  Discharge Date: 3/3/24 RARS: Readmission Risk Score: 4.3      Last Discharge Facility       Date Complaint Diagnosis Description Type Department Provider    24  S/P partial resection of colon ... Admission (Discharged) [facility] Caroline Covington MD            Challenges to be reviewed by the provider   Additional needs identified to be addressed with provider: No  none               Method of communication with provider: none.    Spoke with Corey, said he is feeling pretty good.  Denies fever, chills, dyspnea.  Has a little bit of abd pain from surgery.  Alternating Tylenol/Ibuprofen with Percocet.  Aware not to take more than 4000 mg of Tylenol in 24 hr period.  Said he doesn't like to take pills so that won't be a problem.  Reviewed medications, not taking his prn meds.  Appetite and fluid intake is good.  Eating easily digestible foods.  Bowels have moved.  Reviewed AVS post op instructions, voiced understanding.  Incision looks good, no drainage.  Will see surgeon 3/14, will not see PCP, planned surgery.  No other issues to report.  Denies any other needs.  No other questions or concerns at this time.  Will continue to follow.    Care Transition Nurse reviewed discharge instructions, medical action plan, and red flags with patient who verbalized understanding. The patient was given an opportunity to ask questions and does not have any further questions or concerns at this time. Were discharge

## 2024-03-04 NOTE — ADT AUTH CERT
123 Given 03/01/24 0900(s)   03/01/24 0824(a)   03/01/24 0824(r) 40 mg   IntraVENous      Jesko, Prema A.   124 Given 03/02/24 0900(s)   03/02/24 0905(a)   03/02/24 0906(r) 40 mg   IntraVENous      Jesko, Prema A.   125 Given 03/03/24 1034(s)   03/03/24 1034(a)   03/03/24 1035(r) 40 mEq   Oral      Edwards, Chonsay L   126 See Alternative 03/03/24 1034(s)   03/03/24 1034(a)   03/03/24 1035(r)     Oral      Edwards, Chonsay L   127 See Alternative 03/03/24 1034(s)   03/03/24 1034(a)   03/03/24 1035(r)     IntraVENous      Edwards, Chonsay L   128 Not Given 02/28/24 2100(s)   02/28/24 2248(a)   02/28/24 2248(r)     IntraVENous     Reason: IV Fluid Infusing Pauline Davalos   129 Not Given 02/29/24 0900(s)   02/29/24 0828(a)   02/29/24 0828(r)     IntraVENous     Reason: IV Fluid Infusing Medhat Barnett   130 Not Given 02/29/24 2100(s)   02/29/24 2014(a)   02/29/24 2015(r)     IntraVENous     Reason: IV Fluid Infusing Sarahi Mendoza   131 Not Given 03/01/24 0900(s)   03/01/24 0819(a)   03/01/24 0819(r)     IntraVENous     Reason: IV Fluid Infusing Jesko, Prema A.   132 Not Given 03/01/24 2100(s)   03/01/24 2021(a)   03/01/24 2042(r)     IntraVENous     Reason: IV Fluid Infusing Jose Benjamin   133 Given 03/02/24 0900(s)   03/02/24 0906(a)   03/02/24 0906(r) 10 mL   IntraVENous      Jesko, Prema A.   134 Given 03/02/24 2100(s)   03/02/24 2042(a)   03/02/24 2042(r) 10 mL   IntraVENous      Vilma Perera   135 Given 03/03/24 0900(s)   03/03/24 0840(a)   03/03/24 0840(r) 10 mL   IntraVENous      Dean Edwards   136 Canceled Entry 02/28/24 1145(s)   02/28/24 1347(a)   02/28/24 1347(r)     IntraVENous      Medhat Barnett     (s)=scheduled time   (a)=action time   (r)=recorded time                 Medication Administration Report   for Noe Moctezuma \"Corey\" as of 02/13/24 through 02/22/24      Legend:                                                                        Medications 02/13 02/14 02/15 02/16

## 2024-03-08 ENCOUNTER — ANESTHESIA (OUTPATIENT)
Dept: OPERATING ROOM | Age: 49
End: 2024-03-08
Payer: COMMERCIAL

## 2024-03-08 ENCOUNTER — HOSPITAL ENCOUNTER (OUTPATIENT)
Dept: GENERAL RADIOLOGY | Age: 49
Discharge: HOME | End: 2024-03-08
Attending: RADIOLOGY

## 2024-03-08 ENCOUNTER — HOSPITAL ENCOUNTER (OUTPATIENT)
Dept: CT IMAGING | Age: 49
Discharge: HOME | End: 2024-03-08
Attending: RADIOLOGY
Payer: COMMERCIAL

## 2024-03-08 ENCOUNTER — HOSPITAL ENCOUNTER (INPATIENT)
Age: 49
LOS: 10 days | Discharge: HOME | DRG: 329 | End: 2024-03-18
Attending: SURGERY
Payer: COMMERCIAL

## 2024-03-08 ENCOUNTER — ANESTHESIA EVENT (OUTPATIENT)
Dept: OPERATING ROOM | Age: 49
End: 2024-03-08
Payer: COMMERCIAL

## 2024-03-08 ENCOUNTER — TELEPHONE (OUTPATIENT)
Dept: SURGERY | Age: 49
End: 2024-03-08

## 2024-03-08 DIAGNOSIS — Z90.49 S/P PARTIAL RESECTION OF COLON: ICD-10-CM

## 2024-03-08 DIAGNOSIS — R69 DIAGNOSIS UNKNOWN: ICD-10-CM

## 2024-03-08 DIAGNOSIS — Z93.3 S/P COLOSTOMY (HCC): Primary | ICD-10-CM

## 2024-03-08 DIAGNOSIS — K91.89 ANASTOMOTIC LEAK OF INTESTINE: ICD-10-CM

## 2024-03-08 PROBLEM — Z98.890 S/P LAPAROTOMY: Status: ACTIVE | Noted: 2024-03-08

## 2024-03-08 PROBLEM — K65.8 FECAL PERITONITIS (HCC): Status: ACTIVE | Noted: 2024-03-08

## 2024-03-08 PROBLEM — R10.0 ACUTE ABDOMEN: Status: ACTIVE | Noted: 2024-03-08

## 2024-03-08 LAB
ANION GAP SERPL CALC-SCNC: 13 MEQ/L (ref 8–16)
BUN SERPL-MCNC: 15 MG/DL (ref 7–22)
CALCIUM SERPL-MCNC: 8.8 MG/DL (ref 8.5–10.5)
CHLORIDE SERPL-SCNC: 103 MEQ/L (ref 98–111)
CO2 SERPL-SCNC: 22 MEQ/L (ref 23–33)
CREAT SERPL-MCNC: 1.2 MG/DL (ref 0.4–1.2)
DEPRECATED RDW RBC AUTO: 40 FL (ref 35–45)
EKG ATRIAL RATE: 94 BPM
EKG P AXIS: 20 DEGREES
EKG P-R INTERVAL: 134 MS
EKG Q-T INTERVAL: 376 MS
EKG QRS DURATION: 150 MS
EKG QTC CALCULATION (BAZETT): 470 MS
EKG R AXIS: 27 DEGREES
EKG T AXIS: -4 DEGREES
EKG VENTRICULAR RATE: 94 BPM
ERYTHROCYTE [DISTWIDTH] IN BLOOD BY AUTOMATED COUNT: 13.2 % (ref 11.5–14.5)
GFR SERPL CREATININE-BSD FRML MDRD: > 60 ML/MIN/1.73M2
GLUCOSE SERPL-MCNC: 114 MG/DL (ref 70–108)
HCT VFR BLD AUTO: 46.4 % (ref 42–52)
HGB BLD-MCNC: 16.2 GM/DL (ref 14–18)
MCH RBC QN AUTO: 29.8 PG (ref 26–33)
MCHC RBC AUTO-ENTMCNC: 34.9 GM/DL (ref 32.2–35.5)
MCV RBC AUTO: 85.3 FL (ref 80–94)
PLATELET # BLD AUTO: 276 THOU/MM3 (ref 130–400)
PMV BLD AUTO: 11.1 FL (ref 9.4–12.4)
POTASSIUM SERPL-SCNC: 4 MEQ/L (ref 3.5–5.2)
RBC # BLD AUTO: 5.44 MILL/MM3 (ref 4.7–6.1)
SODIUM SERPL-SCNC: 138 MEQ/L (ref 135–145)
WBC # BLD AUTO: 7 THOU/MM3 (ref 4.8–10.8)

## 2024-03-08 PROCEDURE — 99223 1ST HOSP IP/OBS HIGH 75: CPT | Performed by: SURGERY

## 2024-03-08 PROCEDURE — 2500000003 HC RX 250 WO HCPCS: Performed by: NURSE ANESTHETIST, CERTIFIED REGISTERED

## 2024-03-08 PROCEDURE — 2709999900 HC NON-CHARGEABLE SUPPLY: Performed by: SURGERY

## 2024-03-08 PROCEDURE — C1729 CATH, DRAINAGE: HCPCS | Performed by: SURGERY

## 2024-03-08 PROCEDURE — 2580000003 HC RX 258: Performed by: NURSE ANESTHETIST, CERTIFIED REGISTERED

## 2024-03-08 PROCEDURE — 80048 BASIC METABOLIC PNL TOTAL CA: CPT

## 2024-03-08 PROCEDURE — APPSS45 APP SPLIT SHARED TIME 31-45 MINUTES: Performed by: NURSE PRACTITIONER

## 2024-03-08 PROCEDURE — 93010 ELECTROCARDIOGRAM REPORT: CPT | Performed by: NUCLEAR MEDICINE

## 2024-03-08 PROCEDURE — 0D1L0Z4 BYPASS TRANSVERSE COLON TO CUTANEOUS, OPEN APPROACH: ICD-10-PCS | Performed by: SURGERY

## 2024-03-08 PROCEDURE — 2580000003 HC RX 258: Performed by: NURSE PRACTITIONER

## 2024-03-08 PROCEDURE — C9113 INJ PANTOPRAZOLE SODIUM, VIA: HCPCS | Performed by: SURGERY

## 2024-03-08 PROCEDURE — 1200000000 HC SEMI PRIVATE

## 2024-03-08 PROCEDURE — 3700000000 HC ANESTHESIA ATTENDED CARE: Performed by: SURGERY

## 2024-03-08 PROCEDURE — 6360000002 HC RX W HCPCS: Performed by: SURGERY

## 2024-03-08 PROCEDURE — P9045 ALBUMIN (HUMAN), 5%, 250 ML: HCPCS | Performed by: NURSE ANESTHETIST, CERTIFIED REGISTERED

## 2024-03-08 PROCEDURE — 93005 ELECTROCARDIOGRAM TRACING: CPT | Performed by: ANESTHESIOLOGY

## 2024-03-08 PROCEDURE — 2720000010 HC SURG SUPPLY STERILE: Performed by: SURGERY

## 2024-03-08 PROCEDURE — 36415 COLL VENOUS BLD VENIPUNCTURE: CPT

## 2024-03-08 PROCEDURE — 3600000004 HC SURGERY LEVEL 4 BASE: Performed by: SURGERY

## 2024-03-08 PROCEDURE — 85027 COMPLETE CBC AUTOMATED: CPT

## 2024-03-08 PROCEDURE — 88307 TISSUE EXAM BY PATHOLOGIST: CPT

## 2024-03-08 PROCEDURE — 2580000003 HC RX 258: Performed by: SURGERY

## 2024-03-08 PROCEDURE — 2700000000 HC OXYGEN THERAPY PER DAY

## 2024-03-08 PROCEDURE — 44141 PARTIAL REMOVAL OF COLON: CPT | Performed by: SURGERY

## 2024-03-08 PROCEDURE — 7100000001 HC PACU RECOVERY - ADDTL 15 MIN: Performed by: SURGERY

## 2024-03-08 PROCEDURE — 6360000002 HC RX W HCPCS: Performed by: NURSE PRACTITIONER

## 2024-03-08 PROCEDURE — 6360000002 HC RX W HCPCS

## 2024-03-08 PROCEDURE — 7100000000 HC PACU RECOVERY - FIRST 15 MIN: Performed by: SURGERY

## 2024-03-08 PROCEDURE — 6360000002 HC RX W HCPCS: Performed by: NURSE ANESTHETIST, CERTIFIED REGISTERED

## 2024-03-08 PROCEDURE — 3600000014 HC SURGERY LEVEL 4 ADDTL 15MIN: Performed by: SURGERY

## 2024-03-08 PROCEDURE — 94761 N-INVAS EAR/PLS OXIMETRY MLT: CPT

## 2024-03-08 PROCEDURE — 3700000001 HC ADD 15 MINUTES (ANESTHESIA): Performed by: SURGERY

## 2024-03-08 PROCEDURE — 6360000002 HC RX W HCPCS: Performed by: STUDENT IN AN ORGANIZED HEALTH CARE EDUCATION/TRAINING PROGRAM

## 2024-03-08 RX ORDER — FENTANYL CITRATE 50 UG/ML
50 INJECTION, SOLUTION INTRAMUSCULAR; INTRAVENOUS EVERY 5 MIN PRN
Status: DISCONTINUED | OUTPATIENT
Start: 2024-03-08 | End: 2024-03-08 | Stop reason: HOSPADM

## 2024-03-08 RX ORDER — ONDANSETRON 2 MG/ML
INJECTION INTRAMUSCULAR; INTRAVENOUS PRN
Status: DISCONTINUED | OUTPATIENT
Start: 2024-03-08 | End: 2024-03-08 | Stop reason: SDUPTHER

## 2024-03-08 RX ORDER — SODIUM CHLORIDE 0.9 % (FLUSH) 0.9 %
5-40 SYRINGE (ML) INJECTION EVERY 12 HOURS SCHEDULED
Status: DISCONTINUED | OUTPATIENT
Start: 2024-03-08 | End: 2024-03-08 | Stop reason: HOSPADM

## 2024-03-08 RX ORDER — SODIUM CHLORIDE 9 MG/ML
INJECTION, SOLUTION INTRAVENOUS CONTINUOUS
Status: DISCONTINUED | OUTPATIENT
Start: 2024-03-08 | End: 2024-03-08

## 2024-03-08 RX ORDER — INDOCYANINE GREEN AND WATER 25 MG
KIT INJECTION PRN
Status: DISCONTINUED | OUTPATIENT
Start: 2024-03-08 | End: 2024-03-08 | Stop reason: SDUPTHER

## 2024-03-08 RX ORDER — ENOXAPARIN SODIUM 100 MG/ML
30 INJECTION SUBCUTANEOUS 2 TIMES DAILY
Status: DISCONTINUED | OUTPATIENT
Start: 2024-03-09 | End: 2024-03-18 | Stop reason: HOSPADM

## 2024-03-08 RX ORDER — SODIUM CHLORIDE 9 MG/ML
INJECTION, SOLUTION INTRAVENOUS CONTINUOUS PRN
Status: DISCONTINUED | OUTPATIENT
Start: 2024-03-08 | End: 2024-03-08 | Stop reason: SDUPTHER

## 2024-03-08 RX ORDER — SUCCINYLCHOLINE/SOD CL,ISO/PF 200MG/10ML
SYRINGE (ML) INTRAVENOUS PRN
Status: DISCONTINUED | OUTPATIENT
Start: 2024-03-08 | End: 2024-03-08 | Stop reason: SDUPTHER

## 2024-03-08 RX ORDER — ROCURONIUM BROMIDE 10 MG/ML
INJECTION, SOLUTION INTRAVENOUS PRN
Status: DISCONTINUED | OUTPATIENT
Start: 2024-03-08 | End: 2024-03-08 | Stop reason: SDUPTHER

## 2024-03-08 RX ORDER — SODIUM CHLORIDE 9 MG/ML
INJECTION, SOLUTION INTRAVENOUS CONTINUOUS
Status: DISCONTINUED | OUTPATIENT
Start: 2024-03-08 | End: 2024-03-13

## 2024-03-08 RX ORDER — PANTOPRAZOLE SODIUM 40 MG/10ML
40 INJECTION, POWDER, LYOPHILIZED, FOR SOLUTION INTRAVENOUS DAILY
Status: DISCONTINUED | OUTPATIENT
Start: 2024-03-08 | End: 2024-03-18 | Stop reason: HOSPADM

## 2024-03-08 RX ORDER — SODIUM CHLORIDE 9 MG/ML
INJECTION, SOLUTION INTRAVENOUS PRN
Status: DISCONTINUED | OUTPATIENT
Start: 2024-03-08 | End: 2024-03-08 | Stop reason: HOSPADM

## 2024-03-08 RX ORDER — ALBUMIN, HUMAN INJ 5% 5 %
SOLUTION INTRAVENOUS PRN
Status: DISCONTINUED | OUTPATIENT
Start: 2024-03-08 | End: 2024-03-08 | Stop reason: SDUPTHER

## 2024-03-08 RX ORDER — ONDANSETRON 2 MG/ML
4 INJECTION INTRAMUSCULAR; INTRAVENOUS EVERY 6 HOURS PRN
Status: DISCONTINUED | OUTPATIENT
Start: 2024-03-08 | End: 2024-03-18 | Stop reason: HOSPADM

## 2024-03-08 RX ORDER — FENTANYL CITRATE 50 UG/ML
INJECTION, SOLUTION INTRAMUSCULAR; INTRAVENOUS PRN
Status: DISCONTINUED | OUTPATIENT
Start: 2024-03-08 | End: 2024-03-08 | Stop reason: SDUPTHER

## 2024-03-08 RX ORDER — LIDOCAINE HCL/PF 100 MG/5ML
SYRINGE (ML) INJECTION PRN
Status: DISCONTINUED | OUTPATIENT
Start: 2024-03-08 | End: 2024-03-08 | Stop reason: SDUPTHER

## 2024-03-08 RX ORDER — SODIUM CHLORIDE 0.9 % (FLUSH) 0.9 %
5-40 SYRINGE (ML) INJECTION PRN
Status: DISCONTINUED | OUTPATIENT
Start: 2024-03-08 | End: 2024-03-18 | Stop reason: HOSPADM

## 2024-03-08 RX ORDER — PROMETHAZINE HYDROCHLORIDE 25 MG/ML
25 INJECTION, SOLUTION INTRAMUSCULAR; INTRAVENOUS ONCE
Status: COMPLETED | OUTPATIENT
Start: 2024-03-08 | End: 2024-03-08

## 2024-03-08 RX ORDER — SODIUM CHLORIDE 0.9 % (FLUSH) 0.9 %
5-40 SYRINGE (ML) INJECTION EVERY 12 HOURS SCHEDULED
Status: DISCONTINUED | OUTPATIENT
Start: 2024-03-08 | End: 2024-03-18 | Stop reason: HOSPADM

## 2024-03-08 RX ORDER — DROPERIDOL 2.5 MG/ML
0.62 INJECTION, SOLUTION INTRAMUSCULAR; INTRAVENOUS
Status: DISCONTINUED | OUTPATIENT
Start: 2024-03-08 | End: 2024-03-08 | Stop reason: HOSPADM

## 2024-03-08 RX ORDER — VASOPRESSIN 20 U/ML
INJECTION PARENTERAL PRN
Status: DISCONTINUED | OUTPATIENT
Start: 2024-03-08 | End: 2024-03-08 | Stop reason: SDUPTHER

## 2024-03-08 RX ORDER — DROPERIDOL 2.5 MG/ML
INJECTION, SOLUTION INTRAMUSCULAR; INTRAVENOUS
Status: DISCONTINUED
Start: 2024-03-08 | End: 2024-03-08

## 2024-03-08 RX ORDER — SODIUM CHLORIDE 9 MG/ML
INJECTION, SOLUTION INTRAVENOUS PRN
Status: DISCONTINUED | OUTPATIENT
Start: 2024-03-08 | End: 2024-03-18 | Stop reason: HOSPADM

## 2024-03-08 RX ORDER — ONDANSETRON 4 MG/1
4 TABLET, ORALLY DISINTEGRATING ORAL EVERY 8 HOURS PRN
Status: DISCONTINUED | OUTPATIENT
Start: 2024-03-08 | End: 2024-03-18 | Stop reason: HOSPADM

## 2024-03-08 RX ORDER — ENOXAPARIN SODIUM 100 MG/ML
40 INJECTION SUBCUTANEOUS DAILY
Status: DISCONTINUED | OUTPATIENT
Start: 2024-03-09 | End: 2024-03-08 | Stop reason: DRUGHIGH

## 2024-03-08 RX ORDER — PHENYLEPHRINE HCL IN 0.9% NACL 1 MG/10 ML
SYRINGE (ML) INTRAVENOUS PRN
Status: DISCONTINUED | OUTPATIENT
Start: 2024-03-08 | End: 2024-03-08 | Stop reason: SDUPTHER

## 2024-03-08 RX ORDER — LABETALOL HYDROCHLORIDE 5 MG/ML
5 INJECTION, SOLUTION INTRAVENOUS EVERY 10 MIN PRN
Status: DISCONTINUED | OUTPATIENT
Start: 2024-03-08 | End: 2024-03-08 | Stop reason: HOSPADM

## 2024-03-08 RX ORDER — KETOROLAC TROMETHAMINE 30 MG/ML
30 INJECTION, SOLUTION INTRAMUSCULAR; INTRAVENOUS EVERY 6 HOURS
Status: COMPLETED | OUTPATIENT
Start: 2024-03-08 | End: 2024-03-13

## 2024-03-08 RX ORDER — ONDANSETRON 2 MG/ML
4 INJECTION INTRAMUSCULAR; INTRAVENOUS
Status: DISCONTINUED | OUTPATIENT
Start: 2024-03-08 | End: 2024-03-08 | Stop reason: HOSPADM

## 2024-03-08 RX ORDER — DIPHENHYDRAMINE HYDROCHLORIDE 50 MG/ML
12.5 INJECTION INTRAMUSCULAR; INTRAVENOUS
Status: DISCONTINUED | OUTPATIENT
Start: 2024-03-08 | End: 2024-03-08 | Stop reason: HOSPADM

## 2024-03-08 RX ORDER — MEPERIDINE HYDROCHLORIDE 25 MG/ML
12.5 INJECTION INTRAMUSCULAR; INTRAVENOUS; SUBCUTANEOUS EVERY 5 MIN PRN
Status: DISCONTINUED | OUTPATIENT
Start: 2024-03-08 | End: 2024-03-08 | Stop reason: HOSPADM

## 2024-03-08 RX ORDER — NALOXONE HYDROCHLORIDE 0.4 MG/ML
INJECTION, SOLUTION INTRAMUSCULAR; INTRAVENOUS; SUBCUTANEOUS PRN
Status: DISCONTINUED | OUTPATIENT
Start: 2024-03-08 | End: 2024-03-08 | Stop reason: HOSPADM

## 2024-03-08 RX ORDER — PROPOFOL 10 MG/ML
INJECTION, EMULSION INTRAVENOUS PRN
Status: DISCONTINUED | OUTPATIENT
Start: 2024-03-08 | End: 2024-03-08 | Stop reason: SDUPTHER

## 2024-03-08 RX ORDER — LABETALOL HYDROCHLORIDE 5 MG/ML
INJECTION, SOLUTION INTRAVENOUS
Status: COMPLETED
Start: 2024-03-08 | End: 2024-03-08

## 2024-03-08 RX ORDER — ONDANSETRON 2 MG/ML
4 INJECTION INTRAMUSCULAR; INTRAVENOUS EVERY 6 HOURS PRN
Status: DISCONTINUED | OUTPATIENT
Start: 2024-03-08 | End: 2024-03-08

## 2024-03-08 RX ORDER — MIDAZOLAM HYDROCHLORIDE 1 MG/ML
INJECTION INTRAMUSCULAR; INTRAVENOUS PRN
Status: DISCONTINUED | OUTPATIENT
Start: 2024-03-08 | End: 2024-03-08 | Stop reason: SDUPTHER

## 2024-03-08 RX ORDER — SODIUM CHLORIDE 0.9 % (FLUSH) 0.9 %
5-40 SYRINGE (ML) INJECTION PRN
Status: DISCONTINUED | OUTPATIENT
Start: 2024-03-08 | End: 2024-03-08 | Stop reason: HOSPADM

## 2024-03-08 RX ADMIN — SODIUM CHLORIDE: 9 INJECTION, SOLUTION INTRAVENOUS at 18:18

## 2024-03-08 RX ADMIN — VASOPRESSIN 4 UNITS: 20 INJECTION INTRAVENOUS at 12:47

## 2024-03-08 RX ADMIN — HYDROMORPHONE HYDROCHLORIDE 0.5 MG: 1 INJECTION, SOLUTION INTRAMUSCULAR; INTRAVENOUS; SUBCUTANEOUS at 14:45

## 2024-03-08 RX ADMIN — LABETALOL HYDROCHLORIDE 5 MG: 5 INJECTION, SOLUTION INTRAVENOUS at 15:45

## 2024-03-08 RX ADMIN — PROMETHAZINE HYDROCHLORIDE 25 MG: 25 INJECTION INTRAMUSCULAR; INTRAVENOUS at 15:35

## 2024-03-08 RX ADMIN — Medication 300 MCG: at 12:42

## 2024-03-08 RX ADMIN — INDOCYANINE GREEN 6.25 MG: 25 INJECTION, POWDER, LYOPHILIZED, FOR SOLUTION INTRAVENOUS at 13:10

## 2024-03-08 RX ADMIN — KETOROLAC TROMETHAMINE 30 MG: 30 INJECTION INTRAMUSCULAR; INTRAVENOUS at 18:20

## 2024-03-08 RX ADMIN — HYDROMORPHONE HYDROCHLORIDE 0.5 MG: 1 INJECTION, SOLUTION INTRAMUSCULAR; INTRAVENOUS; SUBCUTANEOUS at 14:50

## 2024-03-08 RX ADMIN — SODIUM CHLORIDE: 9 INJECTION, SOLUTION INTRAVENOUS at 12:26

## 2024-03-08 RX ADMIN — VASOPRESSIN 2 UNITS: 20 INJECTION INTRAVENOUS at 12:52

## 2024-03-08 RX ADMIN — PANTOPRAZOLE SODIUM 40 MG: 40 INJECTION, POWDER, FOR SOLUTION INTRAVENOUS at 18:39

## 2024-03-08 RX ADMIN — SODIUM CHLORIDE: 9 INJECTION, SOLUTION INTRAVENOUS at 13:36

## 2024-03-08 RX ADMIN — VASOPRESSIN 2 UNITS: 20 INJECTION INTRAVENOUS at 13:56

## 2024-03-08 RX ADMIN — ALBUMIN (HUMAN) 12.5 G: 12.5 SOLUTION INTRAVENOUS at 12:49

## 2024-03-08 RX ADMIN — HYDROMORPHONE HYDROCHLORIDE 0.5 MG: 1 INJECTION, SOLUTION INTRAMUSCULAR; INTRAVENOUS; SUBCUTANEOUS at 14:30

## 2024-03-08 RX ADMIN — PIPERACILLIN AND TAZOBACTAM 3375 MG: 3; .375 INJECTION, POWDER, LYOPHILIZED, FOR SOLUTION INTRAVENOUS at 12:02

## 2024-03-08 RX ADMIN — HYDROMORPHONE HYDROCHLORIDE 0.5 MG: 1 INJECTION, SOLUTION INTRAMUSCULAR; INTRAVENOUS; SUBCUTANEOUS at 14:25

## 2024-03-08 RX ADMIN — ONDANSETRON 4 MG: 2 INJECTION INTRAMUSCULAR; INTRAVENOUS at 12:01

## 2024-03-08 RX ADMIN — ROCURONIUM BROMIDE 50 MG: 10 INJECTION INTRAVENOUS at 12:40

## 2024-03-08 RX ADMIN — Medication 100 MG: at 12:31

## 2024-03-08 RX ADMIN — MIDAZOLAM 2 MG: 1 INJECTION INTRAMUSCULAR; INTRAVENOUS at 12:26

## 2024-03-08 RX ADMIN — PIPERACILLIN AND TAZOBACTAM 3375 MG: 3; .375 INJECTION, POWDER, LYOPHILIZED, FOR SOLUTION INTRAVENOUS at 20:03

## 2024-03-08 RX ADMIN — HYDROMORPHONE HYDROCHLORIDE 1 MG: 1 INJECTION, SOLUTION INTRAMUSCULAR; INTRAVENOUS; SUBCUTANEOUS at 18:39

## 2024-03-08 RX ADMIN — ONDANSETRON 4 MG: 2 INJECTION INTRAMUSCULAR; INTRAVENOUS at 12:31

## 2024-03-08 RX ADMIN — PROPOFOL 200 MG: 10 INJECTION, EMULSION INTRAVENOUS at 12:31

## 2024-03-08 RX ADMIN — LABETALOL HYDROCHLORIDE 5 MG: 5 INJECTION, SOLUTION INTRAVENOUS at 15:20

## 2024-03-08 RX ADMIN — FENTANYL CITRATE 50 MCG: 50 INJECTION, SOLUTION INTRAMUSCULAR; INTRAVENOUS at 14:13

## 2024-03-08 RX ADMIN — VASOPRESSIN 2 UNITS: 20 INJECTION INTRAVENOUS at 12:57

## 2024-03-08 RX ADMIN — HYDROMORPHONE HYDROCHLORIDE 1 MG: 1 INJECTION, SOLUTION INTRAMUSCULAR; INTRAVENOUS; SUBCUTANEOUS at 11:58

## 2024-03-08 RX ADMIN — ALBUMIN (HUMAN) 12.5 G: 12.5 SOLUTION INTRAVENOUS at 13:10

## 2024-03-08 RX ADMIN — FENTANYL CITRATE 50 MCG: 50 INJECTION, SOLUTION INTRAMUSCULAR; INTRAVENOUS at 12:31

## 2024-03-08 RX ADMIN — Medication 200 MCG: at 12:36

## 2024-03-08 RX ADMIN — SUGAMMADEX 200 MG: 100 INJECTION, SOLUTION INTRAVENOUS at 14:12

## 2024-03-08 RX ADMIN — Medication 200 MCG: at 12:39

## 2024-03-08 RX ADMIN — KETOROLAC TROMETHAMINE 30 MG: 30 INJECTION INTRAMUSCULAR; INTRAVENOUS at 23:34

## 2024-03-08 RX ADMIN — Medication 140 MG: at 12:31

## 2024-03-08 ASSESSMENT — PAIN - FUNCTIONAL ASSESSMENT
PAIN_FUNCTIONAL_ASSESSMENT: INTOLERABLE, UNABLE TO DO ANY ACTIVE OR PASSIVE ACTIVITIES
PAIN_FUNCTIONAL_ASSESSMENT: INTOLERABLE, UNABLE TO DO ANY ACTIVE OR PASSIVE ACTIVITIES
PAIN_FUNCTIONAL_ASSESSMENT: 0-10
PAIN_FUNCTIONAL_ASSESSMENT: 0-10

## 2024-03-08 ASSESSMENT — PAIN DESCRIPTION - LOCATION
LOCATION: ABDOMEN
LOCATION: ABDOMEN

## 2024-03-08 ASSESSMENT — PAIN DESCRIPTION - PAIN TYPE: TYPE: SURGICAL PAIN

## 2024-03-08 ASSESSMENT — PAIN DESCRIPTION - DESCRIPTORS
DESCRIPTORS: SHARP;CRAMPING;SHOOTING
DESCRIPTORS: SHARP
DESCRIPTORS: CRAMPING;STABBING;SHOOTING
DESCRIPTORS: SHARP;STABBING

## 2024-03-08 ASSESSMENT — PAIN DESCRIPTION - FREQUENCY: FREQUENCY: CONTINUOUS

## 2024-03-08 ASSESSMENT — PAIN SCALES - GENERAL
PAINLEVEL_OUTOF10: 9
PAINLEVEL_OUTOF10: 8
PAINLEVEL_OUTOF10: 0
PAINLEVEL_OUTOF10: 0

## 2024-03-08 ASSESSMENT — PAIN DESCRIPTION - ONSET: ONSET: ON-GOING

## 2024-03-08 ASSESSMENT — PAIN DESCRIPTION - ORIENTATION: ORIENTATION: MID

## 2024-03-08 NOTE — PROGRESS NOTES
1423  Awake and oriented on arrival to PACU , pt moaning and c/o # 10 ABD pain , HOB elevated , O2 4L NC applied   1425 medicated with Dilaudid 0.5 mg IV   1430 no change in pain , medicated with Dilaudid 0.5 mg IV   1435 c/o nausea , medicated with Droperidol   1445 pt states no change in pain , nausea slightly improved , medicated with Dilaudid 0.5 mg IV   1450 pain unchanged , medicated with Dilaudid 0.5 mg IV   1455 eyes closed resp easy   1510 eyes closed , light snoring noted   1518 Pt HR continues to go up , pt states no change in pain and nausea back , IV fluids wide open   1520 Dr Pitts to bedside updated on pt , orders given  pt medicated with Trandate   1535 Pt medicated with Phenergan 25 mg IV , pt HR down to mid 90,s BBB and ST elevation remains , spoke with DR Rivera orders given   1541 EKG at bedside   1545 EKG reviewed by Dr Rivera , ok for pt to got to floor after 2nd dose of Trandate 5mg IV , dose given , pt resting resp easy   1600 resting resp easy   1615 awakens easily to name , states pain a # 7 and nausea gone , drifts back to sleep easily   1622 meets criteria for discharge , transported to  , wife in room waiting

## 2024-03-08 NOTE — ANESTHESIA POSTPROCEDURE EVALUATION
Department of Anesthesiology  Postprocedure Note    Patient: Noe Moctezuma  MRN: [id number]  YOB: 1975  Date of evaluation: 3/8/2024    Procedure Summary     Date: 03/08/24 Room / Location: [facility] OR [address] [place] [address] OR    Anesthesia Start: 1226 Anesthesia Stop: 1427    Procedure: Exploratory Laparotomy, Diverting Colostomy (Abdomen) Diagnosis:       Anastomotic leak of intestine      (Anastomotic leak of intestine [K91.89])    Surgeons: Wale Fierro MD Responsible Provider: Nathan Pitts DO    Anesthesia Type: general ASA Status: 2          Anesthesia Type: No value filed.    Dudley Phase I: Dudley Score: 9    Dudley Phase II:      Anesthesia Post Evaluation    Patient location during evaluation: PACU  Patient participation: complete - patient participated  Level of consciousness: awake and alert  Airway patency: patent  Nausea & Vomiting: no vomiting and nausea (Phenergan resolved nausea)  Cardiovascular status: hemodynamically stable and tachycardic (Labetalol given, tachycardia resolved)  Respiratory status: acceptable and nasal cannula  Hydration status: stable  Pain management: adequate (treated with Dilaudid and pain now tolerable)        No notable events documented.

## 2024-03-08 NOTE — H&P
[facility]  General Surgery History & Physical - Madai Vitale, APRN - CNP  On behalf of Dr. 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   Stage pT2 N0.    has a past medical history of Allergic rhinitis, Arthritis, Cancer (HCC), Colitis, Diverticulitis, and Hyperlipidemia.   PLANS   Planning Exploratory Laparotomy with Diverting Colostomy with Dr Fierro   Obtain consent, IV fluid,  zosyn, BMP and CBC stat.   Analgesia and antiemetics as ordered   IV antibiotics   GI prophylaxis   SCD for DVT prophylaxis   Wound and ostomy evaluation for new colostomy post surgery   Admit to inpatient   Expected LOS 5 days   Planning home at discharge   SUBJECTIVE   History of Chief Complaint:    Noe is a 48 y.o.male who presented to Hospitals in [place] with abdominal pain and vomiting.  He was a left sigmoid colon resection for adenocarcinoma on 2/28/24 with Dr Covington. He was transferred to [facility] for the need for surgical intervention with Dr Fierro. He states he had gas pain yesterday and took a pain pill. And it seemed to have resolved.  He has been having bowel function and has denies N&V until early this am.  He states he woke up about 0430 this am with severe abdominal pain, ate jello and took a pain pill.  However he vomited up the jello. The pain continued to get worse so he went to hospital in [place], [place].  He denies any recent fevers or chills and has been eating a normal diet. Again onset of symptoms with gas pain was yesterday. Past Medical  bilaterally.  ABDOMEN: distended and Rigid, minimal bowel sounds, tenderness noted.   NEUROLOGIC: There are no focalizing motor or sensory deficits. CN II-XII are grossly intact..   EXTREMITIES: no cyanosis, no clubbing, and no edema.  LABS   No results for input(s): \"WBC\", \"HGB\", \"HCT\", \"PLT\", \"NA\", \"K\", \"CL\", \"CO2\", \"BUN\", \"CREATININE\", \"MG\", \"PHOS\", \"CALCIUM\", \"PTT\", \"INR\", \"AST\", \"ALT\", \"BILITOT\", \"BILIDIR\", \"AMYLASE\", \"LIPASE\", \"LDH\", \"LACTA\", \"NITRU\", \"COLORU\", \"BACTERIA\" in the last 72 hours.    Invalid input(s): \"PT\", \"WBCU\", \"RBCU\", \"LEUKOCYTESUA\"  RADIOLOGY     No orders to display       Electronically signed by ALL Randolph CNP on 3/8/2024 at 12:08 PM         Patient seen and examined independently by me 3/8/2024  I saw this patient in same-day surgery examined  I reviewed his outside CT scan and discussion  I discussed with the patient the subjective plans that we will plan for diverting colostomy takedown of his anastomosis as this is a likely source of his free air and fluid  I did have a phone conversation with Dr. Covington regarding the surgery and how it was done  I did review his prior op note from how it was done  We did give him broad-spectrum antibiotics preoperatively  On exam he is awake alert in severe distress  Obviously has a rigid abdomen with tenderness  Needs urgent laparotomy   I personally supervised the PA/NP in the evaluation, management and development of the treatment plan for Noe Moctezuma  on the same date of service as above.      I personally interviewed Noe Moctezuma   and  discussed his review of symptoms as able due to the patient's condition, as well as performed an individual physical exam on the same   date of service as above.  In addition I discussed the patient's condition and treatment options with the patient, if able, and/or designated family if available.      I have also reviewed and agree with the past medical,  family and social history updates as  well as care plans unless otherwise noted below.  All questions were answered.      I examined independently and reviewed relevant data myself and may have done so in the context of team rounds.  A full chart review was performed by me.       I attest that this medical record entry accurately reflects signatures and notations that I made in my capacity as an M.D. when I treated and diagnosed Noe Moctezuma on the date of service above     I was responsible for all medical decision making involving this encounter.      I identified and/or confirmed all problems associated with this patient encounter by my own direct physical examination of this patient and review of all radiology studies and labwork  that were ordered and available.    Active Hospital Problems    Diagnosis     S/P laparotomy [Z98.890]     Anastomotic leak of intestine [K91.89]     S/P colostomy (HCC) [Z93.3]     Acute abdomen [R10.0]     Fecal peritonitis (HCC) [K65.8]     Hypertension [I10]         I  discussed the management of all of the identified problems with the APN or PA.      I formulated the treatment plan for all identified problems and discussed those with the APN or PA .      This management plan was then carried out and the patient's orders for care by the APN or PA.      Total time personally spent on this patient encounter was 67 minutes which includes :  Preparing to see the patient( reviewing tests and chart)  Obtaining and reviewing separately obtained history  Performing a medically appropriate examination and evaluation  Ordering medications, tests, or procedures  Counseling and educating the patient/family/caregiver  Care coordination  Referring and communicating with other healthcare professionals  Documenting clinical information in the EHR  Independent interpretation of results and communicating the results to patient and care team  This includes a direct physical exam as well as all the other encounter activities described

## 2024-03-08 NOTE — PROGRESS NOTES
Pharmacy Note - Extended Infusion Beta-Lactam Dose Adjustment    Piperacillin/Tazobactam 3375 mg q6h intermittent infusion ordered for the treatment of Intra-abdominal Infection. Per [facility] Extended Infusion Beta-Lactam Policy, this will be changed to 3375 mg q8h extended infusion     Estimated Creatinine Clearance: Estimated Creatinine Clearance: 141 mL/min (based on SCr of 0.8 mg/dL).    Dialysis Status, ANGEL, CKD: Normal    BMI: 32.45 kg/m2    Rationale for Adjustment: Dose adjusted per [facility] Extended Infusion Policy based on renal function and indication. The above medication is renally eliminated and demonstrates time-dependent effects on bacterial eradication. Extended-infusion dosing strategy aims to enhance microbiologic and clinical efficacy.     Pharmacy will monitor renal function daily and adjust dose as necessary.      Please call with any questions.    Thank you,  Shaye BENAVIDES.Ph., [facility] 3/8/2024 12:16 PM

## 2024-03-08 NOTE — TELEPHONE ENCOUNTER
Zahra HURST from [facility] ER called stating pt is in the ER their for abdominal pain and vomiting.  They did a CT w/o contrast.  ER provider, Dr Lewis, would like to speak with Dr Covington, advised provider out of office but on call provider is Dr Hale. Dr hale notified of this.  Contacted Madai Vitale CNP also.  Images being pushed over.

## 2024-03-08 NOTE — OP NOTE
[facility]  Operative Report    PATIENT NAME: Noe Moctezuma  MEDICAL RECORD NO. [id number]  SURGEON: Wale Fierro MD MD FACS  Primary Care Physician: Iliana Mejia MD  Date: 3/8/2024, 2:44 PM     PROCEDURE PERFORMED: 1.  Exploratory laparotomy    2.  Takedown and resection of a leaking anastomosis with diverting colostomy    3.  Fluoroscein intraoperative evaluation of the right colon and remaining transverse colon for viability    4.  Intra-abdominal lavage with 4000 cc of fluid and 500 cc of irricept    5.  Gross Fecal peritonitis present at the time of surgery PATOS  PREOPERATIVE DIAGNOSIS:   Active Hospital Problems    Diagnosis Date Noted    S/P laparotomy [Z98.890] 03/08/2024    Anastomotic leak of intestine [K91.89] 03/08/2024    S/P colostomy (HCC) [Z93.3] 03/08/2024    Acute abdomen [R10.0] 03/08/2024    Hypertension [I10] 11/30/2023      POSTOPERATIVE DIAGNOSIS: Same, path pending  SURGEON:  Wale Fierro MD MD [facility]  ANESTHESIA:  General endotracheal anesthesia and local  ESTIMATED BLOOD LOSS:  100  ml  SPECIMEN: resection of anastomosis  COMPLICATIONS:  None; patient tolerated the procedure well.  DRAINS: (2) 19 Franky drain(s) in the 1 in each gutter right and left all the way up to the spleen on the left and up above the liver on the right  DISPOSITION: Recovery Room  CONDITION: stable      Narrative:    Patient brought to the operating room from same-day surgery.  Preoperative antibiotics to Zosyn given SCDs in place  On the chart.  General trach anesthesia administered.  Nasogastric tube was inserted Lancaster catheter was inserted without difficulty.  Abdomen the previous Steri-Strips were removed.  The skin closure device on the midline incision was removed and the abdomen was prepped with DuraPrep.  3 minutes allowed to pass draped sterilely  The previous midline incision that was carried out to the right of the umbilicus was incised subcutaneous tissue was closed with a  mobilized up the as we got up towards hepatic flexure there was a lot of inflammation and adhesion near the duodenum which was taken down sharply.  The mesentery of the remaining transverse colon was shortened edematous and thickened and I could not really feel because the tissue was so thick whether there was actually a palpable pulse in the remaining middle colic or not.  With the colon mobilized upwards I thought the best thing to do would be to try and decompress it so the end that we had remaining we opened and placed fenestrated suction catheter down but what ever was and it was way too thick for any decompression.  So at this point I opened up the end larger brought up a basin and there was a large amount at least 2 L of this thick green stool that we milked from the cecum up the ascending around hepatic and out the colotomy in the remaining part of the transverse colon.  We milked this all out to decompress the colon prior to fluorescein exam.  At this point I give the patient 2-1/2 mg of indocyanine green dye.  And then with the lights out using the fluorescein scope.  The blood supply was noted in the cecum ascending and the remaining part of the transverse colon bright green so the blood supply appeared to be viable there were 2 darker spots on the antimesenteric border of the cecum 1 almost directly across from the ileocecal valve 1 slightly higher there was green fluorescein up to the edges of this and look like it was this way from under pressure.  Since the rest the colon had adequate blood supply I did not really want to proceed with a resection of the remaining part of the proximal colon so I elected to oversew these areas with 2-0 silk imbricating Lembert sutures.  At both sites.  At this point we then irrigated out the abdomen with 4 L of warm normal saline cleaning out all gutters the pelvis above the liver above the spleen everywhere else.  We then checked the NG tube position was eventually

## 2024-03-08 NOTE — PROGRESS NOTES
Pharmacist Review and Automatic Dose Adjustment of Prophylactic Enoxaparin         The reviewing pharmacist has made an adjustment to the ordered enoxaparin dose or converted to UFH per the approved [facility] protocol and table as identified below.        Noe Moctezuma is a 48 y.o. male.     Recent Labs     03/08/24  1217   CREATININE 1.2       Estimated Creatinine Clearance: 94 mL/min (based on SCr of 1.2 mg/dL).    Recent Labs     03/08/24  1217   HGB 16.2   HCT 46.4        No results for input(s): \"INR\" in the last 72 hours.    Height:   Ht Readings from Last 1 Encounters:   02/28/24 1.829 m (6')     Weight:  Wt Readings from Last 1 Encounters:   03/02/24 108.5 kg (239 lb 4.8 oz)               Plan: Based upon the patient's weight and renal function    Ordered: Enoxaparin 40mg SUBQ Daily    Changed/converted to    New Order: Enoxaparin 30mg SUBQ BID      Thank you,  Torri Tapia, [facility]  3/8/2024, 4:38 PM

## 2024-03-08 NOTE — ANESTHESIA PRE PROCEDURE
Department of Anesthesiology  Preprocedure Note       Name:  Noe Moctezuma   Age:  48 y.o.  :  1975                                          MRN:  [id number]         Date:  3/8/2024      Surgeon: Surgeon(s):  Wale Fierro MD    Procedure: Procedure(s):  Exploratory Laparotomy Colostomy    Medications prior to admission:   Prior to Admission medications    Medication Sig Start Date End Date Taking? Authorizing Provider   oxyCODONE-acetaminophen (PERCOCET) 5-325 MG per tablet Take 1 tablet by mouth every 6 hours as needed for Pain for up to 7 days. Max Daily Amount: 4 tablets 3/3/24 3/10/24  Madai Vitale, APRN - CNP   acetaminophen (TYLENOL) 500 MG tablet Take 1 tablet by mouth every 6 hours as needed for Pain    Poncho Arita MD   vitamin C (ASCORBIC ACID) 500 MG tablet Take 1 tablet by mouth as needed (Cold symptoms)  Patient not taking: Reported on 3/4/2024    Poncho Arita MD   zinc gluconate 50 MG tablet Take 1 tablet by mouth as needed (Cold symptoms)  Patient not taking: Reported on 3/4/2024    Poncho Arita MD   mesalamine (CANASA) 1000 MG suppository Place 1 suppository rectally nightly  Patient not taking: Reported on 3/4/2024 1/18/24   Iliana Mejia MD   Triamcinolone Acetonide (NASACORT ALLERGY 24HR NA) as needed  Patient not taking: Reported on 3/4/2024    Poncho Arita MD   Fexofenadine HCl (ALLEGRA ALLERGY PO) Take 1 tablet by mouth as needed  Patient not taking: Reported on 3/4/2024    Poncho Arita MD       Current medications:    No current facility-administered medications for this encounter.       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

## 2024-03-09 LAB
ANION GAP SERPL CALC-SCNC: 13 MEQ/L (ref 8–16)
BASOPHILS ABSOLUTE: 0.1 THOU/MM3 (ref 0–0.1)
BASOPHILS NFR BLD AUTO: 0.6 %
BUN SERPL-MCNC: 19 MG/DL (ref 7–22)
CALCIUM SERPL-MCNC: 8.7 MG/DL (ref 8.5–10.5)
CHLORIDE SERPL-SCNC: 104 MEQ/L (ref 98–111)
CO2 SERPL-SCNC: 24 MEQ/L (ref 23–33)
CREAT SERPL-MCNC: 1.3 MG/DL (ref 0.4–1.2)
DEPRECATED RDW RBC AUTO: 42.6 FL (ref 35–45)
EOSINOPHIL NFR BLD AUTO: 0 %
EOSINOPHILS ABSOLUTE: 0 THOU/MM3 (ref 0–0.4)
ERYTHROCYTE [DISTWIDTH] IN BLOOD BY AUTOMATED COUNT: 13.3 % (ref 11.5–14.5)
GFR SERPL CREATININE-BSD FRML MDRD: > 60 ML/MIN/1.73M2
GLUCOSE SERPL-MCNC: 120 MG/DL (ref 70–108)
HCT VFR BLD AUTO: 45.6 % (ref 42–52)
HGB BLD-MCNC: 15.3 GM/DL (ref 14–18)
IMM GRANULOCYTES # BLD AUTO: 0.08 THOU/MM3 (ref 0–0.07)
IMM GRANULOCYTES NFR BLD AUTO: 0.5 %
LYMPHOCYTES ABSOLUTE: 0.8 THOU/MM3 (ref 1–4.8)
LYMPHOCYTES NFR BLD AUTO: 5.6 %
MCH RBC QN AUTO: 29.5 PG (ref 26–33)
MCHC RBC AUTO-ENTMCNC: 33.6 GM/DL (ref 32.2–35.5)
MCV RBC AUTO: 88 FL (ref 80–94)
MONOCYTES ABSOLUTE: 1.8 THOU/MM3 (ref 0.4–1.3)
MONOCYTES NFR BLD AUTO: 12 %
NEUTROPHILS NFR BLD AUTO: 81.3 %
NRBC BLD AUTO-RTO: 0 /100 WBC
PLATELET # BLD AUTO: 272 THOU/MM3 (ref 130–400)
PLATELET BLD QL SMEAR: ADEQUATE
PMV BLD AUTO: 11.2 FL (ref 9.4–12.4)
POTASSIUM SERPL-SCNC: 4.7 MEQ/L (ref 3.5–5.2)
RBC # BLD AUTO: 5.18 MILL/MM3 (ref 4.7–6.1)
SCAN OF BLOOD SMEAR: NORMAL
SEGMENTED NEUTROPHILS ABSOLUTE COUNT: 12 THOU/MM3 (ref 1.8–7.7)
SODIUM SERPL-SCNC: 141 MEQ/L (ref 135–145)
WBC # BLD AUTO: 14.8 THOU/MM3 (ref 4.8–10.8)

## 2024-03-09 PROCEDURE — 6360000002 HC RX W HCPCS: Performed by: SURGERY

## 2024-03-09 PROCEDURE — 80048 BASIC METABOLIC PNL TOTAL CA: CPT

## 2024-03-09 PROCEDURE — 36415 COLL VENOUS BLD VENIPUNCTURE: CPT

## 2024-03-09 PROCEDURE — 6370000000 HC RX 637 (ALT 250 FOR IP): Performed by: SURGERY

## 2024-03-09 PROCEDURE — 2580000003 HC RX 258: Performed by: SURGERY

## 2024-03-09 PROCEDURE — 85025 COMPLETE CBC W/AUTO DIFF WBC: CPT

## 2024-03-09 PROCEDURE — C9113 INJ PANTOPRAZOLE SODIUM, VIA: HCPCS | Performed by: SURGERY

## 2024-03-09 PROCEDURE — 1200000000 HC SEMI PRIVATE

## 2024-03-09 PROCEDURE — 99024 POSTOP FOLLOW-UP VISIT: CPT | Performed by: SURGERY

## 2024-03-09 RX ADMIN — HYDROMORPHONE HYDROCHLORIDE 1 MG: 1 INJECTION, SOLUTION INTRAMUSCULAR; INTRAVENOUS; SUBCUTANEOUS at 16:13

## 2024-03-09 RX ADMIN — HYDROMORPHONE HYDROCHLORIDE 1 MG: 1 INJECTION, SOLUTION INTRAMUSCULAR; INTRAVENOUS; SUBCUTANEOUS at 23:50

## 2024-03-09 RX ADMIN — PIPERACILLIN AND TAZOBACTAM 3375 MG: 3; .375 INJECTION, POWDER, LYOPHILIZED, FOR SOLUTION INTRAVENOUS at 12:08

## 2024-03-09 RX ADMIN — SODIUM CHLORIDE, PRESERVATIVE FREE 10 ML: 5 INJECTION INTRAVENOUS at 19:36

## 2024-03-09 RX ADMIN — KETOROLAC TROMETHAMINE 30 MG: 30 INJECTION INTRAMUSCULAR; INTRAVENOUS at 16:15

## 2024-03-09 RX ADMIN — SODIUM CHLORIDE: 9 INJECTION, SOLUTION INTRAVENOUS at 08:00

## 2024-03-09 RX ADMIN — SODIUM CHLORIDE: 9 INJECTION, SOLUTION INTRAVENOUS at 22:27

## 2024-03-09 RX ADMIN — SODIUM CHLORIDE: 9 INJECTION, SOLUTION INTRAVENOUS at 15:30

## 2024-03-09 RX ADMIN — NALOXEGOL OXALATE 25 MG: 25 TABLET, FILM COATED ORAL at 09:05

## 2024-03-09 RX ADMIN — KETOROLAC TROMETHAMINE 30 MG: 30 INJECTION INTRAMUSCULAR; INTRAVENOUS at 04:47

## 2024-03-09 RX ADMIN — ENOXAPARIN SODIUM 30 MG: 100 INJECTION SUBCUTANEOUS at 09:04

## 2024-03-09 RX ADMIN — KETOROLAC TROMETHAMINE 30 MG: 30 INJECTION INTRAMUSCULAR; INTRAVENOUS at 10:01

## 2024-03-09 RX ADMIN — KETOROLAC TROMETHAMINE 30 MG: 30 INJECTION INTRAMUSCULAR; INTRAVENOUS at 22:27

## 2024-03-09 RX ADMIN — SODIUM CHLORIDE, PRESERVATIVE FREE 10 ML: 5 INJECTION INTRAVENOUS at 09:06

## 2024-03-09 RX ADMIN — HYDROMORPHONE HYDROCHLORIDE 1 MG: 1 INJECTION, SOLUTION INTRAMUSCULAR; INTRAVENOUS; SUBCUTANEOUS at 19:35

## 2024-03-09 RX ADMIN — HYDROMORPHONE HYDROCHLORIDE 1 MG: 1 INJECTION, SOLUTION INTRAMUSCULAR; INTRAVENOUS; SUBCUTANEOUS at 09:03

## 2024-03-09 RX ADMIN — HYDROMORPHONE HYDROCHLORIDE 1 MG: 1 INJECTION, SOLUTION INTRAMUSCULAR; INTRAVENOUS; SUBCUTANEOUS at 01:15

## 2024-03-09 RX ADMIN — ONDANSETRON 4 MG: 2 INJECTION INTRAMUSCULAR; INTRAVENOUS at 23:59

## 2024-03-09 RX ADMIN — HYDROMORPHONE HYDROCHLORIDE 1 MG: 1 INJECTION, SOLUTION INTRAMUSCULAR; INTRAVENOUS; SUBCUTANEOUS at 05:21

## 2024-03-09 RX ADMIN — HYDROMORPHONE HYDROCHLORIDE 1 MG: 1 INJECTION, SOLUTION INTRAMUSCULAR; INTRAVENOUS; SUBCUTANEOUS at 12:07

## 2024-03-09 RX ADMIN — PIPERACILLIN AND TAZOBACTAM 3375 MG: 3; .375 INJECTION, POWDER, LYOPHILIZED, FOR SOLUTION INTRAVENOUS at 19:34

## 2024-03-09 RX ADMIN — PIPERACILLIN AND TAZOBACTAM 3375 MG: 3; .375 INJECTION, POWDER, LYOPHILIZED, FOR SOLUTION INTRAVENOUS at 04:50

## 2024-03-09 RX ADMIN — ONDANSETRON 4 MG: 2 INJECTION INTRAMUSCULAR; INTRAVENOUS at 14:45

## 2024-03-09 RX ADMIN — ENOXAPARIN SODIUM 30 MG: 100 INJECTION SUBCUTANEOUS at 19:37

## 2024-03-09 RX ADMIN — PANTOPRAZOLE SODIUM 40 MG: 40 INJECTION, POWDER, FOR SOLUTION INTRAVENOUS at 09:04

## 2024-03-09 ASSESSMENT — PAIN SCALES - GENERAL
PAINLEVEL_OUTOF10: 5
PAINLEVEL_OUTOF10: 7
PAINLEVEL_OUTOF10: 7
PAINLEVEL_OUTOF10: 6
PAINLEVEL_OUTOF10: 8
PAINLEVEL_OUTOF10: 0
PAINLEVEL_OUTOF10: 7
PAINLEVEL_OUTOF10: 5
PAINLEVEL_OUTOF10: 8
PAINLEVEL_OUTOF10: 5
PAINLEVEL_OUTOF10: 7

## 2024-03-09 ASSESSMENT — PAIN DESCRIPTION - ORIENTATION
ORIENTATION: MID
ORIENTATION: DISTAL
ORIENTATION: MID
ORIENTATION: MID;LOWER;UPPER
ORIENTATION: MID
ORIENTATION: MID;LOWER;UPPER
ORIENTATION: MID

## 2024-03-09 ASSESSMENT — PAIN DESCRIPTION - LOCATION
LOCATION: ABDOMEN

## 2024-03-09 ASSESSMENT — PAIN - FUNCTIONAL ASSESSMENT
PAIN_FUNCTIONAL_ASSESSMENT: ACTIVITIES ARE NOT PREVENTED

## 2024-03-09 ASSESSMENT — PAIN DESCRIPTION - DESCRIPTORS
DESCRIPTORS: ACHING
DESCRIPTORS: ACHING
DESCRIPTORS: SHARP
DESCRIPTORS: SHARP
DESCRIPTORS: ACHING
DESCRIPTORS: SHARP
DESCRIPTORS: ACHING
DESCRIPTORS: ACHING

## 2024-03-09 NOTE — PROGRESS NOTES
Wale Fierro MD  [facility]  General Surgery Postoperative Progress Note    Pt Name: Noe Moctezuma  Medical Record Number: [id number]  Date of Birth 1975   Today's Date: 3/9/2024    CC:No chief complaint on file.      ASSESSMENT   POD # 1  HD # 1  PLAN   Allow ice chips sips of water popsicle  Leave IV at current rate and Lancaster in place as his creatinine is higher than admission  Hemoglobin stable  White count increased no surprise  Continue broad-spectrum antibiotics  Lovenox and SCDs for DVT prophylaxis  Ostomy edematous like the whole colon was but appears vascularly viable  SUBJECTIVE   Noe is doing much better than admission. He denies any nausea or vomiting, has not passed flatus or had a bowel movement via the ostomy. He is tolerating a Diet NPO Exceptions are: Ice Chips, Sips of Water with Meds. His pain is well controlled on current medications.  CURRENT MEDICATIONS   Scheduled Meds:   pantoprazole  40 mg IntraVENous Daily    piperacillin-tazobactam  3,375 mg IntraVENous Q8H    sodium chloride flush  5-40 mL IntraVENous 2 times per day    naloxegol  25 mg Oral Daily    ketorolac  30 mg IntraVENous Q6H    enoxaparin  30 mg SubCUTAneous BID     Continuous Infusions:   sodium chloride 150 mL/hr at 24 1818    sodium chloride       PRN Meds:.sodium chloride flush, sodium chloride, HYDROmorphone, ondansetron **OR** ondansetron  ROS:   History obtained from chart review and the patient, General ROS:   OBJECTIVE   CURRENT VITALS:  axillary temperature is 97.9 °F (36.6 °C). His blood pressure is 109/77 and his pulse is 80. His respiration is 20 and oxygen saturation is 95%.  There is no height or weight on file to calculate BMI.  Temperature Range (24h):Temp: 97.9 °F (36.6 °C) Temp  Av.4 °F (36.9 °C)  Min: 97 °F (36.1 °C)  Max: 99.7 °F (37.6 °C)  BP Range (24h): Systolic (24hrs), Av , Min:109 , Max:142     Diastolic (24hrs), Av, Min:70, Max:96    Pulse Range (24h):  Pulse  Av.2  Min: 77  Max: 123  Respiration Range (24h): Resp  Av.7  Min: 14  Max: 29  Current Pulse Ox (24h):  SpO2: 95 %  Pulse Ox Range (24h):  SpO2  Av.7 %  Min: 90 %  Max: 99 %  Oxygen Amount and Delivery: O2 Flow Rate (L/min): 2 L/min (weaned to 2L)  Incentive Spirometry Tx:          In: 1900 [I.V.:1400]  Out: 1950 [Urine:1150; Drains:250]  Closed/Suction Drain RLQ Bulb-Output (ml): 90 ml  Closed/Suction Drain Left LLQ Bulb-Output (ml): 50 ml  Date 24 0000 - 24 2359   Shift 5878-9117 4967-1983 7736-7181 24 Hour Total   INTAKE   Shift Total       OUTPUT   Urine 300   300   Emesis/NG output 450   450   Drains 140   140   Shift Total 890   890   Weight (kg)         PHYSICAL EXAM     CONSTITUTIONAL: Alert and oriented times 3, no acute distress and cooperative to examination with proper mood and affect.  .  ABDOMEN: Normal shape. large midline scar and drains both lower quadrants scar(s) ostomy left upper/mid viable, somewhat edematous.  Drains fluid is serosanguineous appearing  .   EXTREMITIES: no cyanosis, no clubbing, and no edema      LABS     Recent Labs     24  1217 24  0502   WBC 7.0 14.8*   HGB 16.2 15.3   HCT 46.4 45.6    272    141   K 4.0 4.7    104   CO2 22* 24   BUN 15 19   CREATININE 1.2 1.3*   CALCIUM 8.8 8.7      No results for input(s): \"PTT\", \"INR\" in the last 72 hours.    Invalid input(s): \"PT\"  No results for input(s): \"AST\", \"ALT\", \"BILITOT\", \"BILIDIR\", \"AMYLASE\", \"LIPASE\", \"LDH\", \"LACTA\" in the last 72 hours.  No results for input(s): \"TROPONINT\" in the last 72 hours.  RADIOLOGY     No orders to display     CT Abdomen Pelvis With IV Contrast ONLY (No Oral Contrast)    Result Date: 3/8/2024  1. There is moderate-to-large free intraperitoneal air.  Moderate ascites is present.  Greater than 2 weeks post surgery, these findings are suspicious for anastomotic leak versus residual postop changes. 2. Trace bilateral pleural effusions and

## 2024-03-09 NOTE — CARE COORDINATION
03/09/24 0905   Readmission Assessment   Number of Days since last admission? 8-30 days   Previous Disposition Home with Family   Who is being Interviewed Patient  (wife Sharri also at the bedside)   What was the patient's/caregiver's perception as to why they think they needed to return back to the hospital? Other (Comment)  (N/V , pain)   Did you visit your Primary Care Physician after you left the hospital, before you returned this time? No   Why weren't you able to visit your PCP? Other (Comment)  (re-admitted too soon)   Did you see a specialist, such as Cardiac, Pulmonary, Orthopedic Physician, etc. after you left the hospital? No  (re-admitted too soon)   Who advised the patient to return to the hospital? Self-referral   Does the patient report anything that got in the way of taking their medications? No   In our efforts to provide the best possible care to you and others like you, can you think of anything that we could have done to help you after you left the hospital the first time, so that you might not have needed to return so soon? Other (Comment)  (patient expresses: \"maybe some more education on diet\" wife expresses: \"he mostly ate soup, jello, only soft foods\")     Needs identified: IP dietician c/s. Will speak with attending about orders.

## 2024-03-09 NOTE — CARE COORDINATION
03/09/24 0908   Service Assessment   Patient Orientation Alert and Oriented   Cognition Alert   History Provided By Patient   Primary Caregiver Self   Accompanied By/Relationship spouse, Sharri   Support Systems Spouse/Significant Other;Family Members;[employer]/[place]   Patient's Healthcare Decision Maker is: Patient Declined (Legal Next of Kin Remains as Decision Maker)   PCP Verified by CM Yes   Last Visit to PCP Within last 3 months   Prior Functional Level Independent in ADLs/IADLs   Current Functional Level Independent in ADLs/IADLs   Can patient return to prior living arrangement Yes   Ability to make needs known: Good   Family able to assist with home care needs: Yes   Would you like for me to discuss the discharge plan with any other family members/significant others, and if so, who? Yes  (Sharri)   Financial Resources Other (Comment)  ([facility]in ([facility]   Community Resources None   Social/Functional History   Active  Yes   Discharge Planning   Type of Residence House   Living Arrangements Spouse/Significant Other   Current Services Prior To Admission None   Potential Assistance Needed Home Care  (depending on clinical course)   DME Ordered? No   Potential Assistance Purchasing Medications No   Type of Home Care Services None   Patient expects to be discharged to: House   Services At/After Discharge   Mode of Transport at Discharge Other (see comment)  (family)   Confirm Follow Up Transport Family     Patient Goals/Plan/Treatment Preferences: Corey plans to return home with his wife Sharri. He is currently 1 POD with surgical drains, NG tube, kim and new colostomy. Discussed re-admission and discharge needs. Corey expresses that he would like more education on diet. CM suggested dietician consult, and he agrees. Discussed discharge dispo and possible  nurse. Corey would like to assess his needs and comfort level with self-care and decide closer to discharge. CM to follow.     If patient is discharged  prior to next notation, then this note serves as note for discharge by case management.

## 2024-03-09 NOTE — PROGRESS NOTES
Pt admitted to  [facility] via bed from  surgery .  Complaints: Surgery.    IV normal saline infusing into the forearm right, condition patent and no redness at a rate of 125 mls/ hour with about 500 mls in the bag still. IV site free of s/s of infection or infiltration. Vital signs obtained. Assessment and data collection initiated. Two nurse skin assessment performed by Myron RN and Quentin RN. Oriented to room. Policies and procedures for  explained. All questions answered with no further questions at this time. Fall prevention and safety brochure discussed with patient.  Bed alarm on. Call light in reach.Oriented to room.   Myron Brewer, RN, RN 3/8/2024 7:51 PM     Explained patients right to have family, representative or physician notified of their admission.  Patient has Declined for physician to be notified.  Patient has Declined for family/representative to be notified.

## 2024-03-10 LAB
BASOPHILS ABSOLUTE: 0 THOU/MM3 (ref 0–0.1)
BASOPHILS NFR BLD AUTO: 0.3 %
DEPRECATED RDW RBC AUTO: 43.8 FL (ref 35–45)
EOSINOPHIL NFR BLD AUTO: 2.5 %
EOSINOPHILS ABSOLUTE: 0.4 THOU/MM3 (ref 0–0.4)
ERYTHROCYTE [DISTWIDTH] IN BLOOD BY AUTOMATED COUNT: 13.4 % (ref 11.5–14.5)
HCT VFR BLD AUTO: 37.8 % (ref 42–52)
HGB BLD-MCNC: 12.6 GM/DL (ref 14–18)
IMM GRANULOCYTES # BLD AUTO: 0.07 THOU/MM3 (ref 0–0.07)
IMM GRANULOCYTES NFR BLD AUTO: 0.5 %
LYMPHOCYTES ABSOLUTE: 1 THOU/MM3 (ref 1–4.8)
LYMPHOCYTES NFR BLD AUTO: 6.9 %
MCH RBC QN AUTO: 29.6 PG (ref 26–33)
MCHC RBC AUTO-ENTMCNC: 33.3 GM/DL (ref 32.2–35.5)
MCV RBC AUTO: 88.9 FL (ref 80–94)
MONOCYTES ABSOLUTE: 1.6 THOU/MM3 (ref 0.4–1.3)
MONOCYTES NFR BLD AUTO: 11.5 %
NEUTROPHILS NFR BLD AUTO: 78.3 %
NRBC BLD AUTO-RTO: 0 /100 WBC
PLATELET # BLD AUTO: 234 THOU/MM3 (ref 130–400)
PMV BLD AUTO: 12 FL (ref 9.4–12.4)
RBC # BLD AUTO: 4.25 MILL/MM3 (ref 4.7–6.1)
SCAN OF BLOOD SMEAR: NORMAL
SEGMENTED NEUTROPHILS ABSOLUTE COUNT: 11.1 THOU/MM3 (ref 1.8–7.7)
TOXIC GRANULES BLD QL SMEAR: PRESENT
VARIANT LYMPHS BLD QL SMEAR: ABNORMAL %
WBC # BLD AUTO: 14.2 THOU/MM3 (ref 4.8–10.8)

## 2024-03-10 PROCEDURE — 36415 COLL VENOUS BLD VENIPUNCTURE: CPT

## 2024-03-10 PROCEDURE — 6370000000 HC RX 637 (ALT 250 FOR IP): Performed by: SURGERY

## 2024-03-10 PROCEDURE — 85025 COMPLETE CBC W/AUTO DIFF WBC: CPT

## 2024-03-10 PROCEDURE — 6360000002 HC RX W HCPCS: Performed by: SURGERY

## 2024-03-10 PROCEDURE — 2580000003 HC RX 258: Performed by: SURGERY

## 2024-03-10 PROCEDURE — 1200000000 HC SEMI PRIVATE

## 2024-03-10 PROCEDURE — C9113 INJ PANTOPRAZOLE SODIUM, VIA: HCPCS | Performed by: SURGERY

## 2024-03-10 PROCEDURE — 99024 POSTOP FOLLOW-UP VISIT: CPT | Performed by: SURGERY

## 2024-03-10 RX ADMIN — KETOROLAC TROMETHAMINE 30 MG: 30 INJECTION INTRAMUSCULAR; INTRAVENOUS at 23:10

## 2024-03-10 RX ADMIN — SODIUM CHLORIDE: 9 INJECTION, SOLUTION INTRAVENOUS at 14:54

## 2024-03-10 RX ADMIN — PIPERACILLIN AND TAZOBACTAM 3375 MG: 3; .375 INJECTION, POWDER, LYOPHILIZED, FOR SOLUTION INTRAVENOUS at 19:54

## 2024-03-10 RX ADMIN — HYDROMORPHONE HYDROCHLORIDE 1 MG: 1 INJECTION, SOLUTION INTRAMUSCULAR; INTRAVENOUS; SUBCUTANEOUS at 17:21

## 2024-03-10 RX ADMIN — HYDROMORPHONE HYDROCHLORIDE 1 MG: 1 INJECTION, SOLUTION INTRAMUSCULAR; INTRAVENOUS; SUBCUTANEOUS at 11:06

## 2024-03-10 RX ADMIN — PANTOPRAZOLE SODIUM 40 MG: 40 INJECTION, POWDER, FOR SOLUTION INTRAVENOUS at 08:09

## 2024-03-10 RX ADMIN — HYDROMORPHONE HYDROCHLORIDE 1 MG: 1 INJECTION, SOLUTION INTRAMUSCULAR; INTRAVENOUS; SUBCUTANEOUS at 05:09

## 2024-03-10 RX ADMIN — PIPERACILLIN AND TAZOBACTAM 3375 MG: 3; .375 INJECTION, POWDER, LYOPHILIZED, FOR SOLUTION INTRAVENOUS at 05:08

## 2024-03-10 RX ADMIN — ONDANSETRON 4 MG: 2 INJECTION INTRAMUSCULAR; INTRAVENOUS at 08:09

## 2024-03-10 RX ADMIN — KETOROLAC TROMETHAMINE 30 MG: 30 INJECTION INTRAMUSCULAR; INTRAVENOUS at 04:09

## 2024-03-10 RX ADMIN — KETOROLAC TROMETHAMINE 30 MG: 30 INJECTION INTRAMUSCULAR; INTRAVENOUS at 10:11

## 2024-03-10 RX ADMIN — ENOXAPARIN SODIUM 30 MG: 100 INJECTION SUBCUTANEOUS at 08:10

## 2024-03-10 RX ADMIN — ONDANSETRON 4 MG: 2 INJECTION INTRAMUSCULAR; INTRAVENOUS at 18:24

## 2024-03-10 RX ADMIN — HYDROMORPHONE HYDROCHLORIDE 1 MG: 1 INJECTION, SOLUTION INTRAMUSCULAR; INTRAVENOUS; SUBCUTANEOUS at 20:26

## 2024-03-10 RX ADMIN — HYDROMORPHONE HYDROCHLORIDE 1 MG: 1 INJECTION, SOLUTION INTRAMUSCULAR; INTRAVENOUS; SUBCUTANEOUS at 14:08

## 2024-03-10 RX ADMIN — KETOROLAC TROMETHAMINE 30 MG: 30 INJECTION INTRAMUSCULAR; INTRAVENOUS at 15:43

## 2024-03-10 RX ADMIN — HYDROMORPHONE HYDROCHLORIDE 1 MG: 1 INJECTION, SOLUTION INTRAMUSCULAR; INTRAVENOUS; SUBCUTANEOUS at 08:09

## 2024-03-10 RX ADMIN — PIPERACILLIN AND TAZOBACTAM 3375 MG: 3; .375 INJECTION, POWDER, LYOPHILIZED, FOR SOLUTION INTRAVENOUS at 11:41

## 2024-03-10 RX ADMIN — NALOXEGOL OXALATE 25 MG: 25 TABLET, FILM COATED ORAL at 08:09

## 2024-03-10 RX ADMIN — ENOXAPARIN SODIUM 30 MG: 100 INJECTION SUBCUTANEOUS at 20:25

## 2024-03-10 ASSESSMENT — PAIN - FUNCTIONAL ASSESSMENT
PAIN_FUNCTIONAL_ASSESSMENT: ACTIVITIES ARE NOT PREVENTED

## 2024-03-10 ASSESSMENT — PAIN SCALES - GENERAL
PAINLEVEL_OUTOF10: 6
PAINLEVEL_OUTOF10: 7
PAINLEVEL_OUTOF10: 5
PAINLEVEL_OUTOF10: 6
PAINLEVEL_OUTOF10: 5
PAINLEVEL_OUTOF10: 6
PAINLEVEL_OUTOF10: 7

## 2024-03-10 ASSESSMENT — PAIN DESCRIPTION - ORIENTATION
ORIENTATION: RIGHT;LEFT
ORIENTATION: MID
ORIENTATION: MID;RIGHT;LEFT
ORIENTATION: RIGHT;LEFT
ORIENTATION: LEFT;MID
ORIENTATION: MID;LEFT;RIGHT
ORIENTATION: RIGHT;LEFT;MID

## 2024-03-10 ASSESSMENT — PAIN DESCRIPTION - DESCRIPTORS
DESCRIPTORS: SORE;ACHING
DESCRIPTORS: ACHING
DESCRIPTORS: ACHING;SORE
DESCRIPTORS: SORE;ACHING;DISCOMFORT
DESCRIPTORS: SORE;ACHING
DESCRIPTORS: ACHING
DESCRIPTORS: SORE;ACHING;DISCOMFORT

## 2024-03-10 ASSESSMENT — PAIN DESCRIPTION - LOCATION
LOCATION: ABDOMEN

## 2024-03-10 ASSESSMENT — PAIN DESCRIPTION - PAIN TYPE: TYPE: SURGICAL PAIN

## 2024-03-10 NOTE — PLAN OF CARE
Patient educated on how to use incentive spirometer. Patient verbalized understanding and demonstrated proper use. Emphasized importance and usage of device, with coughing and deep breathing every 2 hours while awake.     Problem: Pain  Goal: Verbalizes/displays adequate comfort level or baseline comfort level  3/10/2024 0318 by Rizwana Hines RN  Outcome: Progressing  Flowsheets (Taken 3/9/2024 1930)  Verbalizes/displays adequate comfort level or baseline comfort level:   Encourage patient to monitor pain and request assistance   Assess pain using appropriate pain scale   Administer analgesics based on type and severity of pain and evaluate response   Implement non-pharmacological measures as appropriate and evaluate response     Problem: Skin/Tissue Integrity - Adult  Goal: Incisions, wounds, or drain sites healing without S/S of infection  Outcome: Progressing  Flowsheets (Taken 3/9/2024 1930)  Incisions, Wounds, or Drain Sites Healing Without Sign and Symptoms of Infection:   ADMISSION and DAILY: Assess and document risk factors for pressure ulcer development   TWICE DAILY: Assess and document skin integrity   TWICE DAILY: Assess and document dressing/incision, wound bed, drain sites and surrounding tissue   Implement wound care per orders     Problem: Genitourinary - Adult  Goal: Urinary catheter remains patent  Outcome: Progressing  Flowsheets (Taken 3/9/2024 1930)  Urinary catheter remains patent: Assess patency of urinary catheter     Problem: Infection - Adult  Goal: Absence of infection during hospitalization  Outcome: Progressing  Flowsheets (Taken 3/9/2024 1930)  Absence of infection during hospitalization:   Assess and monitor for signs and symptoms of infection   Monitor lab/diagnostic results   Monitor all insertion sites i.e., indwelling lines, tubes and drains   Administer medications as ordered   Instruct and encourage patient and family to use good hand hygiene technique     Problem: Safety -

## 2024-03-10 NOTE — PROGRESS NOTES
Wale Fierro MD  [facility]  General Surgery Postoperative Progress Note    Pt Name: Noe Moctezuma  Medical Record Number: [id number]  Date of Birth 1975   Today's Date: 3/10/2024    CC:No chief complaint on file.      ASSESSMENT   POD # 2  HD # 2  PLAN   Allow ice chips sips of water popsicle  Leave IV at current rate  Remove kim today  Changed abd dressing today, alisson in place  White count stable at 14, on Zosyn  Continue broad-spectrum antibiotics  Lovenox and SCDs for DVT prophylaxis  SUBJECTIVE   Noe is doing well. He denies any nausea or vomiting, has a little gas in bag but no stool   via the ostomy. He is tolerating a Diet NPO Exceptions are: Ice Chips, Sips of Water with Meds, Popsicles, Sips of Clear Liquids. His pain is well controlled on current medications.  CURRENT MEDICATIONS   Scheduled Meds:   pantoprazole  40 mg IntraVENous Daily    piperacillin-tazobactam  3,375 mg IntraVENous Q8H    sodium chloride flush  5-40 mL IntraVENous 2 times per day    naloxegol  25 mg Oral Daily    ketorolac  30 mg IntraVENous Q6H    enoxaparin  30 mg SubCUTAneous BID     Continuous Infusions:   sodium chloride 150 mL/hr at 24 2227    sodium chloride       PRN Meds:.sodium chloride flush, sodium chloride, HYDROmorphone, ondansetron **OR** ondansetron  ROS:   History obtained from chart review and the patient, General ROS:   OBJECTIVE   CURRENT VITALS:  temperature is 98.1 °F (36.7 °C). His blood pressure is 112/75 and his pulse is 84. His respiration is 19 and oxygen saturation is 93%.  There is no height or weight on file to calculate BMI.  Temperature Range (24h):Temp: 98.1 °F (36.7 °C) Temp  Av.2 °F (36.8 °C)  Min: 97.6 °F (36.4 °C)  Max: 98.9 °F (37.2 °C)  BP Range (24h): Systolic (24hrs), Av , Min:100 , Max:112     Diastolic (24hrs), Av, Min:60, Max:75    Pulse Range (24h): Pulse  Av.3  Min: 79  Max: 84  Respiration Range (24h): Resp  Av.2  Min: 16  Max:  20  Current Pulse Ox (24h):  SpO2: 93 %  Pulse Ox Range (24h):  SpO2  Av %  Min: 93 %  Max: 95 %  Oxygen Amount and Delivery: O2 Flow Rate (L/min): 2 L/min  Incentive Spirometry Tx:       Achieved Volume (mL): 1500 mL  In: 3410.5 [I.V.:3275.8]  Out: 3825 [Urine:1550; Drains:425]  Closed/Suction Drain RLQ Bulb-Output (ml): 60 ml  Closed/Suction Drain Left LLQ Bulb-Output (ml): 30 ml  Date 03/10/24 0000 - 03/10/24 2359   Shift 2007-1710 6400-4993 3958-1008 24 Hour Total   INTAKE   P.O. 0   0   Shift Total 0   0   OUTPUT   Urine 200   200   Emesis/NG output 400   400   Shift Total 600   600   Weight (kg)         PHYSICAL EXAM     CONSTITUTIONAL: Alert and oriented times 3, no acute distress and cooperative to examination with proper mood and affect.  .  ABDOMEN: Normal shape. large midline scar and drains both lower quadrants scar(s) ostomy left upper/mid viable, less edematous a little gas in bag.  Drains fluid is serous appearing  .   EXTREMITIES: no cyanosis, no clubbing, and no edema      LABS     Recent Labs     24  1217 24  0502 03/10/24  0451   WBC 7.0 14.8* 14.2*   HGB 16.2 15.3 12.6*   HCT 46.4 45.6 37.8*    272 234    141  --    K 4.0 4.7  --     104  --    CO2 22* 24  --    BUN 15 19  --    CREATININE 1.2 1.3*  --    CALCIUM 8.8 8.7  --       No results for input(s): \"PTT\", \"INR\" in the last 72 hours.    Invalid input(s): \"PT\"  No results for input(s): \"AST\", \"ALT\", \"BILITOT\", \"BILIDIR\", \"AMYLASE\", \"LIPASE\", \"LDH\", \"LACTA\" in the last 72 hours.  No results for input(s): \"TROPONINT\" in the last 72 hours.  RADIOLOGY     No orders to display     CT Abdomen Pelvis With IV Contrast ONLY (No Oral Contrast)    Result Date: 3/8/2024  1. There is moderate-to-large free intraperitoneal air.  Moderate ascites is present.  Greater than 2 weeks post surgery, these findings are suspicious for anastomotic leak versus residual postop changes. 2. Trace bilateral pleural effusions and

## 2024-03-11 ENCOUNTER — APPOINTMENT (OUTPATIENT)
Dept: GENERAL RADIOLOGY | Age: 49
DRG: 329 | End: 2024-03-11
Attending: SURGERY
Payer: COMMERCIAL

## 2024-03-11 PROBLEM — E44.0 MODERATE MALNUTRITION (HCC): Status: ACTIVE | Noted: 2024-03-11

## 2024-03-11 LAB
ANION GAP SERPL CALC-SCNC: 10 MEQ/L (ref 8–16)
BASOPHILS ABSOLUTE: 0 THOU/MM3 (ref 0–0.1)
BASOPHILS NFR BLD AUTO: 0.3 %
BUN SERPL-MCNC: 20 MG/DL (ref 7–22)
CALCIUM SERPL-MCNC: 8.8 MG/DL (ref 8.5–10.5)
CHLORIDE SERPL-SCNC: 108 MEQ/L (ref 98–111)
CO2 SERPL-SCNC: 26 MEQ/L (ref 23–33)
CREAT SERPL-MCNC: 0.9 MG/DL (ref 0.4–1.2)
DEPRECATED RDW RBC AUTO: 43.8 FL (ref 35–45)
EOSINOPHIL NFR BLD AUTO: 3.1 %
EOSINOPHILS ABSOLUTE: 0.5 THOU/MM3 (ref 0–0.4)
ERYTHROCYTE [DISTWIDTH] IN BLOOD BY AUTOMATED COUNT: 13.6 % (ref 11.5–14.5)
GFR SERPL CREATININE-BSD FRML MDRD: > 60 ML/MIN/1.73M2
GLUCOSE SERPL-MCNC: 81 MG/DL (ref 70–108)
HCT VFR BLD AUTO: 38.8 % (ref 42–52)
HGB BLD-MCNC: 12.9 GM/DL (ref 14–18)
IMM GRANULOCYTES # BLD AUTO: 0.12 THOU/MM3 (ref 0–0.07)
IMM GRANULOCYTES NFR BLD AUTO: 0.8 %
LYMPHOCYTES ABSOLUTE: 1.4 THOU/MM3 (ref 1–4.8)
LYMPHOCYTES NFR BLD AUTO: 9.1 %
MCH RBC QN AUTO: 29.7 PG (ref 26–33)
MCHC RBC AUTO-ENTMCNC: 33.2 GM/DL (ref 32.2–35.5)
MCV RBC AUTO: 89.2 FL (ref 80–94)
MONOCYTES ABSOLUTE: 1.3 THOU/MM3 (ref 0.4–1.3)
MONOCYTES NFR BLD AUTO: 8.5 %
NEUTROPHILS NFR BLD AUTO: 78.2 %
NRBC BLD AUTO-RTO: 0 /100 WBC
PLATELET # BLD AUTO: 258 THOU/MM3 (ref 130–400)
PMV BLD AUTO: 11.6 FL (ref 9.4–12.4)
POTASSIUM SERPL-SCNC: 4.2 MEQ/L (ref 3.5–5.2)
RBC # BLD AUTO: 4.35 MILL/MM3 (ref 4.7–6.1)
SEGMENTED NEUTROPHILS ABSOLUTE COUNT: 11.9 THOU/MM3 (ref 1.8–7.7)
SODIUM SERPL-SCNC: 144 MEQ/L (ref 135–145)
WBC # BLD AUTO: 15.2 THOU/MM3 (ref 4.8–10.8)

## 2024-03-11 PROCEDURE — 80048 BASIC METABOLIC PNL TOTAL CA: CPT

## 2024-03-11 PROCEDURE — 6360000002 HC RX W HCPCS: Performed by: SURGERY

## 2024-03-11 PROCEDURE — 99024 POSTOP FOLLOW-UP VISIT: CPT | Performed by: NURSE PRACTITIONER

## 2024-03-11 PROCEDURE — 74018 RADEX ABDOMEN 1 VIEW: CPT

## 2024-03-11 PROCEDURE — APPSS30 APP SPLIT SHARED TIME 16-30 MINUTES: Performed by: NURSE PRACTITIONER

## 2024-03-11 PROCEDURE — 36415 COLL VENOUS BLD VENIPUNCTURE: CPT

## 2024-03-11 PROCEDURE — 2580000003 HC RX 258: Performed by: SURGERY

## 2024-03-11 PROCEDURE — 6370000000 HC RX 637 (ALT 250 FOR IP): Performed by: SURGERY

## 2024-03-11 PROCEDURE — 1200000000 HC SEMI PRIVATE

## 2024-03-11 PROCEDURE — C9113 INJ PANTOPRAZOLE SODIUM, VIA: HCPCS | Performed by: SURGERY

## 2024-03-11 PROCEDURE — 85025 COMPLETE CBC W/AUTO DIFF WBC: CPT

## 2024-03-11 RX ADMIN — ONDANSETRON 4 MG: 2 INJECTION INTRAMUSCULAR; INTRAVENOUS at 01:12

## 2024-03-11 RX ADMIN — HYDROMORPHONE HYDROCHLORIDE 1 MG: 1 INJECTION, SOLUTION INTRAMUSCULAR; INTRAVENOUS; SUBCUTANEOUS at 06:49

## 2024-03-11 RX ADMIN — SODIUM CHLORIDE, PRESERVATIVE FREE 10 ML: 5 INJECTION INTRAVENOUS at 07:44

## 2024-03-11 RX ADMIN — PANTOPRAZOLE SODIUM 40 MG: 40 INJECTION, POWDER, FOR SOLUTION INTRAVENOUS at 07:44

## 2024-03-11 RX ADMIN — PIPERACILLIN AND TAZOBACTAM 3375 MG: 3; .375 INJECTION, POWDER, LYOPHILIZED, FOR SOLUTION INTRAVENOUS at 04:00

## 2024-03-11 RX ADMIN — HYDROMORPHONE HYDROCHLORIDE 1 MG: 1 INJECTION, SOLUTION INTRAMUSCULAR; INTRAVENOUS; SUBCUTANEOUS at 16:48

## 2024-03-11 RX ADMIN — PIPERACILLIN AND TAZOBACTAM 3375 MG: 3; .375 INJECTION, POWDER, LYOPHILIZED, FOR SOLUTION INTRAVENOUS at 11:31

## 2024-03-11 RX ADMIN — KETOROLAC TROMETHAMINE 30 MG: 30 INJECTION INTRAMUSCULAR; INTRAVENOUS at 16:10

## 2024-03-11 RX ADMIN — HYDROMORPHONE HYDROCHLORIDE 1 MG: 1 INJECTION, SOLUTION INTRAMUSCULAR; INTRAVENOUS; SUBCUTANEOUS at 01:12

## 2024-03-11 RX ADMIN — KETOROLAC TROMETHAMINE 30 MG: 30 INJECTION INTRAMUSCULAR; INTRAVENOUS at 09:32

## 2024-03-11 RX ADMIN — NALOXEGOL OXALATE 25 MG: 25 TABLET, FILM COATED ORAL at 07:44

## 2024-03-11 RX ADMIN — KETOROLAC TROMETHAMINE 30 MG: 30 INJECTION INTRAMUSCULAR; INTRAVENOUS at 04:01

## 2024-03-11 RX ADMIN — KETOROLAC TROMETHAMINE 30 MG: 30 INJECTION INTRAMUSCULAR; INTRAVENOUS at 23:25

## 2024-03-11 RX ADMIN — HYDROMORPHONE HYDROCHLORIDE 1 MG: 1 INJECTION, SOLUTION INTRAMUSCULAR; INTRAVENOUS; SUBCUTANEOUS at 10:30

## 2024-03-11 RX ADMIN — ONDANSETRON 4 MG: 2 INJECTION INTRAMUSCULAR; INTRAVENOUS at 07:44

## 2024-03-11 RX ADMIN — SODIUM CHLORIDE: 9 INJECTION, SOLUTION INTRAVENOUS at 03:59

## 2024-03-11 RX ADMIN — ONDANSETRON 4 MG: 2 INJECTION INTRAMUSCULAR; INTRAVENOUS at 14:00

## 2024-03-11 RX ADMIN — ENOXAPARIN SODIUM 30 MG: 100 INJECTION SUBCUTANEOUS at 19:58

## 2024-03-11 RX ADMIN — ONDANSETRON 4 MG: 2 INJECTION INTRAMUSCULAR; INTRAVENOUS at 19:57

## 2024-03-11 RX ADMIN — ENOXAPARIN SODIUM 30 MG: 100 INJECTION SUBCUTANEOUS at 07:44

## 2024-03-11 RX ADMIN — PIPERACILLIN AND TAZOBACTAM 3375 MG: 3; .375 INJECTION, POWDER, LYOPHILIZED, FOR SOLUTION INTRAVENOUS at 20:19

## 2024-03-11 RX ADMIN — HYDROMORPHONE HYDROCHLORIDE 1 MG: 1 INJECTION, SOLUTION INTRAMUSCULAR; INTRAVENOUS; SUBCUTANEOUS at 13:47

## 2024-03-11 ASSESSMENT — PAIN DESCRIPTION - LOCATION
LOCATION: ABDOMEN

## 2024-03-11 ASSESSMENT — PAIN SCALES - GENERAL
PAINLEVEL_OUTOF10: 5
PAINLEVEL_OUTOF10: 4
PAINLEVEL_OUTOF10: 5
PAINLEVEL_OUTOF10: 5
PAINLEVEL_OUTOF10: 7
PAINLEVEL_OUTOF10: 5
PAINLEVEL_OUTOF10: 6
PAINLEVEL_OUTOF10: 5
PAINLEVEL_OUTOF10: 5

## 2024-03-11 ASSESSMENT — PAIN - FUNCTIONAL ASSESSMENT
PAIN_FUNCTIONAL_ASSESSMENT: ACTIVITIES ARE NOT PREVENTED

## 2024-03-11 ASSESSMENT — PAIN DESCRIPTION - DESCRIPTORS
DESCRIPTORS: SORE;ACHING;DISCOMFORT
DESCRIPTORS: ACHING;DISCOMFORT
DESCRIPTORS: SORE;ACHING
DESCRIPTORS: ACHING;DISCOMFORT
DESCRIPTORS: SORE;ACHING;DISCOMFORT
DESCRIPTORS: ACHING;DISCOMFORT
DESCRIPTORS: ACHING

## 2024-03-11 ASSESSMENT — PAIN DESCRIPTION - ORIENTATION
ORIENTATION: MID
ORIENTATION: MID
ORIENTATION: RIGHT
ORIENTATION: MID
ORIENTATION: RIGHT;LEFT
ORIENTATION: RIGHT;LEFT;MID
ORIENTATION: RIGHT;LEFT
ORIENTATION: RIGHT;LEFT

## 2024-03-11 ASSESSMENT — PAIN DESCRIPTION - FREQUENCY: FREQUENCY: CONTINUOUS

## 2024-03-11 ASSESSMENT — PAIN DESCRIPTION - ONSET: ONSET: ON-GOING

## 2024-03-11 ASSESSMENT — PAIN DESCRIPTION - PAIN TYPE: TYPE: SURGICAL PAIN

## 2024-03-11 NOTE — PROGRESS NOTES
General Surgery  Madai Vitale CNP APRN on behalf of   Dr. Covington  Postoperative Progress Note    Pt Name: Noe Moctezuma  Medical Record Number: [id number]  Date of Birth 1975   Today's Date: 3/11/2024    ASSESSMENT   POD # 3  S/p  Exploratory laparotomy, Takedown and resection of a leaking anastomosis with diverting colostomy, Fluorescein intraoperative evaluation of the right colon and remaining transverse colon for viability,  Intra-abdominal lavage with 4000 cc of fluid and 500 cc of irricept, Gross Fecal peritonitis present at the time of surgery PATOS  Postoperative pain as expected  Leukocytosis secondary to #1  NG tube LIWS  Wick dressings midline incision   Low grade fevers  Flatus in bag, no bowel function   PLAN   Continue NPO  Allow ice chips sips of water popsicle  Leave IV at current rate  Adequate urine output   Changed abd dressing today, alisson in place  White count slight increase 15.2,  on Zosyn  Continue broad-spectrum antibiotics  Lovenox and SCDs for DVT prophylaxis  GI prophylaxis  Analgesia and antiemetics as ordered  Wound and ostomy to assist with bag changes and education   SUBJECTIVE   Noe is doing pkay, He denies any nausea or vomiting, has a little gas in bag but no stool  via the ostomy. He is tolerating a Diet NPO Exceptions are: Ice Chips, Sips of Water with Meds, Popsicles, Sips of Clear Liquids. His pain is well controlled on current medications. He has been OOB and in halls.  NG tube to LIWS.  Dressing changed, wick dressings in place. Answered all questions   CURRENT MEDICATIONS   Scheduled Meds:   pantoprazole  40 mg IntraVENous Daily    piperacillin-tazobactam  3,375 mg IntraVENous Q8H    sodium chloride flush  5-40 mL IntraVENous 2 times per day    naloxegol  25 mg Oral Daily    ketorolac  30 mg IntraVENous Q6H    enoxaparin  30 mg SubCUTAneous BID     Continuous Infusions:   sodium chloride 125 mL/hr at 03/11/24 0359    sodium chloride       PRN Meds:.sodium  chloride flush, sodium chloride, HYDROmorphone, ondansetron **OR** ondansetron  ROS:   History obtained from chart review and the patient, General ROS:   OBJECTIVE   CURRENT VITALS:  weight is 103.9 kg (229 lb). His oral temperature is 99 °F (37.2 °C). His blood pressure is 142/97 (abnormal) and his pulse is 83. His respiration is 18 and oxygen saturation is 97%.  Body mass index is 31.06 kg/m².  Temperature Range (24h):Temp: 99 °F (37.2 °C) Temp  Av.3 °F (36.8 °C)  Min: 97.3 °F (36.3 °C)  Max: 99.1 °F (37.3 °C)  BP Range (24h): Systolic (24hrs), Av , Min:132 , Max:142     Diastolic (24hrs), Av, Min:83, Max:97    Pulse Range (24h): Pulse  Av.4  Min: 74  Max: 91  Respiration Range (24h): Resp  Av.1  Min: 16  Max: 18  Current Pulse Ox (24h):  SpO2: 97 %  Pulse Ox Range (24h):  SpO2  Av.6 %  Min: 93 %  Max: 97 %  Oxygen Amount and Delivery: O2 Flow Rate (L/min): 2 L/min  Incentive Spirometry Tx:       Achieved Volume (mL): 2000 mL  In: 4185 [P.O.:4185]  Out: 7000 [Urine:1475; Drains:125]  Closed/Suction Drain RLQ Bulb-Output (ml): 30 ml  Closed/Suction Drain Left LLQ Bulb-Output (ml): 20 ml  Date 24 0000 - 24 2359   Shift 7457-9474 2751-3598 8523-1054 24 Hour Total   INTAKE   P.O. 795 555  1350   Shift Total(mL/kg) 795(7.7) 555(5.3)  1350(13)   OUTPUT   Urine(mL/kg/hr) 600(0.7)   600   Emesis/NG output 1700   1700   Drains 50   50   Stool 30   30   Shift Total(mL/kg) 2380(22.9)   2380(22.9)   Weight (kg) 103.9 103.9 103.9 103.9     PHYSICAL EXAM     CONSTITUTIONAL: Alert and oriented times 3, no acute distress and cooperative to examination with proper mood and affect.  .  ABDOMEN: Normal shape. large midline scar and drains both lower quadrants scar(s) ostomy left upper/mid viable, less edematous a little gas in bag.  Drains fluid is serous appearing  .   EXTREMITIES: no cyanosis, no clubbing, and no edema      LABS     Recent Labs     24  1217 24  0502

## 2024-03-11 NOTE — DISCHARGE INSTRUCTIONS
Visit Discharge/ physician orders:    Supplies   Size   Order #     Coloplast Sensura [place]yth red flat flange Cut-to-fit [id number]   Coloplast Sensura Earl drainable pouch   [id number]   Brava Skin Barrier Wipes  [id number]   Brava Protective Ring  [id number]   Brava Stoma Powder  41509   Brava Elastic Barrier Strips  [id number]     Change your flange 1-2   times / week.    Application of two-piece Colostomy/Ileostomy Pouch:  Assemble supplies in order of application before removing pouch.  Remove worn pouching system.  Wash skin with warm water and wash cloth. Pat dry with towel. DO NOT USE SOAP/ WIPES!  Inspect chinedu-stomal skin for redness or irritation. If present, apply small amount of stoma powder to skin, dust off excess. (Brava Stoma Powder 24932)  Measure size of stoma with measuring guide. Get as close to size of stoma without leaving too much skin exposed.  Cut a hole in the flange to fit the size of the stoma.  Apply skin prep to the chinedu-stomal skin (including stoma powder, if used).  Remove paper backing from flange.  Apply protective ring to inner edge of flange OR to peristomal skin.   ONLY if pouching system has continued leaking, patient may benefit from use of NuHope adhesive (5272). Brush a thin layer of the adhesive with the included wand to the back of the flange and the chinedu-stomal skin. Allow to become tacky to touch before applying.  Center the flange around the stoma and apply.   Apply the pouch by aligning the white edge of the pouch sticker with the blue line on the flange.  Run fingers around the back of flange and pouch to ensure the two pieces are adhered.   Apply barrier extenders around outer edge of flange (half on the flange, half on the skin).  Empty when 1/3 full.    Follow up with ALL Naranjo at 2 and 6 weeks post-op.  [facility]  [address] [place], [place] [id number] [phone]    Call surgeon or seek emergency medical care if the following symptoms  is tolerable for you     Narcotics cause constipation and we recommend taking a colace daily and Miralax if needed to help reduce the risk of constipation    Increasing water intake if no restrictions will also help eliminate constipation    Ambulation 4 times a day 15 minute each time will help reduce pain each day and help relieve constipation      WOUND/DRESSING INSTRUCTIONS:  Always ensure you and your care giver clean hands before and after caring for the  wound.  Keep dressing clean and dry, Change when soiled or wet.    Leave incision open to air if not draining   Allow steri-strips to fall off on their own.   Ice operative site for 20 minutes 4 times a day as needed     May wash over incision in shower daily,  but do not soak in a bath.      ABDOMINAL/LAPAROSCOPIC SURGERY  [x]You are encouraged to get up and move around as this helps with the circulation and speeds up the healing process.  [x]Breath deeply and cough from time to time. This helps to clear your lungs and helps prevent pneumonia.  [x]Supporting your incision with a pillow or your hand helps to minimize discomfort and pain.  [x]Laparoscopic patients may develop shoulder pain in the first 48 hours from the gas used during the procedure.    FOLLOW-UP CARE. SPECIFICALLY WATCH FOR:   Fever over 101 degrees by mouth   Increased redness, warmth, hardness at operative site.   Blood soaked dressing (small amounts of oozing may be normal.)   Increased or progressive drainage from the surgical area   Inability to urinate or blood in the urine   Pain not relieved by the medications ordered   Persistent nausea and/or vomiting, unable to retain fluids.    FOLLOW-UP APPOINTMENT:  As scheduled     Call my office if you have any problem that concerns you [phone]. After hours, you can reach the answering service via the office phone number. IF YOU NEED IMMEDIATE ATTENTION, GO TO THE EMERGENCY ROOM AND YOUR DOCTOR WILL BE CONTACTED.    Prepared By:  Madai QUICK

## 2024-03-11 NOTE — PROGRESS NOTES
Comprehensive Nutrition Assessment    Type and Reason for Visit:  Initial, Consult (Patient Requested to Consult with Dietitian- Recent bowel resection, went home and came back with intestinal leak. new colostomy. expressed need for diet education.)    Nutrition Recommendations/Plan:   Recommend advance diet as GI function allows.  Will initiate ONS once FL diet or greater.  Encouraged small, frequent meals as tolerated.  Instructed pt. And wife on nutrition therapy for colostomies.  Provided written information, RD name and number.     Malnutrition Assessment:  Malnutrition Status:  Moderate malnutrition (03/11/24 1232)    Context:  Acute Illness     Findings of the 6 clinical characteristics of malnutrition:  Energy Intake:  50% or less of estimated energy requirements for 5 or more days  Weight Loss:   (-1.6% in 2.5 weeks)     Body Fat Loss:  Mild body fat loss Orbital   Muscle Mass Loss:  No significant muscle mass loss    Fluid Accumulation:  Unable to assess     Strength:  Not Performed    Nutrition Assessment:     Pt. severely malnourished AEB criteria listed above.  At risk for further nutritional compromise r/t altered GI function, recent diagnosis of colon cancer (colecotmy 2/28/24, colostomy 3/8) and underlying medical condition (hx diverticulitis, HLD).       Nutrition Related Findings:      Wound Type: Surgical Incision (3/8 Exploratory laparotomy, Takedown and resection of a leaking anastomosis with diverting colostomy; Gross Fecal peritonitis present at the time of surgery)       Current Nutrition Intake & Therapies:    Average Meal Intake: NPO  Pt. Report/Treatments/Miscellaneous: pt. Seen w/ wife; requesting diet education regarding new colostomy; states wants to do everything right so won't happen again; reports minimal po intake since discharge home 3/3; states only able to consume soup and only half a can; NPO since 3/8; denies any nausea or abdominal pain at present; states Zofran very

## 2024-03-11 NOTE — PLAN OF CARE
Problem: Discharge Planning  Goal: Discharge to home or other facility with appropriate resources  Outcome: Progressing  Flowsheets  Taken 3/11/2024 0937 by Magali Lal RN  Discharge to home or other facility with appropriate resources:   Identify barriers to discharge with patient and caregiver   Arrange for needed discharge resources and transportation as appropriate   Identify discharge learning needs (meds, wound care, etc)  Taken 3/10/2024 1945 by Rizwana Hines RN  Discharge to home or other facility with appropriate resources: Identify barriers to discharge with patient and caregiver     Problem: Pain  Goal: Verbalizes/displays adequate comfort level or baseline comfort level  Outcome: Progressing  Flowsheets  Taken 3/11/2024 0937 by Magali Lal RN  Verbalizes/displays adequate comfort level or baseline comfort level:   Encourage patient to monitor pain and request assistance   Assess pain using appropriate pain scale   Administer analgesics based on type and severity of pain and evaluate response   Implement non-pharmacological measures as appropriate and evaluate response  Taken 3/10/2024 1945 by Rizwana Hines RN  Verbalizes/displays adequate comfort level or baseline comfort level:   Encourage patient to monitor pain and request assistance   Administer analgesics based on type and severity of pain and evaluate response   Assess pain using appropriate pain scale   Implement non-pharmacological measures as appropriate and evaluate response     Problem: ABCDS Injury Assessment  Goal: Absence of physical injury  Outcome: Progressing  Flowsheets (Taken 3/11/2024 0937)  Absence of Physical Injury: Implement safety measures based on patient assessment     Problem: Skin/Tissue Integrity  Goal: Absence of new skin breakdown  Description: 1.  Monitor for areas of redness and/or skin breakdown  2.  Assess vascular access sites hourly  3.  Every 4-6 hours minimum:  Change oxygen saturation probe site  4.   No 0

## 2024-03-11 NOTE — PROGRESS NOTES
[facility] Wound Ostomy Continence Nurse  Ostomy Referral Progress Note      NAME:  Noe Moctezuma  MEDICAL RECORD NUMBER:  [id number]  AGE: 48 y.o.   GENDER:  male  :  1975  TODAY'S DATE:  3/11/2024    Subjective   Reason for [facility] Evaluation and Assessment: new colostomy on Friday      Noe Moctezuma is a 48 y.o. male referred by:   [] Physician/ PA/ APRN-CNP  [x] Nursing  [] Other:     New  Surgeon: Sri  Date of surgery: 3/8/24    Objective     Ajrred Risk Score Jarred Scale Score: 22    Assessment     Encounter: Present to patient room for new colostomy. Patient in bed. Wife at side. Assessment and photo to follow. One post-op piece pouching system removed. Cleansed stoma with warm wash cloth, pat dry. Skin prep applied to chinedu-stomal skin. Coloplast 2 piece red flange cut to fit to size, protective ring applied to inner edge of flange, centered over stoma, and applied. Pouch applied. Barrier extenders placed on outer edge of flange. Applied hands lightly over system to activate hydrocolloid. Educated patient step by step throughout pouching system application. Answered questions and concerns. Will plan to change pouching system and educate later this week. Highly recommend patient have home health assist with ostomy care after discharge. Pt and wife state they would like  care.  Ostomy educational information given to patient, with additional pouching systems in room. Will continue to follow. Bed in low, call light in reach.       OSTOMY ASSESSMENT:  Photo:    Ostomy Type: Colostomy  Ostomy location: LUQ  Size of stoma: 45mm  Height:  Protruding  Color: Beefy red  Mucocutaneous junction: intact  Peristomal skin: intact  Peristomal Creases: none  Output:Serosanguinous      Plan   Plan for Ostomy Care:    Ostomy Plan of Care  [x] Supplies/Instructions left in room  [] Patient using home supplies  [x] Current pouching system: Coloplast    Current Diet: Diet NPO Exceptions are: Ice Chips, Sips

## 2024-03-11 NOTE — CARE COORDINATION
3/11/24, 1:25 PM EDT    DISCHARGE ON GOING EVALUATION    Noe [facility] day: 3  Location: -09/009-A Reason for admit: Anastomotic leak of intestine [K91.89]   Procedure:   3/8 OR with Dr. Fierro: Exploratory laparotomy 2.  Takedown and resection of a leaking anastomosis with diverting colostomy  3.  Fluoroscein intraoperative evaluation of the right colon and remaining transverse colon for viability 4.  Intra-abdominal lavage with 4000 cc of fluid and 500 cc of irricept 5.  Gross Fecal peritonitis present at the time of surgery PATOS     Barriers to Discharge: General Surgery following. Dietician. Wound Ostomy. POD 3. NPO; NG to LIWS. Awaiting ostomy function to advance diet. Ambulating halls. Colostomy care. Drain care (R 90ml L 35ml) IVF@ 125. IV dilaudid prn. IV toradol q6h.   IV zofran prn. Movantik. IV zosyn q8h.     PCP: Iliana Mejia MD  Readmission Risk Score: 9.9%  Patient Goals/Plan/Treatment Preferences: Return home w/ wife. Prefers [facility] for new ostomy (need orders).     Post-acute (PAC) provider list was provided to patient. Patient was informed of their freedom to choose PAC provider. Discussed and offered to show the patient the relevant PAC Providers quality and resource use measures on Medicare Compare web site via computer based on patient's goals of care and treatment preferences. Questions regarding selection process were answered.

## 2024-03-11 NOTE — PLAN OF CARE
Patient educated on how to use incentive spirometer. Patient verbalized understanding and demonstrated proper use. Emphasized importance and usage of device, with coughing and deep breathing every 2 hours while awake.      Problem: Pain  Goal: Verbalizes/displays adequate comfort level or baseline comfort level  Outcome: Progressing  Verbalizes/displays adequate comfort level or baseline comfort level:   Encourage patient to monitor pain and request assistance   Assess pain using appropriate pain scale   Administer analgesics based on type and severity of pain and evaluate response   Implement non-pharmacological measures as appropriate and evaluate response     Problem: Skin/Tissue Integrity - Adult  Goal: Incisions, wounds, or drain sites healing without S/S of infection  Outcome: Progressing  Incisions, Wounds, or Drain Sites Healing Without Sign and Symptoms of Infection:   ADMISSION and DAILY: Assess and document risk factors for pressure ulcer development   TWICE DAILY: Assess and document skin integrity   TWICE DAILY: Assess and document dressing/incision, wound bed, drain sites and surrounding tissue   Implement wound care per orders     Problem: Genitourinary - Adult  Goal: Urinary catheter remains patent  Outcome: Progressing  Urinary catheter remains patent: Assess patency of urinary catheter     Problem: Infection - Adult  Goal: Absence of infection during hospitalization  Outcome: Progressing  Absence of infection during hospitalization:   Assess and monitor for signs and symptoms of infection   Monitor lab/diagnostic results   Monitor all insertion sites i.e., indwelling lines, tubes and drains   Administer medications as ordered   Instruct and encourage patient and family to use good hand hygiene technique     Problem: Safety - Adult  Goal: Free from fall injury  Outcome: Progressing   Fall assessment completed. Patient using call light appropriately to call for assistance with ambulation to bathroom.

## 2024-03-12 LAB
ANION GAP SERPL CALC-SCNC: 9 MEQ/L (ref 8–16)
BUN SERPL-MCNC: 17 MG/DL (ref 7–22)
CALCIUM SERPL-MCNC: 8.5 MG/DL (ref 8.5–10.5)
CHLORIDE SERPL-SCNC: 108 MEQ/L (ref 98–111)
CO2 SERPL-SCNC: 27 MEQ/L (ref 23–33)
CREAT SERPL-MCNC: 0.7 MG/DL (ref 0.4–1.2)
DEPRECATED RDW RBC AUTO: 43.7 FL (ref 35–45)
ERYTHROCYTE [DISTWIDTH] IN BLOOD BY AUTOMATED COUNT: 13.8 % (ref 11.5–14.5)
GFR SERPL CREATININE-BSD FRML MDRD: > 60 ML/MIN/1.73M2
GLUCOSE SERPL-MCNC: 91 MG/DL (ref 70–108)
HCT VFR BLD AUTO: 37.3 % (ref 42–52)
HGB BLD-MCNC: 12.5 GM/DL (ref 14–18)
MCH RBC QN AUTO: 29.3 PG (ref 26–33)
MCHC RBC AUTO-ENTMCNC: 33.5 GM/DL (ref 32.2–35.5)
MCV RBC AUTO: 87.4 FL (ref 80–94)
PLATELET # BLD AUTO: 318 THOU/MM3 (ref 130–400)
PMV BLD AUTO: 10.8 FL (ref 9.4–12.4)
POTASSIUM SERPL-SCNC: 3.5 MEQ/L (ref 3.5–5.2)
RBC # BLD AUTO: 4.27 MILL/MM3 (ref 4.7–6.1)
SODIUM SERPL-SCNC: 144 MEQ/L (ref 135–145)
WBC # BLD AUTO: 11.4 THOU/MM3 (ref 4.8–10.8)

## 2024-03-12 PROCEDURE — 6370000000 HC RX 637 (ALT 250 FOR IP): Performed by: NURSE PRACTITIONER

## 2024-03-12 PROCEDURE — 6360000002 HC RX W HCPCS: Performed by: SURGERY

## 2024-03-12 PROCEDURE — 6360000002 HC RX W HCPCS: Performed by: NURSE PRACTITIONER

## 2024-03-12 PROCEDURE — C9113 INJ PANTOPRAZOLE SODIUM, VIA: HCPCS | Performed by: SURGERY

## 2024-03-12 PROCEDURE — 2580000003 HC RX 258: Performed by: SURGERY

## 2024-03-12 PROCEDURE — APPSS30 APP SPLIT SHARED TIME 16-30 MINUTES: Performed by: NURSE PRACTITIONER

## 2024-03-12 PROCEDURE — 6370000000 HC RX 637 (ALT 250 FOR IP): Performed by: SURGERY

## 2024-03-12 PROCEDURE — 36415 COLL VENOUS BLD VENIPUNCTURE: CPT

## 2024-03-12 PROCEDURE — 80048 BASIC METABOLIC PNL TOTAL CA: CPT

## 2024-03-12 PROCEDURE — 99024 POSTOP FOLLOW-UP VISIT: CPT | Performed by: NURSE PRACTITIONER

## 2024-03-12 PROCEDURE — 85027 COMPLETE CBC AUTOMATED: CPT

## 2024-03-12 PROCEDURE — 2580000003 HC RX 258: Performed by: NURSE PRACTITIONER

## 2024-03-12 PROCEDURE — 1200000000 HC SEMI PRIVATE

## 2024-03-12 RX ORDER — ACETAMINOPHEN 325 MG/1
650 TABLET ORAL EVERY 4 HOURS PRN
Status: DISCONTINUED | OUTPATIENT
Start: 2024-03-12 | End: 2024-03-18 | Stop reason: HOSPADM

## 2024-03-12 RX ORDER — OXYCODONE HYDROCHLORIDE 5 MG/1
5 TABLET ORAL EVERY 4 HOURS PRN
Status: DISCONTINUED | OUTPATIENT
Start: 2024-03-12 | End: 2024-03-18 | Stop reason: HOSPADM

## 2024-03-12 RX ORDER — HYDRALAZINE HYDROCHLORIDE 20 MG/ML
10 INJECTION INTRAMUSCULAR; INTRAVENOUS EVERY 6 HOURS PRN
Status: DISCONTINUED | OUTPATIENT
Start: 2024-03-12 | End: 2024-03-18 | Stop reason: HOSPADM

## 2024-03-12 RX ADMIN — PANTOPRAZOLE SODIUM 40 MG: 40 INJECTION, POWDER, FOR SOLUTION INTRAVENOUS at 09:17

## 2024-03-12 RX ADMIN — ONDANSETRON 4 MG: 2 INJECTION INTRAMUSCULAR; INTRAVENOUS at 03:36

## 2024-03-12 RX ADMIN — PIPERACILLIN AND TAZOBACTAM 3375 MG: 3; .375 INJECTION, POWDER, LYOPHILIZED, FOR SOLUTION INTRAVENOUS at 05:12

## 2024-03-12 RX ADMIN — SODIUM CHLORIDE: 9 INJECTION, SOLUTION INTRAVENOUS at 00:26

## 2024-03-12 RX ADMIN — KETOROLAC TROMETHAMINE 30 MG: 30 INJECTION INTRAMUSCULAR; INTRAVENOUS at 15:58

## 2024-03-12 RX ADMIN — SODIUM CHLORIDE: 9 INJECTION, SOLUTION INTRAVENOUS at 13:52

## 2024-03-12 RX ADMIN — HYDROMORPHONE HYDROCHLORIDE 1 MG: 1 INJECTION, SOLUTION INTRAMUSCULAR; INTRAVENOUS; SUBCUTANEOUS at 03:36

## 2024-03-12 RX ADMIN — ENOXAPARIN SODIUM 30 MG: 100 INJECTION SUBCUTANEOUS at 19:35

## 2024-03-12 RX ADMIN — ENOXAPARIN SODIUM 30 MG: 100 INJECTION SUBCUTANEOUS at 09:17

## 2024-03-12 RX ADMIN — KETOROLAC TROMETHAMINE 30 MG: 30 INJECTION INTRAMUSCULAR; INTRAVENOUS at 22:12

## 2024-03-12 RX ADMIN — SODIUM CHLORIDE, PRESERVATIVE FREE 10 ML: 5 INJECTION INTRAVENOUS at 19:36

## 2024-03-12 RX ADMIN — HYDROMORPHONE HYDROCHLORIDE 1 MG: 1 INJECTION, SOLUTION INTRAMUSCULAR; INTRAVENOUS; SUBCUTANEOUS at 06:47

## 2024-03-12 RX ADMIN — OXYCODONE 5 MG: 5 TABLET ORAL at 15:17

## 2024-03-12 RX ADMIN — HYDROMORPHONE HYDROCHLORIDE 1 MG: 1 INJECTION, SOLUTION INTRAMUSCULAR; INTRAVENOUS; SUBCUTANEOUS at 00:25

## 2024-03-12 RX ADMIN — ONDANSETRON 4 MG: 2 INJECTION INTRAMUSCULAR; INTRAVENOUS at 19:35

## 2024-03-12 RX ADMIN — KETOROLAC TROMETHAMINE 30 MG: 30 INJECTION INTRAMUSCULAR; INTRAVENOUS at 10:25

## 2024-03-12 RX ADMIN — HYDROMORPHONE HYDROCHLORIDE 0.5 MG: 1 INJECTION, SOLUTION INTRAMUSCULAR; INTRAVENOUS; SUBCUTANEOUS at 20:54

## 2024-03-12 RX ADMIN — PIPERACILLIN AND TAZOBACTAM 3375 MG: 3; .375 INJECTION, POWDER, LYOPHILIZED, FOR SOLUTION INTRAVENOUS at 11:24

## 2024-03-12 RX ADMIN — HYDROMORPHONE HYDROCHLORIDE 0.5 MG: 1 INJECTION, SOLUTION INTRAMUSCULAR; INTRAVENOUS; SUBCUTANEOUS at 15:58

## 2024-03-12 RX ADMIN — PIPERACILLIN AND TAZOBACTAM 3375 MG: 3; .375 INJECTION, POWDER, LYOPHILIZED, FOR SOLUTION INTRAVENOUS at 19:45

## 2024-03-12 RX ADMIN — OXYCODONE 5 MG: 5 TABLET ORAL at 19:35

## 2024-03-12 RX ADMIN — KETOROLAC TROMETHAMINE 30 MG: 30 INJECTION INTRAMUSCULAR; INTRAVENOUS at 05:03

## 2024-03-12 RX ADMIN — SODIUM CHLORIDE, PRESERVATIVE FREE 10 ML: 5 INJECTION INTRAVENOUS at 09:13

## 2024-03-12 RX ADMIN — NALOXEGOL OXALATE 25 MG: 25 TABLET, FILM COATED ORAL at 09:17

## 2024-03-12 RX ADMIN — OXYCODONE 5 MG: 5 TABLET ORAL at 10:23

## 2024-03-12 ASSESSMENT — PAIN DESCRIPTION - ORIENTATION
ORIENTATION: RIGHT;LEFT;MID
ORIENTATION: MID;LEFT;RIGHT;LOWER
ORIENTATION: MID;LEFT;LOWER;RIGHT
ORIENTATION: RIGHT;LEFT;MID
ORIENTATION: LEFT

## 2024-03-12 ASSESSMENT — PAIN DESCRIPTION - LOCATION
LOCATION: ABDOMEN

## 2024-03-12 ASSESSMENT — PAIN SCALES - GENERAL
PAINLEVEL_OUTOF10: 8
PAINLEVEL_OUTOF10: 7
PAINLEVEL_OUTOF10: 4
PAINLEVEL_OUTOF10: 7
PAINLEVEL_OUTOF10: 8
PAINLEVEL_OUTOF10: 5
PAINLEVEL_OUTOF10: 4
PAINLEVEL_OUTOF10: 4
PAINLEVEL_OUTOF10: 3
PAINLEVEL_OUTOF10: 5

## 2024-03-12 ASSESSMENT — PAIN - FUNCTIONAL ASSESSMENT
PAIN_FUNCTIONAL_ASSESSMENT: ACTIVITIES ARE NOT PREVENTED

## 2024-03-12 ASSESSMENT — PAIN DESCRIPTION - PAIN TYPE: TYPE: SURGICAL PAIN

## 2024-03-12 ASSESSMENT — PAIN DESCRIPTION - DESCRIPTORS
DESCRIPTORS: ACHING;DISCOMFORT
DESCRIPTORS: ACHING;CRAMPING
DESCRIPTORS: DISCOMFORT
DESCRIPTORS: ACHING;CRAMPING
DESCRIPTORS: CRAMPING
DESCRIPTORS: CRAMPING;ACHING;TENDER
DESCRIPTORS: DISCOMFORT

## 2024-03-12 ASSESSMENT — PAIN DESCRIPTION - DIRECTION: RADIATING_TOWARDS: RIGHT ABDOMEN

## 2024-03-12 ASSESSMENT — PAIN DESCRIPTION - FREQUENCY: FREQUENCY: CONTINUOUS

## 2024-03-12 ASSESSMENT — PAIN DESCRIPTION - ONSET: ONSET: ON-GOING

## 2024-03-12 NOTE — CARE COORDINATION
3/12/24, 12:18 PM EDT    DISCHARGE ON GOING EVALUATION    [facility] day: 4  Location: -09/009-A Reason for admit: Anastomotic leak of intestine [K91.89]   Procedure:   3/8 OR with Dr. Fierro: Exploratory laparotomy 2.  Takedown and resection of a leaking anastomosis with diverting colostomy  3.  Fluoroscein intraoperative evaluation of the right colon and remaining transverse colon for viability 4.  Intra-abdominal lavage with 4000 cc of fluid and 500 cc of irricept 5.  Gross Fecal peritonitis present at the time of surgery PATOS      Barriers to Discharge: General Surgery following. Dietician. Wound Ostomy. POD 4. Ostomy started functioning over HS. NG clamped this AM. Started on clear liquids. Ambulating halls. Colostomy care. Drain care (R 50ml L 25ml) IVF decreased to 75ml/hr. IV dilaudid prn. IV toradol q6h. Movantik. IV zofran prn. Roxicodone prn. IV zosyn q8h.     PCP: Iliana Mejia MD  Readmission Risk Score: 8.9%  Patient Goals/Plan/Treatment Preferences: Return home w/ wife. Prefers [facility] for new ostomy (need orders).

## 2024-03-12 NOTE — PROGRESS NOTES
03/12/24 1215   Encounter Summary   Encounter Overview/Reason  Initial Encounter;Spiritual/Emotional Needs   Service Provided For: Patient and family together   Referral/Consult From: Rounding   Support System Spouse   Last Encounter  03/12/24   Complexity of Encounter Moderate   Begin Time 1155   End Time  1215   Total Time Calculated 20 min   Spiritual/Emotional needs   Type Spiritual Support   Assessment/Intervention/Outcome   Assessment Calm;Coping   Intervention Active listening;Empowerment;Nurtured Hope;Prayer (assurance of)/[place];Sustaining Presence/Ministry of presence   Outcome Acceptance;Comfort;Coping;Encouraged;Optimistic     Assessment:  In my encounter with the 48 yr old patient the pt's spouse was supportively present. While rounding the unit 5K,  I provided spiritual care to patient and their family through conversation, I also came to assess their spiritual needs present. The pt was admitted due to laparotomy.     The pt was very optimistic and spoke highly of his art in God.     Interventions:  I provided, prayer, emotional support and words of comfort.  provided a listening presence and encouraged pt to share their beliefs and how they support him during their hospitalization.       Outcomes:  The patient was encouraged and didn't share any further spiritual needs at this time. The pt remains optimistic and hopeful. The pt shared that they were appreciative for the support.     Plan:  Chaplains will follow-up at a later time for assessment of any spiritual care needs present.

## 2024-03-12 NOTE — PLAN OF CARE
Problem: Discharge Planning  Goal: Discharge to home or other facility with appropriate resources  Outcome: Progressing  Flowsheets (Taken 3/12/2024 0904 by Jossie Rosales)  Discharge to home or other facility with appropriate resources: Identify barriers to discharge with patient and caregiver     Problem: Pain  Goal: Verbalizes/displays adequate comfort level or baseline comfort level  Outcome: Progressing     Problem: ABCDS Injury Assessment  Goal: Absence of physical injury  Outcome: Progressing     Problem: Skin/Tissue Integrity  Goal: Absence of new skin breakdown  Description: 1.  Monitor for areas of redness and/or skin breakdown  2.  Assess vascular access sites hourly  3.  Every 4-6 hours minimum:  Change oxygen saturation probe site  4.  Every 4-6 hours:  If on nasal continuous positive airway pressure, respiratory therapy assess nares and determine need for appliance change or resting period.  Outcome: Progressing     Problem: Gastrointestinal - Adult  Goal: Maintains or returns to baseline bowel function  Outcome: Progressing    Problem: Skin/Tissue Integrity - Adult  Goal: Incisions, wounds, or drain sites healing without S/S of infection  Outcome: Progressing    Problem: Infection - Adult  Goal: Absence of infection during hospitalization  Outcome: Progressing             Problem: Gastrointestinal - Adult  Goal: Establish and maintain optimal ostomy function  Outcome: Progressing

## 2024-03-12 NOTE — PROGRESS NOTES
General Surgery  Madai Vitale CNP APRN on behalf of   Dr. Covington  Postoperative Progress Note    Pt Name: Noe Moctezuma  Medical Record Number: [id number]  Date of Birth 1975   Today's Date: 3/12/2024    ASSESSMENT   POD # 4  S/p  Exploratory laparotomy, Takedown and resection of a leaking anastomosis with diverting colostomy, Fluorescein intraoperative evaluation of the right colon and remaining transverse colon for viability,  Intra-abdominal lavage with 4000 cc of fluid and 500 cc of irricept, Gross Fecal peritonitis present at the time of surgery PATOS  Postoperative pain as expected  Nausea with dilaudid   Leukocytosis secondary to #1 trending down 11.4  Wick dressings removed  Low grade fevers resolved  Bowel function returned   Hypertensive   Crepitus right lateral side   FINAL DIAGNOSIS:   Colon, anastomotic site, excision:   Benign colon tissue with prominent pericolonic fibrosis, fat necrosis,       and acute inflammation including acute serositis.   Negative for malignancy.   PLAN   Start clear liquids   Clamp Ng tube   Decrease IV fluid to 75ml/h  Adequate urine output   Changed abd dressing and removed alisson   White count slight increase 15.2,  on Zosyn  Continue broad-spectrum antibiotics  Lovenox and SCDs for DVT prophylaxis  GI prophylaxis  Labs daily monitor WBC   Analgesia and antiemetics as ordered adjusted   Wound and ostomy to assist with bag changes and education   SUBJECTIVE   Neo is doing okay, He has nausea with dilaudid, has stool in the ostomy pouch. He is tolerating sips and chips a ADULT DIET; Clear Liquid has been ordered.  His pain is well controlled on current medications. He has been OOB and in halls.  NG tube clamped. Dressing changed, wick dressings removed.  Answered all questions   CURRENT MEDICATIONS   Scheduled Meds:   pantoprazole  40 mg IntraVENous Daily    piperacillin-tazobactam  3,375 mg IntraVENous Q8H    sodium chloride flush  5-40 mL IntraVENous 2 times  workstation: [id number]    XR CHEST 1 VIEW    Result Date: 3/8/2024  Findings highly suspicious for pneumoperitoneum.  Advise correlation at CT abdomen/pelvis which is currently pending. Bibasilar patchy opacities of atelectasis or infiltrate. Cardiomegaly. Interpretation by Wale Pantoja D.O. Professional Interpretation by  Radiology Reading workstation: [id number]    Electronically signed by ALL Randolph CNP on 3/12/2024 at 9:41 AM

## 2024-03-13 ENCOUNTER — APPOINTMENT (OUTPATIENT)
Dept: CT IMAGING | Age: 49
DRG: 329 | End: 2024-03-13
Attending: SURGERY
Payer: COMMERCIAL

## 2024-03-13 LAB
DEPRECATED RDW RBC AUTO: 44.3 FL (ref 35–45)
ERYTHROCYTE [DISTWIDTH] IN BLOOD BY AUTOMATED COUNT: 13.9 % (ref 11.5–14.5)
HCT VFR BLD AUTO: 33.8 % (ref 42–52)
HGB BLD-MCNC: 11.3 GM/DL (ref 14–18)
MCH RBC QN AUTO: 29.4 PG (ref 26–33)
MCHC RBC AUTO-ENTMCNC: 33.4 GM/DL (ref 32.2–35.5)
MCV RBC AUTO: 87.8 FL (ref 80–94)
PLATELET # BLD AUTO: 298 THOU/MM3 (ref 130–400)
PMV BLD AUTO: 10.9 FL (ref 9.4–12.4)
RBC # BLD AUTO: 3.85 MILL/MM3 (ref 4.7–6.1)
WBC # BLD AUTO: 11.2 THOU/MM3 (ref 4.8–10.8)

## 2024-03-13 PROCEDURE — 6370000000 HC RX 637 (ALT 250 FOR IP): Performed by: NURSE PRACTITIONER

## 2024-03-13 PROCEDURE — 85027 COMPLETE CBC AUTOMATED: CPT

## 2024-03-13 PROCEDURE — C9113 INJ PANTOPRAZOLE SODIUM, VIA: HCPCS | Performed by: SURGERY

## 2024-03-13 PROCEDURE — 2580000003 HC RX 258: Performed by: NURSE PRACTITIONER

## 2024-03-13 PROCEDURE — 6360000002 HC RX W HCPCS: Performed by: SURGERY

## 2024-03-13 PROCEDURE — 99024 POSTOP FOLLOW-UP VISIT: CPT | Performed by: NURSE PRACTITIONER

## 2024-03-13 PROCEDURE — 36415 COLL VENOUS BLD VENIPUNCTURE: CPT

## 2024-03-13 PROCEDURE — 6360000002 HC RX W HCPCS: Performed by: NURSE PRACTITIONER

## 2024-03-13 PROCEDURE — 6370000000 HC RX 637 (ALT 250 FOR IP): Performed by: SURGERY

## 2024-03-13 PROCEDURE — 2580000003 HC RX 258: Performed by: SURGERY

## 2024-03-13 PROCEDURE — 6360000004 HC RX CONTRAST MEDICATION: Performed by: SURGERY

## 2024-03-13 PROCEDURE — APPSS30 APP SPLIT SHARED TIME 16-30 MINUTES: Performed by: NURSE PRACTITIONER

## 2024-03-13 PROCEDURE — 74177 CT ABD & PELVIS W/CONTRAST: CPT

## 2024-03-13 PROCEDURE — 1200000000 HC SEMI PRIVATE

## 2024-03-13 RX ADMIN — OXYCODONE 5 MG: 5 TABLET ORAL at 10:01

## 2024-03-13 RX ADMIN — IOPAMIDOL 80 ML: 755 INJECTION, SOLUTION INTRAVENOUS at 10:52

## 2024-03-13 RX ADMIN — SODIUM CHLORIDE, PRESERVATIVE FREE 10 ML: 5 INJECTION INTRAVENOUS at 07:52

## 2024-03-13 RX ADMIN — KETOROLAC TROMETHAMINE 30 MG: 30 INJECTION INTRAMUSCULAR; INTRAVENOUS at 10:01

## 2024-03-13 RX ADMIN — PIPERACILLIN AND TAZOBACTAM 3375 MG: 3; .375 INJECTION, POWDER, LYOPHILIZED, FOR SOLUTION INTRAVENOUS at 04:07

## 2024-03-13 RX ADMIN — SODIUM CHLORIDE: 9 INJECTION, SOLUTION INTRAVENOUS at 00:55

## 2024-03-13 RX ADMIN — KETOROLAC TROMETHAMINE 30 MG: 30 INJECTION INTRAMUSCULAR; INTRAVENOUS at 04:03

## 2024-03-13 RX ADMIN — PIPERACILLIN AND TAZOBACTAM 3375 MG: 3; .375 INJECTION, POWDER, LYOPHILIZED, FOR SOLUTION INTRAVENOUS at 21:30

## 2024-03-13 RX ADMIN — ONDANSETRON 4 MG: 2 INJECTION INTRAMUSCULAR; INTRAVENOUS at 21:23

## 2024-03-13 RX ADMIN — OXYCODONE 5 MG: 5 TABLET ORAL at 15:20

## 2024-03-13 RX ADMIN — SODIUM CHLORIDE, PRESERVATIVE FREE 10 ML: 5 INJECTION INTRAVENOUS at 21:23

## 2024-03-13 RX ADMIN — HYDROMORPHONE HYDROCHLORIDE 0.5 MG: 1 INJECTION, SOLUTION INTRAMUSCULAR; INTRAVENOUS; SUBCUTANEOUS at 17:35

## 2024-03-13 RX ADMIN — OXYCODONE 5 MG: 5 TABLET ORAL at 04:21

## 2024-03-13 RX ADMIN — NALOXEGOL OXALATE 25 MG: 25 TABLET, FILM COATED ORAL at 07:51

## 2024-03-13 RX ADMIN — ENOXAPARIN SODIUM 30 MG: 100 INJECTION SUBCUTANEOUS at 07:51

## 2024-03-13 RX ADMIN — PANTOPRAZOLE SODIUM 40 MG: 40 INJECTION, POWDER, FOR SOLUTION INTRAVENOUS at 07:52

## 2024-03-13 RX ADMIN — ONDANSETRON 4 MG: 2 INJECTION INTRAMUSCULAR; INTRAVENOUS at 04:22

## 2024-03-13 RX ADMIN — HYDROMORPHONE HYDROCHLORIDE 0.5 MG: 1 INJECTION, SOLUTION INTRAMUSCULAR; INTRAVENOUS; SUBCUTANEOUS at 13:27

## 2024-03-13 RX ADMIN — HYDROMORPHONE HYDROCHLORIDE 0.5 MG: 1 INJECTION, SOLUTION INTRAMUSCULAR; INTRAVENOUS; SUBCUTANEOUS at 21:23

## 2024-03-13 RX ADMIN — HYDROMORPHONE HYDROCHLORIDE 0.5 MG: 1 INJECTION, SOLUTION INTRAMUSCULAR; INTRAVENOUS; SUBCUTANEOUS at 00:49

## 2024-03-13 RX ADMIN — ENOXAPARIN SODIUM 30 MG: 100 INJECTION SUBCUTANEOUS at 21:23

## 2024-03-13 RX ADMIN — HYDROMORPHONE HYDROCHLORIDE 0.5 MG: 1 INJECTION, SOLUTION INTRAMUSCULAR; INTRAVENOUS; SUBCUTANEOUS at 07:52

## 2024-03-13 RX ADMIN — OXYCODONE 5 MG: 5 TABLET ORAL at 23:31

## 2024-03-13 RX ADMIN — PIPERACILLIN AND TAZOBACTAM 3375 MG: 3; .375 INJECTION, POWDER, LYOPHILIZED, FOR SOLUTION INTRAVENOUS at 12:40

## 2024-03-13 ASSESSMENT — PAIN DESCRIPTION - ORIENTATION
ORIENTATION: RIGHT;LEFT;MID
ORIENTATION: LOWER
ORIENTATION: RIGHT;LEFT;MID
ORIENTATION: RIGHT;LEFT;MID
ORIENTATION: LEFT
ORIENTATION: MID
ORIENTATION: RIGHT;LEFT;MID
ORIENTATION: MID;LOWER

## 2024-03-13 ASSESSMENT — PAIN DESCRIPTION - LOCATION
LOCATION: ABDOMEN

## 2024-03-13 ASSESSMENT — PAIN SCALES - GENERAL
PAINLEVEL_OUTOF10: 5
PAINLEVEL_OUTOF10: 5
PAINLEVEL_OUTOF10: 6
PAINLEVEL_OUTOF10: 7
PAINLEVEL_OUTOF10: 8
PAINLEVEL_OUTOF10: 7
PAINLEVEL_OUTOF10: 5
PAINLEVEL_OUTOF10: 7

## 2024-03-13 ASSESSMENT — PAIN - FUNCTIONAL ASSESSMENT
PAIN_FUNCTIONAL_ASSESSMENT: ACTIVITIES ARE NOT PREVENTED

## 2024-03-13 ASSESSMENT — PAIN DESCRIPTION - DESCRIPTORS
DESCRIPTORS: CRAMPING
DESCRIPTORS: PRESSURE
DESCRIPTORS: ACHING;THROBBING
DESCRIPTORS: CRAMPING
DESCRIPTORS: SHARP;CRAMPING
DESCRIPTORS: ACHING;THROBBING
DESCRIPTORS: ACHING;CRAMPING;SHARP
DESCRIPTORS: ACHING;SHARP
DESCRIPTORS: ACHING

## 2024-03-13 NOTE — PLAN OF CARE
Problem: Discharge Planning  Goal: Discharge to home or other facility with appropriate resources  3/13/2024 0035 by Robert Hansen RN  Outcome: Progressing  Flowsheets (Taken 3/12/2024 1932)  Discharge to home or other facility with appropriate resources:   Identify barriers to discharge with patient and caregiver   Arrange for needed discharge resources and transportation as appropriate   Identify discharge learning needs (meds, wound care, etc)  From home. Awaiting further discharge plans and orders.     Problem: Pain  Goal: Verbalizes/displays adequate comfort level or baseline comfort level  3/13/2024 0035 by Robert Hansen RN  Outcome: Progressing  Flowsheets (Taken 3/12/2024 2054)  Verbalizes/displays adequate comfort level or baseline comfort level:   Encourage patient to monitor pain and request assistance   Assess pain using appropriate pain scale   Administer analgesics based on type and severity of pain and evaluate response   Implement non-pharmacological measures as appropriate and evaluate response  Pain Assessment: 0-10  Pain Level: 3   Patient's Stated Pain Goal: 0 - No pain   Is pain goal met at this time?  Yes     Non-Pharmaceutical Pain Intervention(s): Rest, Distraction      Problem: ABCDS Injury Assessment  Goal: Absence of physical injury  3/13/2024 0035 by Robert Hansen RN  Outcome: Progressing     Problem: Skin/Tissue Integrity  Goal: Absence of new skin breakdown  Description: 1.  Monitor for areas of redness and/or skin breakdown  2.  Assess vascular access sites hourly  3.  Every 4-6 hours minimum:  Change oxygen saturation probe site  4.  Every 4-6 hours:  If on nasal continuous positive airway pressure, respiratory therapy assess nares and determine need for appliance change or resting period.  3/13/2024 0035 by Robert Hansen RN  Outcome: Progressing  No skin breakdown this shift. Patient able to turn self on bed and ambulates as tolerated. Patient states understanding of  Progressing   Patient on clear liquids.     Patient educated on how to use incentive spirometer. Patient verbalized understanding and demonstrated proper use. Emphasized importance and usage of device, with coughing and deep breathing every 2 hours while awake.     Patient educated on use of daily CHG bath to prevent surgical site infection, s/s of infection, use of incentive spirometer and early ambulation. Patient verbalized understanding. Abdomen and incision surfaces washed with CHG on this shift.      Care plan reviewed with patient. Patient verbalizes understanding of the care plan and contributes to goal setting.

## 2024-03-13 NOTE — PROGRESS NOTES
I have independently performed an evaluation on Noe . I have reviewed the above documentation completed by the NP, Madai Vitale  Italicized font, if present, represents changes to the note made by me.    Time spent with patient 15minutes.  Time could have been discontiguous.  Time does not include procedures.  Time does include my direct assessment of the patient and coordination of care.  Time represents more than 50% of the time involved with patient care by the surgical/tauma team.      Overall feeling well, having some cramping, ostomy functioning and tolerating diet with NG out. Incision with some purulent drainge where alisson were. Continue antibiotocs, will get CT scan today to eval for drainable collection.     Electronically signed by Caroline Covington MD on 3/13/2024 at 11:16 AM    General Surgery  Madai Vitale CNP APRN on behalf of   Dr. Covington  Postoperative Progress Note    Pt Name: Noe Moctezuma  Medical Record Number: [id number]  Date of Birth 1975   Today's Date: 3/13/2024    ASSESSMENT   POD # 5  S/p  Exploratory laparotomy, Takedown and resection of a leaking anastomosis with diverting colostomy, Fluorescein intraoperative evaluation of the right colon and remaining transverse colon for viability,  Intra-abdominal lavage with 4000 cc of fluid and 500 cc of irricept, Gross Fecal peritonitis present at the time of surgery PATOS  Postoperative pain as expected  Nausea with dilaudid   Leukocytosis secondary to #1 trending down 11.2  Wick dressings removed  Low grade fevers 99.1  Bowel function returned   Hypertensive   Crepitus right lateral side   Drain L 30ml, R 35 total output   FINAL DIAGNOSIS:   Colon, anastomotic site, excision:   Benign colon tissue with prominent pericolonic fibrosis, fat necrosis,       and acute inflammation including acute serositis.   Negative for malignancy.   PLAN   Advance to full liquids   Ng tube removed  Dc IV  Adequate urine output   Changed abd dressing and

## 2024-03-13 NOTE — PLAN OF CARE
Problem: Discharge Planning  Goal: Discharge to home or other facility with appropriate resources  Outcome: Progressing  Flowsheets (Taken 3/13/2024 1547)  Discharge to home or other facility with appropriate resources:   Identify barriers to discharge with patient and caregiver   Identify discharge learning needs (meds, wound care, etc)     Problem: Pain  Goal: Verbalizes/displays adequate comfort level or baseline comfort level  Outcome: Progressing  Flowsheets (Taken 3/13/2024 1547)  Verbalizes/displays adequate comfort level or baseline comfort level:   Encourage patient to monitor pain and request assistance   Assess pain using appropriate pain scale     Problem: ABCDS Injury Assessment  Goal: Absence of physical injury  Outcome: Progressing  Flowsheets (Taken 3/13/2024 1547)  Absence of Physical Injury: Implement safety measures based on patient assessment     Problem: Skin/Tissue Integrity  Goal: Absence of new skin breakdown  Description: 1.  Monitor for areas of redness and/or skin breakdown  2.  Assess vascular access sites hourly  3.  Every 4-6 hours minimum:  Change oxygen saturation probe site  4.  Every 4-6 hours:  If on nasal continuous positive airway pressure, respiratory therapy assess nares and determine need for appliance change or resting period.  Outcome: Progressing  Note: Patient able to turn self at this time.     Problem: Safety - Adult  Goal: Free from fall injury  Outcome: Progressing  Flowsheets (Taken 3/13/2024 1547)  Free From Fall Injury: Instruct family/caregiver on patient safety  Note: Patient ambulating with staff at this time.     Problem: Skin/Tissue Integrity - Adult  Goal: Incisions, wounds, or drain sites healing without S/S of infection  Outcome: Progressing  Note: Patient has no apparent skin issues at this time.     Problem: Infection - Adult  Goal: Absence of infection during hospitalization  Outcome: Progressing  Flowsheets (Taken 3/13/2024 1547)  Absence of infection  during hospitalization:   Assess and monitor for signs and symptoms of infection   Monitor lab/diagnostic results    Care plan reviewed with patient.  Patient verbalizes understanding of the plan of care and contributes to goal setting.

## 2024-03-14 ENCOUNTER — APPOINTMENT (OUTPATIENT)
Dept: GENERAL RADIOLOGY | Age: 49
DRG: 329 | End: 2024-03-14
Attending: SURGERY
Payer: COMMERCIAL

## 2024-03-14 ENCOUNTER — APPOINTMENT (OUTPATIENT)
Dept: CT IMAGING | Age: 49
DRG: 329 | End: 2024-03-14
Attending: SURGERY
Payer: COMMERCIAL

## 2024-03-14 LAB
ANION GAP SERPL CALC-SCNC: 11 MEQ/L (ref 8–16)
BUN SERPL-MCNC: 9 MG/DL (ref 7–22)
CALCIUM SERPL-MCNC: 8.4 MG/DL (ref 8.5–10.5)
CHLORIDE SERPL-SCNC: 99 MEQ/L (ref 98–111)
CO2 SERPL-SCNC: 27 MEQ/L (ref 23–33)
CREAT SERPL-MCNC: 0.8 MG/DL (ref 0.4–1.2)
DEPRECATED RDW RBC AUTO: 41.9 FL (ref 35–45)
ERYTHROCYTE [DISTWIDTH] IN BLOOD BY AUTOMATED COUNT: 13.4 % (ref 11.5–14.5)
GFR SERPL CREATININE-BSD FRML MDRD: > 60 ML/MIN/1.73M2
GLUCOSE SERPL-MCNC: 93 MG/DL (ref 70–108)
HCT VFR BLD AUTO: 34.2 % (ref 42–52)
HGB BLD-MCNC: 11.6 GM/DL (ref 14–18)
MCH RBC QN AUTO: 29.4 PG (ref 26–33)
MCHC RBC AUTO-ENTMCNC: 33.9 GM/DL (ref 32.2–35.5)
MCV RBC AUTO: 86.6 FL (ref 80–94)
PLATELET # BLD AUTO: 327 THOU/MM3 (ref 130–400)
PMV BLD AUTO: 11.1 FL (ref 9.4–12.4)
POTASSIUM SERPL-SCNC: 3.2 MEQ/L (ref 3.5–5.2)
RBC # BLD AUTO: 3.95 MILL/MM3 (ref 4.7–6.1)
SODIUM SERPL-SCNC: 137 MEQ/L (ref 135–145)
WBC # BLD AUTO: 11.7 THOU/MM3 (ref 4.8–10.8)

## 2024-03-14 PROCEDURE — 6360000004 HC RX CONTRAST MEDICATION: Performed by: SURGERY

## 2024-03-14 PROCEDURE — 6370000000 HC RX 637 (ALT 250 FOR IP): Performed by: NURSE PRACTITIONER

## 2024-03-14 PROCEDURE — 36415 COLL VENOUS BLD VENIPUNCTURE: CPT

## 2024-03-14 PROCEDURE — 6370000000 HC RX 637 (ALT 250 FOR IP): Performed by: SURGERY

## 2024-03-14 PROCEDURE — 6360000002 HC RX W HCPCS: Performed by: NURSE PRACTITIONER

## 2024-03-14 PROCEDURE — 71046 X-RAY EXAM CHEST 2 VIEWS: CPT

## 2024-03-14 PROCEDURE — 1200000000 HC SEMI PRIVATE

## 2024-03-14 PROCEDURE — 99024 POSTOP FOLLOW-UP VISIT: CPT | Performed by: NURSE PRACTITIONER

## 2024-03-14 PROCEDURE — 85027 COMPLETE CBC AUTOMATED: CPT

## 2024-03-14 PROCEDURE — 2580000003 HC RX 258: Performed by: SURGERY

## 2024-03-14 PROCEDURE — 80048 BASIC METABOLIC PNL TOTAL CA: CPT

## 2024-03-14 PROCEDURE — APPSS30 APP SPLIT SHARED TIME 16-30 MINUTES: Performed by: NURSE PRACTITIONER

## 2024-03-14 PROCEDURE — C9113 INJ PANTOPRAZOLE SODIUM, VIA: HCPCS | Performed by: SURGERY

## 2024-03-14 PROCEDURE — 74177 CT ABD & PELVIS W/CONTRAST: CPT

## 2024-03-14 PROCEDURE — 6360000002 HC RX W HCPCS: Performed by: SURGERY

## 2024-03-14 RX ORDER — POTASSIUM CHLORIDE 20 MEQ/1
40 TABLET, EXTENDED RELEASE ORAL PRN
Status: DISCONTINUED | OUTPATIENT
Start: 2024-03-14 | End: 2024-03-18 | Stop reason: HOSPADM

## 2024-03-14 RX ORDER — POTASSIUM CHLORIDE 7.45 MG/ML
10 INJECTION INTRAVENOUS PRN
Status: DISCONTINUED | OUTPATIENT
Start: 2024-03-14 | End: 2024-03-18 | Stop reason: HOSPADM

## 2024-03-14 RX ADMIN — HYDROMORPHONE HYDROCHLORIDE 0.5 MG: 1 INJECTION, SOLUTION INTRAMUSCULAR; INTRAVENOUS; SUBCUTANEOUS at 03:31

## 2024-03-14 RX ADMIN — PIPERACILLIN AND TAZOBACTAM 3375 MG: 3; .375 INJECTION, POWDER, LYOPHILIZED, FOR SOLUTION INTRAVENOUS at 20:31

## 2024-03-14 RX ADMIN — PANTOPRAZOLE SODIUM 40 MG: 40 INJECTION, POWDER, FOR SOLUTION INTRAVENOUS at 10:04

## 2024-03-14 RX ADMIN — ONDANSETRON 4 MG: 2 INJECTION INTRAMUSCULAR; INTRAVENOUS at 21:00

## 2024-03-14 RX ADMIN — NALOXEGOL OXALATE 25 MG: 25 TABLET, FILM COATED ORAL at 08:15

## 2024-03-14 RX ADMIN — HYDROMORPHONE HYDROCHLORIDE 0.5 MG: 1 INJECTION, SOLUTION INTRAMUSCULAR; INTRAVENOUS; SUBCUTANEOUS at 12:56

## 2024-03-14 RX ADMIN — HYDROMORPHONE HYDROCHLORIDE 0.5 MG: 1 INJECTION, SOLUTION INTRAMUSCULAR; INTRAVENOUS; SUBCUTANEOUS at 21:00

## 2024-03-14 RX ADMIN — HYDROMORPHONE HYDROCHLORIDE 0.5 MG: 1 INJECTION, SOLUTION INTRAMUSCULAR; INTRAVENOUS; SUBCUTANEOUS at 17:04

## 2024-03-14 RX ADMIN — ONDANSETRON 4 MG: 2 INJECTION INTRAMUSCULAR; INTRAVENOUS at 13:04

## 2024-03-14 RX ADMIN — OXYCODONE 5 MG: 5 TABLET ORAL at 14:37

## 2024-03-14 RX ADMIN — PIPERACILLIN AND TAZOBACTAM 3375 MG: 3; .375 INJECTION, POWDER, LYOPHILIZED, FOR SOLUTION INTRAVENOUS at 13:01

## 2024-03-14 RX ADMIN — ENOXAPARIN SODIUM 30 MG: 100 INJECTION SUBCUTANEOUS at 10:03

## 2024-03-14 RX ADMIN — POTASSIUM CHLORIDE 40 MEQ: 1500 TABLET, EXTENDED RELEASE ORAL at 08:48

## 2024-03-14 RX ADMIN — OXYCODONE 5 MG: 5 TABLET ORAL at 19:00

## 2024-03-14 RX ADMIN — OXYCODONE 5 MG: 5 TABLET ORAL at 23:09

## 2024-03-14 RX ADMIN — OXYCODONE 5 MG: 5 TABLET ORAL at 05:36

## 2024-03-14 RX ADMIN — SODIUM CHLORIDE, PRESERVATIVE FREE 10 ML: 5 INJECTION INTRAVENOUS at 10:04

## 2024-03-14 RX ADMIN — ENOXAPARIN SODIUM 30 MG: 100 INJECTION SUBCUTANEOUS at 20:27

## 2024-03-14 RX ADMIN — PIPERACILLIN AND TAZOBACTAM 3375 MG: 3; .375 INJECTION, POWDER, LYOPHILIZED, FOR SOLUTION INTRAVENOUS at 03:40

## 2024-03-14 RX ADMIN — IOPAMIDOL 80 ML: 755 INJECTION, SOLUTION INTRAVENOUS at 12:33

## 2024-03-14 RX ADMIN — HYDROMORPHONE HYDROCHLORIDE 0.5 MG: 1 INJECTION, SOLUTION INTRAMUSCULAR; INTRAVENOUS; SUBCUTANEOUS at 08:35

## 2024-03-14 RX ADMIN — OXYCODONE 5 MG: 5 TABLET ORAL at 09:59

## 2024-03-14 RX ADMIN — ONDANSETRON 4 MG: 2 INJECTION INTRAMUSCULAR; INTRAVENOUS at 06:51

## 2024-03-14 RX ADMIN — SODIUM CHLORIDE, PRESERVATIVE FREE 10 ML: 5 INJECTION INTRAVENOUS at 20:30

## 2024-03-14 ASSESSMENT — PAIN DESCRIPTION - DESCRIPTORS
DESCRIPTORS: THROBBING;ACHING
DESCRIPTORS: SHARP;THROBBING
DESCRIPTORS: SHARP;THROBBING
DESCRIPTORS: ACHING;CRAMPING;SHARP
DESCRIPTORS: ACHING
DESCRIPTORS: ACHING;THROBBING
DESCRIPTORS: ACHING;PRESSURE
DESCRIPTORS: ACHING
DESCRIPTORS: ACHING;CRAMPING;SHARP
DESCRIPTORS: THROBBING
DESCRIPTORS: SHARP;CRAMPING

## 2024-03-14 ASSESSMENT — PAIN DESCRIPTION - ONSET
ONSET: ON-GOING
ONSET: ON-GOING

## 2024-03-14 ASSESSMENT — PAIN - FUNCTIONAL ASSESSMENT
PAIN_FUNCTIONAL_ASSESSMENT: ACTIVITIES ARE NOT PREVENTED

## 2024-03-14 ASSESSMENT — PAIN DESCRIPTION - FREQUENCY
FREQUENCY: CONTINUOUS
FREQUENCY: CONTINUOUS

## 2024-03-14 ASSESSMENT — PAIN DESCRIPTION - LOCATION
LOCATION: ABDOMEN

## 2024-03-14 ASSESSMENT — PAIN DESCRIPTION - ORIENTATION
ORIENTATION: UPPER;MID
ORIENTATION: RIGHT;LEFT;LOWER;MID
ORIENTATION: RIGHT;LEFT;MID
ORIENTATION: MID
ORIENTATION: RIGHT;LEFT;MID;LOWER
ORIENTATION: MID
ORIENTATION: UPPER
ORIENTATION: MID

## 2024-03-14 ASSESSMENT — PAIN SCALES - GENERAL
PAINLEVEL_OUTOF10: 0
PAINLEVEL_OUTOF10: 7
PAINLEVEL_OUTOF10: 5
PAINLEVEL_OUTOF10: 4
PAINLEVEL_OUTOF10: 5
PAINLEVEL_OUTOF10: 5
PAINLEVEL_OUTOF10: 9
PAINLEVEL_OUTOF10: 0
PAINLEVEL_OUTOF10: 5
PAINLEVEL_OUTOF10: 7

## 2024-03-14 ASSESSMENT — PAIN DESCRIPTION - PAIN TYPE
TYPE: ACUTE PAIN;SURGICAL PAIN
TYPE: ACUTE PAIN;SURGICAL PAIN

## 2024-03-14 NOTE — PROGRESS NOTES
Alert and oriented to person, place, and time. Upper extremities are warm and dry. No numbness and tingling. Arm drift is negative. Hand grasp strong and equal. Cap refill and skin turgor less than three seconds. Heart sounds strong. Lungs symmetrical bilaterally and unlabored. Bowels are quiet and soft sounding in all four quadrants. Midline incision dressing is clean and dry. LLQ drain still holds a small amount of red watery drainage. RLQ still holds a small amount of yellow watery drainage. Colostomy holds very skat amount of stool. Pain rated five out of ten, sharp and throbbing. Pt has been advised to use ice, rest, and distractions to help with pain. Lower extremities are warm and dry. No numbness and tingling. Push and pedal responses strong and equal. No restriction in movement.

## 2024-03-14 NOTE — CARE COORDINATION
3/14/24, 1:58 PM EDT    DISCHARGE ON GOING EVALUATION    [facility] day: 6  Location: -09/009-A Reason for admit: Anastomotic leak of intestine [K91.89]   Procedure:   3/8 OR with Dr. Fierro: Exploratory laparotomy 2.  Takedown and resection of a leaking anastomosis with diverting colostomy  3.  Fluoroscein intraoperative evaluation of the right colon and remaining transverse colon for viability 4.  Intra-abdominal lavage with 4000 cc of fluid and 500 cc of irricept 5.  Gross Fecal peritonitis present at the time of surgery PATOS       3/14 CT of abdomen:  IMPRESSION:  1. The midline abdominal wound, the right and left percutaneous drainage catheters, and the integrity of the anterior abdominal wall appears intact and unchanged. No wound dehiscence is identified as clinically questioned. The postoperative changes   related to subtotal colectomy with a left-sided ostomy appear stable. The small amount of generalized intra-abdominal ascites, mesenteric edema, fat stranding, and ill-defined nonorganized pockets of mesenteric fluid appears stable as discussed.  2. The small bilateral pleural effusions on the consolidation at the lung bases, left greater than right, are not significantly changed. Correlate clinically for pneumonia. Follow-up to ensure resolution is advised.  3. Chronic findings are discussed  Barriers to Discharge: K 3.2, WBC 11.7. Lovenox, IV Protonix, Zosyn, prn Tylenol, Dilaudid, Roxicodone, and Zofran, Potassium replacement protocol, BMP and CBC in a.m., soft diet, closed drain care, Wound/Ostomy RN, ambulate, incentive spirometry, SCD's, up in chair.   PCP: Iliana Mejia MD  Readmission Risk Score: 8.6%  Patient Goals/Plan/Treatment Preferences: Corey is from home with his wife. Per previous CM note, patient requests [facility]. Referral faxed to [facility]. Awaiting call back for approval. Orders needed.

## 2024-03-14 NOTE — PROGRESS NOTES
Total(mL/kg) 66(0.6) 27.2(0.2)  93.2(0.8)   OUTPUT   Urine(mL/kg/hr) 450(0.5)   450   Drains 30   30   Shift Total(mL/kg) 480(4.3)   480(4.3)   Weight (kg) 111 111 111 111     PHYSICAL EXAM     CONSTITUTIONAL: Alert and oriented times 3, no acute distress and cooperative to examination with proper mood and affect.  Pulmonary - no distress, clear to auscultation, crepitus with breathing, denies chest pain  Cardiac: RRR  ABDOMEN: Normal shape. large midline scar and drains both lower quadrants scar(s) ostomy left upper/mid viable, stool in pouch,   Drains fluid is serous appearing. No increased draining at midline and no bulge, no signs of dehiscence at this time.   EXTREMITIES: no cyanosis, no clubbing, and no edema      LABS     Recent Labs     03/12/24  0502 03/12/24  0956 03/13/24  0523 03/14/24  0532   WBC  --  11.4* 11.2* 11.7*   HGB  --  12.5* 11.3* 11.6*   HCT  --  37.3* 33.8* 34.2*   PLT  --  318 298 327     --   --  137   K 3.5  --   --  3.2*     --   --  99   CO2 27  --   --  27   BUN 17  --   --  9   CREATININE 0.7  --   --  0.8   CALCIUM 8.5  --   --  8.4*      No results for input(s): \"PTT\", \"INR\" in the last 72 hours.    Invalid input(s): \"PT\"  No results for input(s): \"AST\", \"ALT\", \"BILITOT\", \"BILIDIR\", \"AMYLASE\", \"LIPASE\", \"LDH\", \"LACTA\" in the last 72 hours.  No results for input(s): \"TROPONINT\" in the last 72 hours.  RADIOLOGY     XR CHEST (2 VW)   Final Result   1. Borderline heart size. Small effusion left side.   2. Moderate bibasilar atelectasis/pneumonia, worse on the left.            **This report has been created using voice recognition software.  It may contain minor errors which are inherent in voice recognition technology.**      Final report electronically signed by Dr. Oliver Edmonds on 3/14/2024 7:53 AM      CT ABDOMEN PELVIS W IV CONTRAST Additional Contrast? None   Final Result   1. Postoperative changes related to some total colectomy with a surgical anastomosis in the  atelectatic changes. Critical Results: Category 1 The critical information above was relayed directly by me by telephone to FARIDA Cesar3/8/2024 at 8:27 am. Interpretation by Wale Mcconnell M.D. Professional Interpretation by  Radiology Reading workstation: [id number]    XR CHEST 1 VIEW    Result Date: 3/8/2024  Findings highly suspicious for pneumoperitoneum.  Advise correlation at CT abdomen/pelvis which is currently pending. Bibasilar patchy opacities of atelectasis or infiltrate. Cardiomegaly. Interpretation by Wale Pantoja D.O. Professional Interpretation by  Radiology Reading workstation: [id number]    Electronically signed by ALL Randolph CNP on 3/14/2024 at 8:24 AM

## 2024-03-14 NOTE — PLAN OF CARE
Problem: Discharge Planning  Goal: Discharge to home or other facility with appropriate resources  3/14/2024 0023 by Robert Hansen RN  Outcome: Progressing  Flowsheets (Taken 3/13/2024 2119)  Discharge to home or other facility with appropriate resources:   Identify barriers to discharge with patient and caregiver   Arrange for needed discharge resources and transportation as appropriate   Identify discharge learning needs (meds, wound care, etc)  From home. Awaiting further discharge and orders.     Problem: Pain  Goal: Verbalizes/displays adequate comfort level or baseline comfort level  3/14/2024 0023 by Robert Hansen, RN  Outcome: Progressing  Flowsheets (Taken 3/13/2024 2119)  Verbalizes/displays adequate comfort level or baseline comfort level:   Encourage patient to monitor pain and request assistance   Assess pain using appropriate pain scale   Administer analgesics based on type and severity of pain and evaluate response   Implement non-pharmacological measures as appropriate and evaluate response  Pain Assessment: 0-10  Pain Level: 5   Patient's Stated Pain Goal: 0 - No pain   Is pain goal met at this time?  Yes     Non-Pharmaceutical Pain Intervention(s): Rest, Distraction      Problem: Skin/Tissue Integrity  Goal: Absence of new skin breakdown  Description: 1.  Monitor for areas of redness and/or skin breakdown  2.  Assess vascular access sites hourly  3.  Every 4-6 hours minimum:  Change oxygen saturation probe site  4.  Every 4-6 hours:  If on nasal continuous positive airway pressure, respiratory therapy assess nares and determine need for appliance change or resting period.  3/14/2024 0023 by Robert Hansen, RN  Outcome: Progressing  No skin breakdown this shift. Patient able to turn self on bed and ambulates as tolerated. Patient states understanding of repositioning every two hours.      Problem: Gastrointestinal - Adult  Goal: Maintains or returns to baseline bowel function  Outcome:

## 2024-03-14 NOTE — PROGRESS NOTES
Alert and oriented to person, place, time, and the situation. Pupils are round and reactive. Pupils constricted from a 3 mm down to a 2 mm. Patient wears eye glasses. Pt upper extremities are warm and dry. No numbness or tingling. Hand grasp is strong and equal. Arm drift is negative. Cap refill and skin turgor are less than three seconds. Heart sounds are strong. Lung sounds are clear, symmetrical bilaterally, and unlabored. Bowels sounds were very quiet and soft sounding in all four quadrants. Pt has a midline incision covered with ABD pad, clean and intact. Pt has 2 LEIGHA drains, LLQ and RLQ, and a colostomy bag. Right drain holds a small amount of clear yellow liquid. Left drain holds a small amount of a clear red liquid. Pt states right drain is very tender. Pt pain is rated a seven out of 10, pain feels like he just came right out of surgery. Pt has been advised to use ice, distractions, and repositioning to help with the pain. Lower extremities are warm and dry. No numbness or tingling is present. Push and pedal responses are strong and equal. No restriction with pt movement.

## 2024-03-14 NOTE — PLAN OF CARE
Problem: Discharge Planning  Goal: Discharge to home or other facility with appropriate resources  3/14/2024 1159 by Kimberley Howe RN  Outcome: Progressing  Flowsheets (Taken 3/14/2024 1159)  Discharge to home or other facility with appropriate resources:   Identify barriers to discharge with patient and caregiver   Identify discharge learning needs (meds, wound care, etc)     Problem: Pain  Goal: Verbalizes/displays adequate comfort level or baseline comfort level  3/14/2024 1159 by Kimberley Howe RN  Outcome: Progressing  Flowsheets (Taken 3/14/2024 1159)  Verbalizes/displays adequate comfort level or baseline comfort level:   Encourage patient to monitor pain and request assistance   Assess pain using appropriate pain scale     Problem: ABCDS Injury Assessment  Goal: Absence of physical injury  3/14/2024 1159 by Kimberley Howe RN  Outcome: Progressing  Flowsheets (Taken 3/14/2024 1159)  Absence of Physical Injury: Implement safety measures based on patient assessment     Problem: Skin/Tissue Integrity  Goal: Absence of new skin breakdown  Description: 1.  Monitor for areas of redness and/or skin breakdown  2.  Assess vascular access sites hourly  3.  Every 4-6 hours minimum:  Change oxygen saturation probe site  4.  Every 4-6 hours:  If on nasal continuous positive airway pressure, respiratory therapy assess nares and determine need for appliance change or resting period.  3/14/2024 1159 by Kimberley Howe RN  Outcome: Progressing  Note: Patient is able to turn self at this time.       Problem: Safety - Adult  Goal: Free from fall injury  3/14/2024 1159 by Kimberley Howe RN  Outcome: Progressing  Note: Patient bed alarm on at this time.     Problem: Skin/Tissue Integrity - Adult  Goal: Incisions, wounds, or drain sites healing without S/S of infection  3/14/2024 1159 by Kimberley Howe RN  Outcome: Progressing  Note: Patient is able to turn self at this time.     Problem: Infection -  Adult  Goal: Absence of infection during hospitalization  3/14/2024 1159 by Kimberley Howe, RN  Outcome: Progressing  Flowsheets (Taken 3/14/2024 1159)  Absence of infection during hospitalization:   Assess and monitor for signs and symptoms of infection   Monitor lab/diagnostic results     Problem: Nutrition Deficit:  Goal: Optimize nutritional status  3/14/2024 1159 by Kimberley Howe, RN  Outcome: Progressing  Flowsheets (Taken 3/14/2024 1159)  Nutrient intake appropriate for improving, restoring, or maintaining nutritional needs: Monitor oral intake, labs, and treatment plans      Care plan reviewed with patient.  Patient verbalizes understanding of the plan of care and contributes to goal setting.

## 2024-03-15 PROBLEM — K91.89 ANASTOMOTIC LEAK OF INTESTINE: Status: RESOLVED | Noted: 2024-03-08 | Resolved: 2024-03-15

## 2024-03-15 PROBLEM — K65.8 FECAL PERITONITIS (HCC): Status: RESOLVED | Noted: 2024-03-08 | Resolved: 2024-03-15

## 2024-03-15 PROBLEM — R10.0 ACUTE ABDOMEN: Status: RESOLVED | Noted: 2024-03-08 | Resolved: 2024-03-15

## 2024-03-15 LAB
ANION GAP SERPL CALC-SCNC: 11 MEQ/L (ref 8–16)
BUN SERPL-MCNC: 7 MG/DL (ref 7–22)
CALCIUM SERPL-MCNC: 8.6 MG/DL (ref 8.5–10.5)
CHLORIDE SERPL-SCNC: 99 MEQ/L (ref 98–111)
CO2 SERPL-SCNC: 30 MEQ/L (ref 23–33)
CREAT SERPL-MCNC: 0.8 MG/DL (ref 0.4–1.2)
DEPRECATED RDW RBC AUTO: 42.4 FL (ref 35–45)
ERYTHROCYTE [DISTWIDTH] IN BLOOD BY AUTOMATED COUNT: 13.5 % (ref 11.5–14.5)
GFR SERPL CREATININE-BSD FRML MDRD: > 60 ML/MIN/1.73M2
GLUCOSE SERPL-MCNC: 106 MG/DL (ref 70–108)
HCT VFR BLD AUTO: 36.5 % (ref 42–52)
HGB BLD-MCNC: 12 GM/DL (ref 14–18)
MCH RBC QN AUTO: 28.5 PG (ref 26–33)
MCHC RBC AUTO-ENTMCNC: 32.9 GM/DL (ref 32.2–35.5)
MCV RBC AUTO: 86.7 FL (ref 80–94)
PLATELET # BLD AUTO: 363 THOU/MM3 (ref 130–400)
PMV BLD AUTO: 10.7 FL (ref 9.4–12.4)
POTASSIUM SERPL-SCNC: 3.5 MEQ/L (ref 3.5–5.2)
RBC # BLD AUTO: 4.21 MILL/MM3 (ref 4.7–6.1)
SODIUM SERPL-SCNC: 140 MEQ/L (ref 135–145)
WBC # BLD AUTO: 10.6 THOU/MM3 (ref 4.8–10.8)

## 2024-03-15 PROCEDURE — 6360000002 HC RX W HCPCS: Performed by: SURGERY

## 2024-03-15 PROCEDURE — 6370000000 HC RX 637 (ALT 250 FOR IP): Performed by: NURSE PRACTITIONER

## 2024-03-15 PROCEDURE — 1200000000 HC SEMI PRIVATE

## 2024-03-15 PROCEDURE — 99024 POSTOP FOLLOW-UP VISIT: CPT | Performed by: NURSE PRACTITIONER

## 2024-03-15 PROCEDURE — 2580000003 HC RX 258: Performed by: SURGERY

## 2024-03-15 PROCEDURE — 80048 BASIC METABOLIC PNL TOTAL CA: CPT

## 2024-03-15 PROCEDURE — 36415 COLL VENOUS BLD VENIPUNCTURE: CPT

## 2024-03-15 PROCEDURE — 6360000002 HC RX W HCPCS: Performed by: NURSE PRACTITIONER

## 2024-03-15 PROCEDURE — C9113 INJ PANTOPRAZOLE SODIUM, VIA: HCPCS | Performed by: SURGERY

## 2024-03-15 PROCEDURE — APPSS30 APP SPLIT SHARED TIME 16-30 MINUTES: Performed by: NURSE PRACTITIONER

## 2024-03-15 PROCEDURE — 85027 COMPLETE CBC AUTOMATED: CPT

## 2024-03-15 RX ORDER — OXYCODONE HYDROCHLORIDE 5 MG/1
5 TABLET ORAL EVERY 6 HOURS PRN
Qty: 24 TABLET | Refills: 0 | Status: SHIPPED | OUTPATIENT
Start: 2024-03-15 | End: 2024-03-22

## 2024-03-15 RX ADMIN — PIPERACILLIN AND TAZOBACTAM 3375 MG: 3; .375 INJECTION, POWDER, LYOPHILIZED, FOR SOLUTION INTRAVENOUS at 19:50

## 2024-03-15 RX ADMIN — OXYCODONE 5 MG: 5 TABLET ORAL at 17:11

## 2024-03-15 RX ADMIN — HYDROMORPHONE HYDROCHLORIDE 0.5 MG: 1 INJECTION, SOLUTION INTRAMUSCULAR; INTRAVENOUS; SUBCUTANEOUS at 19:58

## 2024-03-15 RX ADMIN — HYDROMORPHONE HYDROCHLORIDE 0.5 MG: 1 INJECTION, SOLUTION INTRAMUSCULAR; INTRAVENOUS; SUBCUTANEOUS at 04:56

## 2024-03-15 RX ADMIN — PANTOPRAZOLE SODIUM 40 MG: 40 INJECTION, POWDER, FOR SOLUTION INTRAVENOUS at 08:08

## 2024-03-15 RX ADMIN — HYDROMORPHONE HYDROCHLORIDE 0.5 MG: 1 INJECTION, SOLUTION INTRAMUSCULAR; INTRAVENOUS; SUBCUTANEOUS at 10:02

## 2024-03-15 RX ADMIN — ENOXAPARIN SODIUM 30 MG: 100 INJECTION SUBCUTANEOUS at 08:08

## 2024-03-15 RX ADMIN — ENOXAPARIN SODIUM 30 MG: 100 INJECTION SUBCUTANEOUS at 19:47

## 2024-03-15 RX ADMIN — ONDANSETRON 4 MG: 2 INJECTION INTRAMUSCULAR; INTRAVENOUS at 04:35

## 2024-03-15 RX ADMIN — OXYCODONE 5 MG: 5 TABLET ORAL at 11:37

## 2024-03-15 RX ADMIN — OXYCODONE 5 MG: 5 TABLET ORAL at 23:10

## 2024-03-15 RX ADMIN — SODIUM CHLORIDE, PRESERVATIVE FREE 10 ML: 5 INJECTION INTRAVENOUS at 08:08

## 2024-03-15 RX ADMIN — OXYCODONE 5 MG: 5 TABLET ORAL at 02:49

## 2024-03-15 RX ADMIN — HYDROMORPHONE HYDROCHLORIDE 0.5 MG: 1 INJECTION, SOLUTION INTRAMUSCULAR; INTRAVENOUS; SUBCUTANEOUS at 15:21

## 2024-03-15 RX ADMIN — HYDROMORPHONE HYDROCHLORIDE 0.5 MG: 1 INJECTION, SOLUTION INTRAMUSCULAR; INTRAVENOUS; SUBCUTANEOUS at 01:15

## 2024-03-15 RX ADMIN — SODIUM CHLORIDE, PRESERVATIVE FREE 10 ML: 5 INJECTION INTRAVENOUS at 19:47

## 2024-03-15 RX ADMIN — ONDANSETRON 4 MG: 2 INJECTION INTRAMUSCULAR; INTRAVENOUS at 10:02

## 2024-03-15 RX ADMIN — OXYCODONE 5 MG: 5 TABLET ORAL at 06:37

## 2024-03-15 RX ADMIN — PIPERACILLIN AND TAZOBACTAM 3375 MG: 3; .375 INJECTION, POWDER, LYOPHILIZED, FOR SOLUTION INTRAVENOUS at 13:49

## 2024-03-15 RX ADMIN — PIPERACILLIN AND TAZOBACTAM 3375 MG: 3; .375 INJECTION, POWDER, LYOPHILIZED, FOR SOLUTION INTRAVENOUS at 04:15

## 2024-03-15 ASSESSMENT — PAIN DESCRIPTION - PAIN TYPE
TYPE: ACUTE PAIN;SURGICAL PAIN

## 2024-03-15 ASSESSMENT — PAIN - FUNCTIONAL ASSESSMENT
PAIN_FUNCTIONAL_ASSESSMENT: ACTIVITIES ARE NOT PREVENTED
PAIN_FUNCTIONAL_ASSESSMENT: PREVENTS OR INTERFERES SOME ACTIVE ACTIVITIES AND ADLS
PAIN_FUNCTIONAL_ASSESSMENT: ACTIVITIES ARE NOT PREVENTED

## 2024-03-15 ASSESSMENT — PAIN DESCRIPTION - LOCATION
LOCATION: ABDOMEN

## 2024-03-15 ASSESSMENT — PAIN DESCRIPTION - FREQUENCY
FREQUENCY: CONTINUOUS

## 2024-03-15 ASSESSMENT — PAIN SCALES - GENERAL
PAINLEVEL_OUTOF10: 7
PAINLEVEL_OUTOF10: 8
PAINLEVEL_OUTOF10: 4
PAINLEVEL_OUTOF10: 0
PAINLEVEL_OUTOF10: 6
PAINLEVEL_OUTOF10: 7
PAINLEVEL_OUTOF10: 7
PAINLEVEL_OUTOF10: 0
PAINLEVEL_OUTOF10: 6
PAINLEVEL_OUTOF10: 7
PAINLEVEL_OUTOF10: 4
PAINLEVEL_OUTOF10: 6
PAINLEVEL_OUTOF10: 8
PAINLEVEL_OUTOF10: 4
PAINLEVEL_OUTOF10: 5
PAINLEVEL_OUTOF10: 5
PAINLEVEL_OUTOF10: 6

## 2024-03-15 ASSESSMENT — PAIN DESCRIPTION - DESCRIPTORS
DESCRIPTORS: ACHING

## 2024-03-15 ASSESSMENT — PAIN DESCRIPTION - ONSET
ONSET: ON-GOING

## 2024-03-15 ASSESSMENT — PAIN DESCRIPTION - ORIENTATION
ORIENTATION: MID
ORIENTATION: RIGHT;LEFT;MID
ORIENTATION: MID

## 2024-03-15 NOTE — PROGRESS NOTES
Comprehensive Nutrition Assessment    Type and Reason for Visit:  Reassess    Nutrition Recommendations/Plan:   Recommend a Multivitamin daily.  Continue Ensure Enlive TID.  Diet per General Surgery.     Malnutrition Assessment:  Malnutrition Status:  Moderate malnutrition (03/11/24 1232)    Context:  Acute Illness     Findings of the 6 clinical characteristics of malnutrition:  Energy Intake:  50% or less of estimated energy requirements for 5 or more days  Weight Loss:   (-1.6% in 2.5 weeks)     Body Fat Loss:  Mild body fat loss Orbital   Muscle Mass Loss:  No significant muscle mass loss    Fluid Accumulation:  Unable to assess     Strength:  Not Performed    Nutrition Assessment: Pt. nutritionally compromised AEB pt report of fair appetite consuming about 50% of most meals with and consumes at least one Ensure daily.  At risk for further nutrition compromise r/t altered GI function, recent diagnosis of colon cancer (colectomy 2/28/24, colostomy 3/8) and underlying medical condition (hx diverticulitis, HLD).        Nutrition Related Findings:    Pt. Report/Treatments/Miscellaneous: Pt seen - he reports fair appetite consuming ~50% of meals yesterday d/t being in pain. Pt reports he feels hungry for breakfast today. Pt received breakfast tray during visit. Pt reports fair acceptance of Ensure and consumes at least one Ensure daily. Encouraged pt to consume Ensure Plus in between meals d/t pt report of getting full quick.   GI Status: +colostomy with 100 ml output in 24 hours.  Pertinent Labs: Noted from 3/15/24.  Pertinent Meds: Movantik, Protonix, Zosyn, Zofran PRN    Wound Type: Surgical Incision (3/8 Exploratory laparotomy, Takedown and resection of a leaking anastomosis with diverting colostomy;                                     Gross Fecal peritonitis present at the time of surgery)       Current Nutrition Intake & Therapies:    Average Meal Intake: 51-75%, 26-50% (per pt report of meals  yesterday)  Average Supplements Intake: 26-50% (fair acceptance of Ensure)  ADULT ORAL NUTRITION SUPPLEMENT; Breakfast, Lunch, Dinner; Standard High Calorie/High Protein Oral Supplement  ADULT DIET; Dysphagia - Soft and Bite Sized    Anthropometric Measures:  Height: 182.9 cm (6')  Ideal Body Weight (IBW): 178 lbs (81 kg)    Admission Body Weight: 103.9 kg (229 lb) (3/10 standing scale, no edema)  Current Body Weight: 111.2 kg (245 lb 2.4 oz) (3/15; no edema noted), 128.7 % IBW.  Current BMI (kg/m2): 33.2  Usual Body Weight:  (per pt - had been trying to lose wt prior to colon ca diagnosis; per EMR: 10/2/23: 245# 10 oz, 2/23/24: 232# 13 oz)  BMI Categories: Obese Class 1 (BMI 30.0-34.9)    Estimated Daily Nutrient Needs:  Energy Requirements Based On: Kcal/kg  Weight Used for Energy Requirements: Other (Comment) (104)  Energy (kcal/day): 0668-1625 kcals (18-20)  Weight Used for Protein Requirements: Ideal (81)  Protein (g/day):  grams (1.2-1.4)    Nutrition Diagnosis:   Moderate malnutrition related to altered GI function as evidenced by poor intake prior to admission, mild loss of subcutaneous fat    Nutrition Interventions:   Food and/or Nutrient Delivery: Continue Current Diet, Continue Oral Nutrition Supplement, Vitamin Supplement  Nutrition Education/Counseling: Education completed  Coordination of Nutrition Care: Continue to monitor while inpatient    Goals:  Previous Goal Met: Progressing toward Goal(s)  Goals: Meet at least 75% of estimated needs, by next RD assessment    Nutrition Monitoring and Evaluation:   Food/Nutrient Intake Outcomes: Diet Advancement/Tolerance, Food and Nutrient Intake, Supplement Intake, Vitamin/Mineral Intake  Physical Signs/Symptoms Outcomes: Biochemical Data, Chewing or Swallowing, GI Status, Fluid Status or Edema, Nutrition Focused Physical Findings, Skin, Weight    Discharge Planning:    Too soon to determine     Rosa Maria Garza MS, RD, LD  Contact: [phone]

## 2024-03-15 NOTE — PROGRESS NOTES
Outpatient pharmacy sent patient oxycodone prescription through secure tube. This RN gave the prescription medication to patient.

## 2024-03-15 NOTE — PROGRESS NOTES
General Surgery  Madai Vitale CNP APRN on behalf of   Dr. Covington  Postoperative Progress Note    Pt Name: Noe Moctezuma  Medical Record Number: [id number]  Date of Birth 1975   Today's Date: 3/15/2024    ASSESSMENT   POD # 7  S/p  Exploratory laparotomy, Takedown and resection of a leaking anastomosis with diverting colostomy, Fluorescein intraoperative evaluation of the right colon and remaining transverse colon for viability,  Intra-abdominal lavage with 4000 cc of fluid and 500 cc of irricept, Gross Fecal peritonitis present at the time of surgery PATOS  Pain midline incision, felt something pop no increased drainage or bulge, repeat CT scan negative for fascial dehiscence    Leukocytosis  resolved  Low grade fevers  intermittent   Bowel function returned   Hypertension improved   Crepitus right lateral side   Drain L 15ml, R 30 total output   Hypokalemia resolved  Chest x ray - Moderate bibasilar atelectasis/pneumonia, worse on the left.   FINAL DIAGNOSIS:   Colon, anastomotic site, excision:   Benign colon tissue with prominent pericolonic fibrosis, fat necrosis,       and acute inflammation including acute serositis.   Negative for malignancy.   PLAN   soft diet   Ng tube removed  Dc IV  Adequate urine output   abd dressing midline   Monitor WBC and fevers  on Zosyn  Continue broad-spectrum antibiotics, oral for home   Lovenox and SCDs for DVT prophylaxis  GI prophylaxis  Labs as needed,  replace lytes per protocol   Analgesia and antiemetics as ordered adjusted   Wound and ostomy to assist with bag changes and education   CT AP no intraabdominal abscess noted  Home health at discharge, discharge planning likely over weekend pending progress  SUBJECTIVE   Noe is doing much better this am.  He has had increased pain through midline incision, improving. Pain medication through out the night and utilizing Ice packs.  He has has stool in the ostomy pouch. He is tolerating  ADULT ORAL NUTRITION  stable as discussed.      2. The small bilateral pleural effusions on the consolidation at the lung bases, left greater than right, are not significantly changed. Correlate clinically for pneumonia. Follow-up to ensure resolution is advised.      3. Chronic findings are discussed.            **This report has been created using voice recognition software.  It may contain minor errors which are inherent in voice recognition technology.**      Final report electronically signed by Dr Kareen Zelaya on 3/14/2024 2:03 PM      XR CHEST (2 VW)   Final Result   1. Borderline heart size. Small effusion left side.   2. Moderate bibasilar atelectasis/pneumonia, worse on the left.            **This report has been created using voice recognition software.  It may contain minor errors which are inherent in voice recognition technology.**      Final report electronically signed by Dr. Oliver Edmonds on 3/14/2024 7:53 AM      CT ABDOMEN PELVIS W IV CONTRAST Additional Contrast? None   Final Result   1. Postoperative changes related to some total colectomy with a surgical anastomosis in the distal colon appears intact. Small and large bowel loops exhibit mild bowel wall thickening in part related to underdistention/postoperative changes. The amount    of pneumoperitoneum has diminished. A small amount of generalized intra-abdominal ascites is observed. Mesenteric edema, fat stranding, and several ill-defined, nonorganized, collections of of mesenteric fluid exhibiting mild peripheral enhancement are    scattered throughout the abdomen for example in the upper abdomen near the midline, along the anterior margin of the liver and stomach, in the left lower quadrant mesentery, with measurements provided in the discussion.      2. Bladder wall thickening may be artifactual related to underdistention. Correlate with urinalysis. No obstructive uropathy is visualized.      3. Small bilateral pleural effusions and bilateral lower lobe

## 2024-03-15 NOTE — PLAN OF CARE
Problem: Discharge Planning  Goal: Discharge to home or other facility with appropriate resources  3/15/2024 0052 by Doreen Solomon RN  Outcome: Progressing  Flowsheets (Taken 3/15/2024 0052)  Discharge to home or other facility with appropriate resources:   Identify barriers to discharge with patient and caregiver   Identify discharge learning needs (meds, wound care, etc)   Arrange for needed discharge resources and transportation as appropriate   Refer to discharge planning if patient needs post-hospital services based on physician order or complex needs related to functional status, cognitive ability or social support system     Problem: Pain  Goal: Verbalizes/displays adequate comfort level or baseline comfort level  3/15/2024 0052 by Doreen Solomon RN  Outcome: Progressing  Flowsheets (Taken 3/15/2024 0052)  Verbalizes/displays adequate comfort level or baseline comfort level:   Encourage patient to monitor pain and request assistance   Assess pain using appropriate pain scale   Administer analgesics based on type and severity of pain and evaluate response   Implement non-pharmacological measures as appropriate and evaluate response     Problem: ABCDS Injury Assessment  Goal: Absence of physical injury  3/15/2024 0052 by Doreen Solomon RN  Outcome: Progressing  Flowsheets (Taken 3/15/2024 0052)  Absence of Physical Injury: Implement safety measures based on patient assessment     Problem: Skin/Tissue Integrity  Goal: Absence of new skin breakdown  Description: 1.  Monitor for areas of redness and/or skin breakdown  2.  Assess vascular access sites hourly  3.  Every 4-6 hours minimum:  Change oxygen saturation probe site  4.  Every 4-6 hours:  If on nasal continuous positive airway pressure, respiratory therapy assess nares and determine need for appliance change or resting period.  3/15/2024 0052 by Doreen Solomon RN  Outcome: Progressing  Note: No new skin breakdown noted     Problem:

## 2024-03-15 NOTE — CARE COORDINATION
3/15/24, 3:17 PM EDT    Patient goals/plan/ treatment preferences discussed by  and .  Patient goals/plan/ treatment preferences reviewed with patient/ family.  Patient/ family verbalize understanding of discharge plan and are in agreement with goal/plan/treatment preferences.  Understanding was demonstrated using the teach back method.  AVS provided by RN at time of discharge, which includes all necessary medical information pertaining to the patients current course of illness, treatment, post-discharge goals of care, and treatment preferences.     Services At/After Discharge: Home Health skilled nursing through [facility].

## 2024-03-15 NOTE — PROGRESS NOTES
[facility] Wound Ostomy Continence Nurse  Ostomy Referral Progress Note      NAME:  Noe Moctezuma  MEDICAL RECORD NUMBER:  [id number]  AGE: 48 y.o.   GENDER:  male  :  1975  TODAY'S DATE:  3/15/2024    Subjective   Reason for [facility] Evaluation and Assessment: new colostomy on Friday      Noe Moctezuma is a 48 y.o. male referred by:   [] Physician/ PA/ APRN-CNP  [x] Nursing  [] Other:     New  Surgeon: Sri  Date of surgery: 3/8/24    Objective     Jarred Risk Score Jarred Scale Score: 20    Assessment     Encounter: Present to patient room for new colostomy. Patient in bed. Wife at side. Assessment and photo to follow. One post-op piece pouching system removed. Cleansed stoma with warm wash cloth, pat dry. Skin prep applied to chinedu-stomal skin. Coloplast 2 piece red flange cut to fit to size, protective ring applied to inner edge of flange, centered over stoma, and applied. Pouch applied. Barrier extenders placed on outer edge of flange. Applied hands lightly over system to activate hydrocolloid. Educated patient and wife step by step throughout pouching system application. Answered questions and concerns. Will plan to change pouching system and educate early next week if still admitted. Highly recommend patient have home health assist with ostomy care after discharge. Pt and wife state they would like  care.  Ostomy educational information given to patient, with additional pouching systems in room. Will continue to follow. Bed in low, call light in reach.       OSTOMY ASSESSMENT:  Photo:    Ostomy Type: Colostomy  Ostomy location: [facility]  Size of stoma: 45mm  Height:  Protruding  Color: Beefy red  Mucocutaneous junction: intact  Peristomal skin: intact  Peristomal Creases: none  Output:Serosanguinous      Plan   Plan for Ostomy Care:    Ostomy Plan of Care  [x] Supplies/Instructions left in room  [] Patient using home supplies  [x] Current pouching system: Coloplast    Current Diet: ADULT ORAL

## 2024-03-16 PROCEDURE — 99024 POSTOP FOLLOW-UP VISIT: CPT | Performed by: NURSE PRACTITIONER

## 2024-03-16 PROCEDURE — 6370000000 HC RX 637 (ALT 250 FOR IP): Performed by: SURGERY

## 2024-03-16 PROCEDURE — 2580000003 HC RX 258: Performed by: SURGERY

## 2024-03-16 PROCEDURE — APPSS30 APP SPLIT SHARED TIME 16-30 MINUTES: Performed by: NURSE PRACTITIONER

## 2024-03-16 PROCEDURE — 6370000000 HC RX 637 (ALT 250 FOR IP): Performed by: NURSE PRACTITIONER

## 2024-03-16 PROCEDURE — 1200000000 HC SEMI PRIVATE

## 2024-03-16 PROCEDURE — 6360000002 HC RX W HCPCS: Performed by: SURGERY

## 2024-03-16 PROCEDURE — C9113 INJ PANTOPRAZOLE SODIUM, VIA: HCPCS | Performed by: SURGERY

## 2024-03-16 RX ORDER — SENNA AND DOCUSATE SODIUM 50; 8.6 MG/1; MG/1
2 TABLET, FILM COATED ORAL DAILY PRN
Status: DISCONTINUED | OUTPATIENT
Start: 2024-03-16 | End: 2024-03-18 | Stop reason: HOSPADM

## 2024-03-16 RX ORDER — DOCUSATE SODIUM 100 MG/1
100 CAPSULE, LIQUID FILLED ORAL 2 TIMES DAILY PRN
Status: DISCONTINUED | OUTPATIENT
Start: 2024-03-16 | End: 2024-03-17

## 2024-03-16 RX ORDER — POLYETHYLENE GLYCOL 3350 17 G/17G
17 POWDER, FOR SOLUTION ORAL DAILY
Status: DISCONTINUED | OUTPATIENT
Start: 2024-03-16 | End: 2024-03-18 | Stop reason: HOSPADM

## 2024-03-16 RX ADMIN — ONDANSETRON 4 MG: 4 TABLET, ORALLY DISINTEGRATING ORAL at 15:14

## 2024-03-16 RX ADMIN — PIPERACILLIN AND TAZOBACTAM 3375 MG: 3; .375 INJECTION, POWDER, LYOPHILIZED, FOR SOLUTION INTRAVENOUS at 20:07

## 2024-03-16 RX ADMIN — ONDANSETRON 4 MG: 4 TABLET, ORALLY DISINTEGRATING ORAL at 23:14

## 2024-03-16 RX ADMIN — PANTOPRAZOLE SODIUM 40 MG: 40 INJECTION, POWDER, FOR SOLUTION INTRAVENOUS at 10:50

## 2024-03-16 RX ADMIN — SODIUM CHLORIDE, PRESERVATIVE FREE 10 ML: 5 INJECTION INTRAVENOUS at 10:52

## 2024-03-16 RX ADMIN — OXYCODONE 5 MG: 5 TABLET ORAL at 19:57

## 2024-03-16 RX ADMIN — POLYETHYLENE GLYCOL 3350 17 G: 17 POWDER, FOR SOLUTION ORAL at 10:50

## 2024-03-16 RX ADMIN — ENOXAPARIN SODIUM 30 MG: 100 INJECTION SUBCUTANEOUS at 10:50

## 2024-03-16 RX ADMIN — OXYCODONE 5 MG: 5 TABLET ORAL at 07:32

## 2024-03-16 RX ADMIN — PIPERACILLIN AND TAZOBACTAM 3375 MG: 3; .375 INJECTION, POWDER, LYOPHILIZED, FOR SOLUTION INTRAVENOUS at 11:38

## 2024-03-16 RX ADMIN — OXYCODONE 5 MG: 5 TABLET ORAL at 15:14

## 2024-03-16 RX ADMIN — OXYCODONE 5 MG: 5 TABLET ORAL at 11:35

## 2024-03-16 RX ADMIN — DOCUSATE SODIUM 100 MG: 100 CAPSULE, LIQUID FILLED ORAL at 23:14

## 2024-03-16 RX ADMIN — OXYCODONE 5 MG: 5 TABLET ORAL at 03:17

## 2024-03-16 RX ADMIN — PIPERACILLIN AND TAZOBACTAM 3375 MG: 3; .375 INJECTION, POWDER, LYOPHILIZED, FOR SOLUTION INTRAVENOUS at 03:27

## 2024-03-16 RX ADMIN — ENOXAPARIN SODIUM 30 MG: 100 INJECTION SUBCUTANEOUS at 19:57

## 2024-03-16 RX ADMIN — SODIUM CHLORIDE, PRESERVATIVE FREE 10 ML: 5 INJECTION INTRAVENOUS at 19:57

## 2024-03-16 RX ADMIN — NALOXEGOL OXALATE 12.5 MG: 12.5 TABLET, FILM COATED ORAL at 10:50

## 2024-03-16 ASSESSMENT — PAIN SCALES - GENERAL
PAINLEVEL_OUTOF10: 6
PAINLEVEL_OUTOF10: 7
PAINLEVEL_OUTOF10: 8
PAINLEVEL_OUTOF10: 7
PAINLEVEL_OUTOF10: 7
PAINLEVEL_OUTOF10: 8
PAINLEVEL_OUTOF10: 7
PAINLEVEL_OUTOF10: 6
PAINLEVEL_OUTOF10: 7
PAINLEVEL_OUTOF10: 5

## 2024-03-16 ASSESSMENT — PAIN - FUNCTIONAL ASSESSMENT
PAIN_FUNCTIONAL_ASSESSMENT: ACTIVITIES ARE NOT PREVENTED

## 2024-03-16 ASSESSMENT — PAIN DESCRIPTION - DESCRIPTORS
DESCRIPTORS: ACHING
DESCRIPTORS: CRAMPING
DESCRIPTORS: ACHING

## 2024-03-16 ASSESSMENT — PAIN DESCRIPTION - ORIENTATION
ORIENTATION: RIGHT;LEFT;MID

## 2024-03-16 ASSESSMENT — PAIN DESCRIPTION - LOCATION
LOCATION: ABDOMEN

## 2024-03-16 NOTE — PROGRESS NOTES
General Surgery  Dr. Morgan Vidal MD covering for Dr. Caroline Covington  Postoperative Progress Note    Pt Name: Noe Moctezuma  Medical Record Number: [id number]  Date of Birth 1975   Today's Date: 3/16/2024    ASSESSMENT   POD # 8  S/p  Exploratory laparotomy, Takedown and resection of a leaking anastomosis with diverting colostomy, Fluorescein intraoperative evaluation of the right colon and remaining transverse colon for viability,  Intra-abdominal lavage with 4000 cc of fluid and 500 cc of irricept, Gross Fecal peritonitis present at the time of surgery PATOS with Dr. Fierro  Status post Robotic Left Sigmoid Colon Resection attempted, converted to open procedure on 2/28   PLAN   Tolerating soft diet. Awaiting better bowel function.   Start miralax. Resume Movantik.   Wound care with betadine and dressing changes daily   On Zosyn. Continue broad-spectrum antibiotics orally at discharge  Lovenox and SCDs for DVT prophylaxis  Pain controlled. On oxy-IR.  Pulmonary toileting with IS and C&DB  Wound and ostomy following  CT AP no intraabdominal abscess noted 3/14  Home health at discharge  Clinically, looks good but no real bowel function over the last 24 hours and patient concerned with this. Would like to stay until function improves. Miralax ordered and also can have senna/colace later tonight if requests. Encourage continued ambulation which he is doing. Maybe home tomorrow if bowel function improving.  SUBJECTIVE   Noe is doing well. VS stable. Low grade 99 temps yesterday but afebrile this morning. Eating breakfast and seems to be tolerating well. Concerned with no ostomy function over the last 24 hours. Bag was changed yesterday and no stool noted today. Small amount of flatus. Nausea only with Dilaudid use but otherwise has not had any nausea or vomiting. Tolerating diet. Drains are out. Midline incision intact with small amount of serosanguineous drainage from mid incision. No dehiscence

## 2024-03-16 NOTE — PLAN OF CARE
Problem: Discharge Planning  Goal: Discharge to home or other facility with appropriate resources  Outcome: Progressing     Problem: Pain  Goal: Verbalizes/displays adequate comfort level or baseline comfort level  Outcome: Progressing   Pain Assessment: 0-10  Pain Level: 8   Patient's Stated Pain Goal: 0 - No pain   Is pain goal met at this time?  Yes     Non-Pharmaceutical Pain Intervention(s): Ice    Problem: ABCDS Injury Assessment  Goal: Absence of physical injury  Outcome: Progressing     Problem: Skin/Tissue Integrity  Goal: Absence of new skin breakdown  Description: 1.  Monitor for areas of redness and/or skin breakdown  2.  Assess vascular access sites hourly  3.  Every 4-6 hours minimum:  Change oxygen saturation probe site  4.  Every 4-6 hours:  If on nasal continuous positive airway pressure, respiratory therapy assess nares and determine need for appliance change or resting period.  Outcome: Progressing     Problem: Gastrointestinal - Adult  Goal: Maintains or returns to baseline bowel function  Outcome: Progressing     Problem: Gastrointestinal - Adult  Goal: Establish and maintain optimal ostomy function  Outcome: Progressing     Problem: Skin/Tissue Integrity - Adult  Goal: Incisions, wounds, or drain sites healing without S/S of infection  Outcome: Progressing     Problem: Infection - Adult  Goal: Absence of infection during hospitalization  Outcome: Progressing     Problem: Safety - Adult  Goal: Free from fall injury  Outcome: Progressing   All fall precautions in place. Bed in low position, alarm activated and appropriate use of call light.     Care plan reviewed with patient.  Patient verbalize understanding of the plan of care and contribute to goal setting.

## 2024-03-17 PROCEDURE — C9113 INJ PANTOPRAZOLE SODIUM, VIA: HCPCS | Performed by: SURGERY

## 2024-03-17 PROCEDURE — 1200000000 HC SEMI PRIVATE

## 2024-03-17 PROCEDURE — 6360000002 HC RX W HCPCS: Performed by: SURGERY

## 2024-03-17 PROCEDURE — 99024 POSTOP FOLLOW-UP VISIT: CPT | Performed by: NURSE PRACTITIONER

## 2024-03-17 PROCEDURE — 2580000003 HC RX 258: Performed by: SURGERY

## 2024-03-17 PROCEDURE — APPSS45 APP SPLIT SHARED TIME 31-45 MINUTES: Performed by: NURSE PRACTITIONER

## 2024-03-17 PROCEDURE — 6370000000 HC RX 637 (ALT 250 FOR IP): Performed by: NURSE PRACTITIONER

## 2024-03-17 RX ORDER — DOCUSATE SODIUM 100 MG/1
100 CAPSULE, LIQUID FILLED ORAL 2 TIMES DAILY
Status: DISCONTINUED | OUTPATIENT
Start: 2024-03-17 | End: 2024-03-18 | Stop reason: HOSPADM

## 2024-03-17 RX ORDER — KETOROLAC TROMETHAMINE 30 MG/ML
15 INJECTION, SOLUTION INTRAMUSCULAR; INTRAVENOUS EVERY 8 HOURS PRN
Status: DISCONTINUED | OUTPATIENT
Start: 2024-03-17 | End: 2024-03-18 | Stop reason: HOSPADM

## 2024-03-17 RX ORDER — AMOXICILLIN AND CLAVULANATE POTASSIUM 875; 125 MG/1; MG/1
1 TABLET, FILM COATED ORAL 2 TIMES DAILY
Qty: 14 TABLET | Refills: 0 | Status: SHIPPED | OUTPATIENT
Start: 2024-03-17 | End: 2024-03-24

## 2024-03-17 RX ADMIN — SODIUM CHLORIDE, PRESERVATIVE FREE 10 ML: 5 INJECTION INTRAVENOUS at 08:16

## 2024-03-17 RX ADMIN — NALOXEGOL OXALATE 12.5 MG: 12.5 TABLET, FILM COATED ORAL at 08:16

## 2024-03-17 RX ADMIN — ONDANSETRON 4 MG: 2 INJECTION INTRAMUSCULAR; INTRAVENOUS at 13:52

## 2024-03-17 RX ADMIN — DOCUSATE SODIUM 100 MG: 100 CAPSULE, LIQUID FILLED ORAL at 08:16

## 2024-03-17 RX ADMIN — OXYCODONE 5 MG: 5 TABLET ORAL at 13:52

## 2024-03-17 RX ADMIN — OXYCODONE 5 MG: 5 TABLET ORAL at 05:33

## 2024-03-17 RX ADMIN — OXYCODONE 5 MG: 5 TABLET ORAL at 23:04

## 2024-03-17 RX ADMIN — OXYCODONE 5 MG: 5 TABLET ORAL at 00:57

## 2024-03-17 RX ADMIN — PIPERACILLIN AND TAZOBACTAM 3375 MG: 3; .375 INJECTION, POWDER, LYOPHILIZED, FOR SOLUTION INTRAVENOUS at 15:08

## 2024-03-17 RX ADMIN — OXYCODONE 5 MG: 5 TABLET ORAL at 10:00

## 2024-03-17 RX ADMIN — PIPERACILLIN AND TAZOBACTAM 3375 MG: 3; .375 INJECTION, POWDER, LYOPHILIZED, FOR SOLUTION INTRAVENOUS at 04:32

## 2024-03-17 RX ADMIN — ONDANSETRON 4 MG: 2 INJECTION INTRAMUSCULAR; INTRAVENOUS at 08:16

## 2024-03-17 RX ADMIN — ENOXAPARIN SODIUM 30 MG: 100 INJECTION SUBCUTANEOUS at 20:31

## 2024-03-17 RX ADMIN — PIPERACILLIN AND TAZOBACTAM 3375 MG: 3; .375 INJECTION, POWDER, LYOPHILIZED, FOR SOLUTION INTRAVENOUS at 23:07

## 2024-03-17 RX ADMIN — OXYCODONE 5 MG: 5 TABLET ORAL at 18:15

## 2024-03-17 RX ADMIN — ENOXAPARIN SODIUM 30 MG: 100 INJECTION SUBCUTANEOUS at 08:16

## 2024-03-17 RX ADMIN — ONDANSETRON 4 MG: 2 INJECTION INTRAMUSCULAR; INTRAVENOUS at 20:18

## 2024-03-17 RX ADMIN — SODIUM CHLORIDE, PRESERVATIVE FREE 10 ML: 5 INJECTION INTRAVENOUS at 20:31

## 2024-03-17 RX ADMIN — PANTOPRAZOLE SODIUM 40 MG: 40 INJECTION, POWDER, FOR SOLUTION INTRAVENOUS at 09:57

## 2024-03-17 RX ADMIN — POLYETHYLENE GLYCOL 3350 17 G: 17 POWDER, FOR SOLUTION ORAL at 08:16

## 2024-03-17 RX ADMIN — DOCUSATE SODIUM 100 MG: 100 CAPSULE, LIQUID FILLED ORAL at 20:31

## 2024-03-17 ASSESSMENT — PAIN DESCRIPTION - ORIENTATION
ORIENTATION: RIGHT;LEFT;MID

## 2024-03-17 ASSESSMENT — PAIN DESCRIPTION - DESCRIPTORS
DESCRIPTORS: CRAMPING;DISCOMFORT
DESCRIPTORS: CRAMPING
DESCRIPTORS: CRAMPING
DESCRIPTORS: ACHING
DESCRIPTORS: CRAMPING
DESCRIPTORS: CRAMPING

## 2024-03-17 ASSESSMENT — PAIN DESCRIPTION - LOCATION
LOCATION: ABDOMEN

## 2024-03-17 ASSESSMENT — PAIN SCALES - GENERAL
PAINLEVEL_OUTOF10: 7

## 2024-03-17 ASSESSMENT — PAIN - FUNCTIONAL ASSESSMENT
PAIN_FUNCTIONAL_ASSESSMENT: ACTIVITIES ARE NOT PREVENTED
PAIN_FUNCTIONAL_ASSESSMENT: ACTIVITIES ARE NOT PREVENTED

## 2024-03-17 ASSESSMENT — PAIN DESCRIPTION - PAIN TYPE: TYPE: ACUTE PAIN;SURGICAL PAIN

## 2024-03-17 NOTE — PROGRESS NOTES
General Surgery  Dr. Morgan Vidal MD covering for Dr. Caroline Covington  Postoperative Progress Note    Pt Name: Noe Moctezuma  Medical Record Number: [id number]  Date of Birth 1975   Today's Date: 3/17/2024    ASSESSMENT   POD # 9 S/p  Exploratory laparotomy, Takedown and resection of a leaking anastomosis with diverting colostomy, Fluorescein intraoperative evaluation of the right colon and remaining transverse colon for viability,  Intra-abdominal lavage with 4000 cc of fluid and 500 cc of irricept, Gross Fecal peritonitis present at the time of surgery PATOS with Dr. Fierro  Status post Robotic Left Sigmoid Colon Resection attempted, converted to open procedure on 2/28   PLAN   Tolerating soft diet. Awaiting better bowel function.   Continue miralax & movantik. Colace BID and senna PRN.   Wound care with betadine and dressing changes daily   On Zosyn. Continue broad-spectrum antibiotics orally at discharge.  Lovenox and SCDs for DVT prophylaxis  Pain controlled. On oxy-IR.  Pulmonary toileting with IS and C&DB  Wound and ostomy following  Home health at discharge  Doing well but still not much bowel function. Just have some starting overnight but would like things to be better. Bowel regimen adjusted otherwise patient feels ready to go home.  SUBJECTIVE   Noe is doing well. VS stable. No fevers. Feels pretty good just still not much bowel function. He does have 1/2 dollar sized amount of soft stool noted over stoma this morning. Passing flatus. A little nausea with Miralax yesterday but takes the Zofran just to help prevent any nausea. No vomiting. Tolerating diet. Drains are out. Midline incision intact with small amount of serosanguineous drainage from mid incision. No dehiscence though. Ostomy viable and pink.  CURRENT MEDICATIONS   Scheduled Meds:   docusate sodium  100 mg Oral BID    polyethylene glycol  17 g Oral Daily    naloxegol  12.5 mg Oral Daily    pantoprazole  40 mg IntraVENous Daily     piperacillin-tazobactam  3,375 mg IntraVENous Q8H    sodium chloride flush  5-40 mL IntraVENous 2 times per day    enoxaparin  30 mg SubCUTAneous BID     Continuous Infusions:   sodium chloride Stopped (24 1803)     PRN Meds:.ketorolac, sennosides-docusate sodium, potassium chloride **OR** potassium alternative oral replacement **OR** potassium chloride, oxyCODONE, acetaminophen, HYDROmorphone, hydrALAZINE, sodium chloride flush, sodium chloride, ondansetron **OR** ondansetron  ROS:   History obtained from chart review and the patient, General ROS:   OBJECTIVE   CURRENT VITALS:  height is 1.829 m (6') and weight is 112.6 kg (248 lb 3.8 oz). His oral temperature is 98.8 °F (37.1 °C). His blood pressure is 132/82 and his pulse is 66. His respiration is 16 and oxygen saturation is 95%.  Body mass index is 33.67 kg/m².  Temperature Range (24h):Temp: 98.8 °F (37.1 °C) Temp  Av °F (37.2 °C)  Min: 98.8 °F (37.1 °C)  Max: 99.4 °F (37.4 °C)  BP Range (24h): Systolic (24hrs), Av , Min:122 , Max:134     Diastolic (24hrs), Av, Min:80, Max:87    Pulse Range (24h): Pulse  Av.2  Min: 66  Max: 93  Respiration Range (24h): Resp  Av  Min: 16  Max: 16  Current Pulse Ox (24h):  SpO2: 95 %  Pulse Ox Range (24h):  SpO2  Av %  Min: 91 %  Max: 95 %  Oxygen Amount and Delivery: O2 Flow Rate (L/min): 0 L/min  Incentive Spirometry Tx:       Achieved Volume (mL): 2000 mL  In: 1910 [P.O.:1900; I.V.:10]  Out: 2550 [Urine:2550]  [REMOVED] Closed/Suction Drain RLQ Bulb-Output (ml): 20 ml  [REMOVED] Closed/Suction Drain Left LLQ Bulb-Output (ml): 20 ml  Date 24 0000 - 24 2359   Shift 0599-5420 7347-6620 4989-4084 24 Hour Total   INTAKE   P.O. 400   400   Shift Total(mL/kg) 400(3.6)   400(3.6)   OUTPUT   Urine(mL/kg/hr) 700(0.8)   700   Shift Total(mL/kg) 700(6.2)   700(6.2)   Weight (kg) 112.6 112.6 112.6 112.6       PHYSICAL EXAM     CONSTITUTIONAL: Alert and oriented times 3, no acute distress and

## 2024-03-17 NOTE — PLAN OF CARE
Problem: Discharge Planning  Goal: Discharge to home or other facility with appropriate resources  Outcome: Progressing     Problem: Pain  Goal: Verbalizes/displays adequate comfort level or baseline comfort level  Outcome: Progressing   Pain Assessment: 0-10  Pain Level: 5   Patient's Stated Pain Goal: 3   Is pain goal met at this time?  Yes     Non-Pharmaceutical Pain Intervention(s): Ice    Problem: ABCDS Injury Assessment  Goal: Absence of physical injury  Outcome: Progressing     Problem: Skin/Tissue Integrity  Goal: Absence of new skin breakdown  Description: 1.  Monitor for areas of redness and/or skin breakdown  2.  Assess vascular access sites hourly  3.  Every 4-6 hours minimum:  Change oxygen saturation probe site  4.  Every 4-6 hours:  If on nasal continuous positive airway pressure, respiratory therapy assess nares and determine need for appliance change or resting period.  Outcome: Progressing     Problem: Gastrointestinal - Adult  Goal: Maintains or returns to baseline bowel function  Outcome: Progressing     Problem: Gastrointestinal - Adult  Goal: Establish and maintain optimal ostomy function  Outcome: Progressing     Problem: Skin/Tissue Integrity - Adult  Goal: Incisions, wounds, or drain sites healing without S/S of infection  Outcome: Progressing   Skin assessment completed.  Patient turned every 2 hours and as needed.  No skin breakdown this shift.      Problem: Infection - Adult  Goal: Absence of infection during hospitalization  Outcome: Progressing     Problem: Safety - Adult  Goal: Free from fall injury  Outcome: Progressing   All fall precautions in place. Bed in low position, alarm activated and appropriate use of call light.     Problem: Nutrition Deficit:  Goal: Optimize nutritional status  Outcome: Progressing     Patient educated on how to use incentive spirometer. Patient verbalized understanding and demonstrated proper use. Emphasized importance and usage of device, with coughing  and deep breathing every 2 hours while awake.            Care plan reviewed with patient.  Patient verbalize understanding of the plan of care and contribute to goal setting.

## 2024-03-18 ENCOUNTER — TELEPHONE (OUTPATIENT)
Dept: FAMILY MEDICINE CLINIC | Age: 49
End: 2024-03-18

## 2024-03-18 VITALS
RESPIRATION RATE: 17 BRPM | WEIGHT: 248.24 LBS | DIASTOLIC BLOOD PRESSURE: 82 MMHG | HEIGHT: 72 IN | SYSTOLIC BLOOD PRESSURE: 124 MMHG | OXYGEN SATURATION: 94 % | TEMPERATURE: 98.4 F | HEART RATE: 74 BPM | BODY MASS INDEX: 33.62 KG/M2

## 2024-03-18 PROCEDURE — APPSS45 APP SPLIT SHARED TIME 31-45 MINUTES: Performed by: NURSE PRACTITIONER

## 2024-03-18 PROCEDURE — 6370000000 HC RX 637 (ALT 250 FOR IP): Performed by: SURGERY

## 2024-03-18 PROCEDURE — 99024 POSTOP FOLLOW-UP VISIT: CPT | Performed by: NURSE PRACTITIONER

## 2024-03-18 PROCEDURE — 6360000002 HC RX W HCPCS: Performed by: SURGERY

## 2024-03-18 PROCEDURE — 6370000000 HC RX 637 (ALT 250 FOR IP): Performed by: NURSE PRACTITIONER

## 2024-03-18 PROCEDURE — 2580000003 HC RX 258: Performed by: SURGERY

## 2024-03-18 PROCEDURE — C9113 INJ PANTOPRAZOLE SODIUM, VIA: HCPCS | Performed by: SURGERY

## 2024-03-18 RX ORDER — ONDANSETRON 4 MG/1
4 TABLET, ORALLY DISINTEGRATING ORAL EVERY 8 HOURS PRN
Qty: 20 TABLET | Refills: 0 | Status: SHIPPED | OUTPATIENT
Start: 2024-03-18 | End: 2024-03-25

## 2024-03-18 RX ADMIN — OXYCODONE 5 MG: 5 TABLET ORAL at 10:15

## 2024-03-18 RX ADMIN — PANTOPRAZOLE SODIUM 40 MG: 40 INJECTION, POWDER, FOR SOLUTION INTRAVENOUS at 10:56

## 2024-03-18 RX ADMIN — POLYETHYLENE GLYCOL 3350 17 G: 17 POWDER, FOR SOLUTION ORAL at 10:15

## 2024-03-18 RX ADMIN — OXYCODONE 5 MG: 5 TABLET ORAL at 04:37

## 2024-03-18 RX ADMIN — PIPERACILLIN AND TAZOBACTAM 3375 MG: 3; .375 INJECTION, POWDER, LYOPHILIZED, FOR SOLUTION INTRAVENOUS at 08:17

## 2024-03-18 RX ADMIN — ONDANSETRON 4 MG: 4 TABLET, ORALLY DISINTEGRATING ORAL at 10:20

## 2024-03-18 RX ADMIN — DOCUSATE SODIUM 100 MG: 100 CAPSULE, LIQUID FILLED ORAL at 10:16

## 2024-03-18 RX ADMIN — NALOXEGOL OXALATE 12.5 MG: 12.5 TABLET, FILM COATED ORAL at 10:15

## 2024-03-18 RX ADMIN — ENOXAPARIN SODIUM 30 MG: 100 INJECTION SUBCUTANEOUS at 10:16

## 2024-03-18 ASSESSMENT — PAIN SCALES - WONG BAKER
WONGBAKER_NUMERICALRESPONSE: HURTS A LITTLE BIT
WONGBAKER_NUMERICALRESPONSE: NO HURT

## 2024-03-18 ASSESSMENT — PAIN DESCRIPTION - ONSET
ONSET: ON-GOING
ONSET: ON-GOING

## 2024-03-18 ASSESSMENT — PAIN DESCRIPTION - LOCATION
LOCATION: ABDOMEN

## 2024-03-18 ASSESSMENT — PAIN SCALES - GENERAL
PAINLEVEL_OUTOF10: 6
PAINLEVEL_OUTOF10: 2
PAINLEVEL_OUTOF10: 1

## 2024-03-18 ASSESSMENT — PAIN DESCRIPTION - PAIN TYPE
TYPE: ACUTE PAIN;SURGICAL PAIN
TYPE: SURGICAL PAIN;ACUTE PAIN

## 2024-03-18 ASSESSMENT — PAIN DESCRIPTION - FREQUENCY
FREQUENCY: INTERMITTENT
FREQUENCY: INTERMITTENT

## 2024-03-18 ASSESSMENT — PAIN DESCRIPTION - ORIENTATION
ORIENTATION: MID;UPPER
ORIENTATION: UPPER;MID
ORIENTATION: RIGHT;LEFT;MID

## 2024-03-18 ASSESSMENT — PAIN DESCRIPTION - DESCRIPTORS
DESCRIPTORS: CRAMPING;DISCOMFORT
DESCRIPTORS: CRAMPING;DISCOMFORT;SHARP
DESCRIPTORS: ACHING;DISCOMFORT;CRAMPING

## 2024-03-18 NOTE — PROGRESS NOTES
T:97.7(O), P: 60, R: 16, BP: 120/85, PO2: 97. Respirations are deep and unlabored. Radial pulse is 2+ regular. Patient is alert and oriented x4. Patient's mood matched facial expression and is appropriate for the situation. Patient's speech is clear and hearing is intact. Patient reports a pain of 6 out of 10. The pain radiates in the mid abdominal region. The pain is a cramping feeling. Skin is pink, warm, dry, and pale. There is the presence of incision on the middle abdomen. Hair is well groomed and evenly distributed. Nails are well groomed and capillary refill is less than three seconds. Trachea is midline with no deviation. Head is normalocephalic. Lateral canthus of the eye aligns with the helix of the ar. Pupil are are round and reactive with direct and consensual constriction. Sclera are white and conjunctiva is pink and moist. Lips are pink and buccal mucosa is pink and point. Teeth are intact and nose is midline. Chest is symmetric with sternum midline. Bronchovesicular and vesicular sounds noted posteriorly and anteriorly with localized crepitus. No CVA tenderness noted. Clear s1 and s2 sounds noted in the aortic, pulmonic, tricuspid, and mitral valves. No heaves, thrills, or murmurs. Abdomen is round and tender. Umbilicus is midline with the presence of midline incision. Bowel sounds active in all 4 quadrants. Tenderness noted upon palpation. Legs are symmetric. No pretibial edema. Right posterior tibial pulse is 1+. Left posterior tibial and right and left popliteal pulses are 2+ regular. FROM in head, neck,trunk, lower extremities, and upper extremities. No numbness, tingling, or pain in hands, legs, or feet.

## 2024-03-18 NOTE — CARE COORDINATION
3/18/24, 10:11 AM EDT    Patient goals/plan/ treatment preferences discussed by  and .  Patient goals/plan/ treatment preferences reviewed with patient/ family.  Patient/ family verbalize understanding of discharge plan and are in agreement with goal/plan/treatment preferences.  Understanding was demonstrated using the teach back method.  AVS provided by RN at time of discharge, which includes all necessary medical information pertaining to the patients current course of illness, treatment, post-discharge goals of care, and treatment preferences.     Services At/After Discharge: Nursing service             [facility]. Notified of discharge today.

## 2024-03-18 NOTE — PROGRESS NOTES
Discharge education and instructions provided. Patient verbalized understanding at this time. No questions asked. IV access removed. All personal belongings and AVS present with patient. Transport to [facility] via wheelchair by Student Nurse Ama.

## 2024-03-18 NOTE — TELEPHONE ENCOUNTER
Catia with [employer] called stating patient is going to be discharged from [facility] and was questioning if Dr. Mejia was going to still follow patient after being discharged. Advised Catia that  will follow

## 2024-03-18 NOTE — PLAN OF CARE
Problem: Discharge Planning  Goal: Discharge to home or other facility with appropriate resources  Outcome: Adequate for Discharge     Problem: Pain  Goal: Verbalizes/displays adequate comfort level or baseline comfort level  Outcome: Adequate for Discharge     Problem: ABCDS Injury Assessment  Goal: Absence of physical injury  Outcome: Adequate for Discharge     Problem: Skin/Tissue Integrity  Goal: Absence of new skin breakdown  Description: 1.  Monitor for areas of redness and/or skin breakdown  2.  Assess vascular access sites hourly  3.  Every 4-6 hours minimum:  Change oxygen saturation probe site  4.  Every 4-6 hours:  If on nasal continuous positive airway pressure, respiratory therapy assess nares and determine need for appliance change or resting period.  Outcome: Adequate for Discharge     Problem: Gastrointestinal - Adult  Goal: Maintains or returns to baseline bowel function  Outcome: Adequate for Discharge     Problem: Gastrointestinal - Adult  Goal: Establish and maintain optimal ostomy function  Outcome: Adequate for Discharge     Problem: Safety - Adult  Goal: Free from fall injury  Outcome: Adequate for Discharge     Problem: Skin/Tissue Integrity - Adult  Goal: Incisions, wounds, or drain sites healing without S/S of infection  Outcome: Adequate for Discharge     Problem: Infection - Adult  Goal: Absence of infection during hospitalization  Outcome: Adequate for Discharge     Problem: Nutrition Deficit:  Goal: Optimize nutritional status  Outcome: Adequate for Discharge    Care plan reviewed with patient , patient verbalized understanding of plan of care and contributed to goal setting.

## 2024-03-18 NOTE — DISCHARGE SUMMARY
Discharge Summary     Patient Identification:  Noe Moctezuma  : 1975  MRN: [id number]   Account: [id number]     Admit date: 3/8/2024  Discharge date: 3/18/24   Attending provider: Caroline Covington MD        Primary care provider: Iliana Mejia MD     Discharge Diagnoses:   Principal Problem:    S/P laparotomy  Active Problems:    Hypertension    S/P colostomy (HCC)    Moderate malnutrition (HCC)  Resolved Problems:    Anastomotic leak of intestine    Acute abdomen    Fecal peritonitis (HCC)       Hospital Course:   Noe Moctezuma is a 48 y.o. male admitted to [facility] on 3/8/2024 for anastomic leak s/p exploratory laparotomy partial bowel resection for adenocarcinoma. he was taken to the operative suite per Kamlesh Fierro MD and the planned a Diverting Colostomy with abdominal washout.   He was admitted to  for postoperative care and was initially managed with nasogastric decompression, bowel rest, analgesics for pain control, IV fluid hydration, GI and DVT prophylaxis and IV antibiotics. He developed low grade fevers and stable WBC, developed atelectasis vs pneumonia. Over the hospital stay he slowly improved in ability to tolerate increasing levels of activity and to take po fluids, solid foods with evidence of returning bowel function, and spontaneously voiding. At the time of discharge he was able to tolerate pain with oral analgesic and was medically stable.    Procedures:   Operative Report     PATIENT NAME: Noe Moctezuma  MEDICAL RECORD NO. [id number]  SURGEON: Wale Fierro MD MD [facility]  Primary Care Physician: Iliana Mejia MD  Date: 3/8/2024, 2:44 PM     PROCEDURE PERFORMED: 1.  Exploratory laparotomy                                                   2.  Takedown and resection of a leaking anastomosis with diverting colostomy                                                   3.  Fluoroscein intraoperative evaluation of the right colon and remaining transverse  identified. Common bile duct is non-dilated. There is mild fatty atrophy of the pancreas. The spleen is within normal limits in size. The adrenals appear normal. No suspicious renal cortical lesions are seen. No renal or ureteral calculi are seen. There is no hydronephrosis or hydroureter. The abdominal aorta demonstrates normal caliber. There is no significant mesenteric or retroperitoneal lymphadenopathy. The stomach appears unremarkable. There is no evidence of small-bowel obstruction. Surgical changes of left hemicolectomy are noted. There is prominent stool in the cecum and ascending colon which may represent constipation. The appendix is surgically absent. PELVIS: There is no pelvic lymphadenopathy or mass identified. Prostate appears unremarkable. Urinary bladder is moderately distended.  There is a small amount of gas in the urinary bladder which may be related to recent catheterization. No suspicious osseous lesions are seen. The superficial soft tissues appear otherwise unremarkable.    1. There is moderate-to-large free intraperitoneal air.  Moderate ascites is present.  Greater than 2 weeks post surgery, these findings are suspicious for anastomotic leak versus residual postop changes. 2. Trace bilateral pleural effusions and associated atelectatic changes. Critical Results: Category 1 The critical information above was relayed directly by me by telephone to FARIDA Tali3/8/2024 at 8:27 am. Interpretation by Wale Mcconnell M.D. Professional Interpretation by  Radiology Reading workstation: [id number]    XR CHEST 1 VIEW    Result Date: 3/8/2024  EXAM INFORMATION: Examination: XRAY CHEST 1 VIEW FRONTAL Date of Exam: 3/8/2024 8:09 am Diagnosis/Reason for Exam: abd pain; Generalized abdominal pain Additional History: abdominal pain Comparison: RAD - Chest - Portable - 1 View dated 6/25/2023; CT - Abdomen/Pelvis With Contrast dated 3/8/2024 Number of Views: 1 DISCUSSION: Lungs / pleura:  Low lung volumes.

## 2024-03-19 ENCOUNTER — CARE COORDINATION (OUTPATIENT)
Dept: CASE MANAGEMENT | Age: 49
End: 2024-03-19

## 2024-03-19 ENCOUNTER — TELEPHONE (OUTPATIENT)
Dept: FAMILY MEDICINE CLINIC | Age: 49
End: 2024-03-19

## 2024-03-19 DIAGNOSIS — Z93.3 S/P COLOSTOMY (HCC): Primary | ICD-10-CM

## 2024-03-19 NOTE — CARE COORDINATION
[facility] [place] [facility]   5/1/2024  1:00 PM Caroline Bernal APRN - CNP [facility] [place] [facility]   5/3/2024  8:20 AM [facility]       Care Transition Nurse provided contact information.  Plan for follow-up call in 5-7 days based on severity of symptoms and risk factors.  Plan for next call: symptom management-new or worsening symptoms  self management-colostomy  follow-up appointment-review f/u 3/26    Anuja Thompson RN

## 2024-03-19 NOTE — TELEPHONE ENCOUNTER
Care Transitions Initial Follow Up Call    Outreach made within 2 business days of discharge: Yes    Patient: Noe Moctezuma Patient : 1975   MRN: [id number]  Reason for Admission:colostomy  Discharge Date: 3/18/24       Spoke with: patient     Discharge department/facility: [facility] Interactive Patient Contact:  Was patient able to fill all prescriptions: Yes  Was patient instructed to bring all medications to the follow-up visit: Yes  Is patient taking all medications as directed in the discharge summary? Yes  Does patient understand their discharge instructions: Yes  Does patient have questions or concerns that need addressed prior to 7-14 day follow up office visit: no    Scheduled appointment with PCP within 7-14 days    Follow Up  Future Appointments   Date Time Provider Department Center   3/25/2024  9:20 AM Iliana Mejia MD [facility]   3/26/2024 10:00 AM Caroline Covington MD [facility] - [place]   2024  8:30 AM Caroline Bernal APRN - CNP [facility] [place] [facility]   2024  1:00 PM Caroline Bernal APRN - CNP [facility] [place] [facility]       Cheryl Ramirez [facility]

## 2024-03-20 NOTE — PROGRESS NOTES
Physician Progress Note      PATIENT:               BULMARO RHODES  CSN #:                  [id number]  :                       1975  ADMIT DATE:       3/8/2024 11:34 AM  DISCH DATE:        3/18/2024 12:53 PM  RESPONDING  PROVIDER #:        Wale Fierro MD          QUERY TEXT:    Pt admitted with Anastomotic leak of intestine Pt noted to have   Intra-abdominal Infection op notes on 3/8, WBC15.2, , RR 29If possible,   please document in the progress notes and discharge summary if you are   evaluating and /or treating any of the following:    The medical record reflects the following:  Risk Factors: Anastomotic leak of intestine.  Clinical Indicators: op notes  Gross Fecal peritonitis present at the time of   surgery PATOS. Lab values WBC15.2, , RR 29 noted on 3/8., No CRP, No   Procal, No Lactic Repeated WBC: 7.0, 11.4, 11.2, 11.7, 14.2, 14.2, 15. 2  24: TEMP: 99.0, : 98.4, 03/15/24: 99.3, Pulse: 80, 84, 91, 66,  Treatment: piperacillin-tazobactam. OP procedure: exploratory Lap,    Chetna Orellana, RN-BSN, CRCR  Clinical   [email or web address]  [employer]  Options provided:  -- Sepsis is due to Intra-abdominal Infection confirmed, present on admission  -- Intra-abdominal Infection without Sepsis  -- Other - I will add my own diagnosis  -- Disagree - Not applicable / Not valid  -- Disagree - Clinically unable to determine / Unknown  -- Refer to Clinical Documentation Reviewer    PROVIDER RESPONSE TEXT:    I only did this case as on call physician and care turned over to his primary   surgeon Dr Covington you should ask her    Query created by: Suad Orellana on 3/15/2024 12:58 PM      Electronically signed by:  Wale Fierro MD 3/20/2024 10:47 AM

## 2024-03-21 DIAGNOSIS — K63.89 MASS OF COLON: ICD-10-CM

## 2024-03-25 ENCOUNTER — OFFICE VISIT (OUTPATIENT)
Dept: FAMILY MEDICINE CLINIC | Age: 49
End: 2024-03-25

## 2024-03-25 ENCOUNTER — CARE COORDINATION (OUTPATIENT)
Dept: CARE COORDINATION | Age: 49
End: 2024-03-25

## 2024-03-25 ENCOUNTER — ENROLLMENT (OUTPATIENT)
Dept: CARE COORDINATION | Age: 49
End: 2024-03-25

## 2024-03-25 VITALS
DIASTOLIC BLOOD PRESSURE: 86 MMHG | HEIGHT: 73 IN | HEART RATE: 91 BPM | WEIGHT: 204.8 LBS | OXYGEN SATURATION: 95 % | BODY MASS INDEX: 27.14 KG/M2 | SYSTOLIC BLOOD PRESSURE: 122 MMHG

## 2024-03-25 DIAGNOSIS — F33.1 MODERATE EPISODE OF RECURRENT MAJOR DEPRESSIVE DISORDER (HCC): ICD-10-CM

## 2024-03-25 DIAGNOSIS — Z93.3 S/P COLOSTOMY (HCC): ICD-10-CM

## 2024-03-25 DIAGNOSIS — Z09 HOSPITAL DISCHARGE FOLLOW-UP: ICD-10-CM

## 2024-03-25 DIAGNOSIS — C18.6 MALIGNANT NEOPLASM OF DESCENDING COLON (HCC): Primary | ICD-10-CM

## 2024-03-25 RX ORDER — CITALOPRAM HYDROBROMIDE 10 MG/1
10 TABLET ORAL DAILY
Qty: 30 TABLET | Refills: 1 | Status: SHIPPED | OUTPATIENT
Start: 2024-03-25

## 2024-03-25 RX ORDER — ONDANSETRON 4 MG/1
4 TABLET, ORALLY DISINTEGRATING ORAL 3 TIMES DAILY PRN
Qty: 21 TABLET | Refills: 0 | Status: SHIPPED | OUTPATIENT
Start: 2024-03-25

## 2024-03-25 SDOH — ECONOMIC STABILITY: HOUSING INSECURITY
IN THE LAST 12 MONTHS, WAS THERE A TIME WHEN YOU DID NOT HAVE A STEADY PLACE TO SLEEP OR SLEPT IN A SHELTER (INCLUDING NOW)?: NO

## 2024-03-25 SDOH — ECONOMIC STABILITY: FOOD INSECURITY: WITHIN THE PAST 12 MONTHS, THE FOOD YOU BOUGHT JUST DIDN'T LAST AND YOU DIDN'T HAVE MONEY TO GET MORE.: NEVER TRUE

## 2024-03-25 SDOH — ECONOMIC STABILITY: FOOD INSECURITY: WITHIN THE PAST 12 MONTHS, YOU WORRIED THAT YOUR FOOD WOULD RUN OUT BEFORE YOU GOT MONEY TO BUY MORE.: NEVER TRUE

## 2024-03-25 SDOH — ECONOMIC STABILITY: INCOME INSECURITY: HOW HARD IS IT FOR YOU TO PAY FOR THE VERY BASICS LIKE FOOD, HOUSING, MEDICAL CARE, AND HEATING?: NOT HARD AT ALL

## 2024-03-25 ASSESSMENT — ENCOUNTER SYMPTOMS
VOMITING: 0
SHORTNESS OF BREATH: 0
WHEEZING: 0
COUGH: 0
CHEST TIGHTNESS: 0
CONSTIPATION: 0
DIARRHEA: 0
ABDOMINAL PAIN: 1
NAUSEA: 1

## 2024-03-25 NOTE — PROGRESS NOTES
Post-Discharge Transitional Care Follow Up      Noe Moctezuma   YOB: 1975    Date of Office Visit:  3/25/2024  Date of Hospital Admission: 3/8/24  Date of Hospital Discharge: 3/18/24  Readmission Risk Score (high >=14%. 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 His surgeries were to remove the affected part of the colon and he had a complications resulting in peritonitis and a colostomy. He will follow up with oncology for further recommendations.     S/P colostomy (HCC)  New; he is adjusting to his colostomy, but he is still really emotional about it. There is also one little unhealed sopt on his abdominal would but I'm not sure it is infected. I advised him to follow up with surgery as scheduled tomorrow for that.     Moderate episode of recurrent major depressive disorder (HCC)  Worsening; he is really upset by the cancer diagnosis and the colostomy. I restarted him on celexa for a few months.  I explained that the main side effect is GI distress. I will start with a low dose and titrate up as needed. he was instructed that it can take 4-6weeks before the medication is working to its full potential. I will see how he is doing in a month and increase the dose as needed.     -     citalopram (CELEXA) 10 MG tablet; Take 1 tablet by mouth daily, Disp-30 tablet, R-1Normal    Hospital discharge follow-up  -     NM DISCHARGE MEDS RECONCILED W/ CURRENT OUTPATIENT MED LIST      Medical Decision Making: moderate complexity  Return in about 1 month (around 4/25/2024) for Depression follow up.    On

## 2024-03-25 NOTE — CARE COORDINATION
SW mailed out ACP documents to pt and spouse and will follow up to make sure they receive and assist in completing if needed.

## 2024-03-26 ENCOUNTER — OFFICE VISIT (OUTPATIENT)
Dept: SURGERY | Age: 49
End: 2024-03-26

## 2024-03-26 VITALS
BODY MASS INDEX: 23.72 KG/M2 | OXYGEN SATURATION: 96 % | RESPIRATION RATE: 18 BRPM | SYSTOLIC BLOOD PRESSURE: 122 MMHG | TEMPERATURE: 97 F | HEART RATE: 113 BPM | HEIGHT: 78 IN | WEIGHT: 205 LBS | DIASTOLIC BLOOD PRESSURE: 84 MMHG

## 2024-03-26 DIAGNOSIS — C18.9 MALIGNANT NEOPLASM OF COLON, UNSPECIFIED PART OF COLON (HCC): Primary | ICD-10-CM

## 2024-03-26 PROCEDURE — 99024 POSTOP FOLLOW-UP VISIT: CPT | Performed by: SURGERY

## 2024-03-26 NOTE — PROGRESS NOTES
Physician Progress Note      PATIENT:               BULMARO RHODES  CSN #:                  [id number]  :                       1975  ADMIT DATE:       3/8/2024 11:34 AM  DISCH DATE:        3/18/2024 12:53 PM  RESPONDING  PROVIDER #:        Madai Vitale CNP          QUERY TEXT:    Pt admitted with Anastomotic leak of intestine Pt noted to have   Intra-abdominal Infection op notes on 3/8, WBC15.2, , RR 29If possible,   please document in the progress notes and discharge summary if you are   evaluating and /or treating any of the following:    The medical record reflects the following:  Risk Factors: Anastomotic leak of intestine.  Clinical Indicators: op notes  Gross Fecal peritonitis present at the time of   surgery PATOS. Lab values WBC15.2, , RR 29 noted on 3/8., No CRP, No   Procal, No Lactic Repeated WBC: 7.0, 11.4, 11.2, 11.7, 14.2, 14.2, 15. 2  24: TEMP: 99.0, : 98.4, 03/15/24: 99.3, Pulse: 80, 84, 91, 66,  Treatment: piperacillin-tazobactam. OP procedure: exploratory Lap,    Chetna Orellana, RN-BSN, CRCR  Clinical   [email or web address]  [employer]  Options provided:  -- Sepsis is due to Intra-abdominal Infection confirmed, present on admission  -- Intra-abdominal Infection without Sepsis  -- Other - I will add my own diagnosis  -- Disagree - Not applicable / Not valid  -- Disagree - Clinically unable to determine / Unknown  -- Refer to Clinical Documentation Reviewer    PROVIDER RESPONSE TEXT:    This patient has sepsis is due to Intra-abdominal Infection confirmed, which   was present on admission.    Query created by: Barbara Bennett on 3/26/2024 9:07 AM      Electronically signed by:  Madai Vitale CNP 3/26/2024 10:23 AM

## 2024-03-27 ENCOUNTER — CARE COORDINATION (OUTPATIENT)
Dept: CASE MANAGEMENT | Age: 49
End: 2024-03-27

## 2024-03-27 ENCOUNTER — TELEPHONE (OUTPATIENT)
Dept: SURGERY | Age: 49
End: 2024-03-27

## 2024-03-27 NOTE — CARE COORDINATION
Care Transitions Follow Up Call    Patient Current Location:  Home: [address] [place] [place] [address]    Care Transition Nurse contacted the patient by telephone to follow up after admission on 3/8/24.  Verified name and  with patient as identifiers.    Patient: Noe Moctezuma  Patient : 1975   MRN: [id number]  Reason for Admission:  s/p colostomy   Discharge Date: 3/18/24 RARS: Readmission Risk Score: 7.3      Needs to be reviewed by the provider   Additional needs identified to be addressed with provider: No  none             Method of communication with provider: none.    Spoke with Corey, said he is doing pretty good.  Denies fever, chills, dyspnea, abd pain.  Colostomy is functioning well.  Saw PCP Monday, started Celexa.  Started feeling a little depressed in the hospital, a lot happened in 20 days.  Lifestyle change with colostomy.  Saw surgeon yesterday, no changes, will see again in 3 wks.  Will see wound clinic next week.  Eating, drinking, sleeping good.  No other issues to report.  Denies any other needs.  No other questions or concerns at this time.  Will continue to follow.  Appreciative.    Addressed changes since last contact:  medications-Celexa  Discussed follow-up appointments. If no appointment was previously scheduled, appointment scheduling offered: Yes.   Is follow up appointment scheduled within 7 days of discharge? Yes. 3/25    Follow Up  Future Appointments   Date Time Provider Department Center   2024  8:30 AM Caroline Bernal APRN - CNP [facility] [place] [facility]   2024 10:00 AM Caroline Covington MD [facility] - [place]   2024  1:00 PM Caroline Bernal APRN - CNP [facility] [place] [facility]   5/3/2024  9:20 AM Iliana Mejia MD [facility]     External follow up appointment(s): kristopher    Care Transition Nurse reviewed medical action plan and red flags with patient and discussed any barriers to care and/or understanding of plan of care after discharge. Discussed appropriate site of

## 2024-03-27 NOTE — TELEPHONE ENCOUNTER
Patient was referred to Dr. Granger.  Records faxed.  Manuela at Dr. Granger's office called to advise that Dr. Granegr reviewed all of the patient's records.  Corey will not need chemotherapy.  Dr. Granger wanted to let us know as there is no reason for the patient to drive all the way to [place] to be told he was not going to require Chemotherapy.    I contacted the patient of this.  He was very happy with this news.  I did offer if he wanted to see another oncologist that Dr. Covington would be happy to refer him.  Patient trusts Dr. Granger.  Corey was instructed to contact the office if any problems or questions would present.

## 2024-03-29 ENCOUNTER — TELEPHONE (OUTPATIENT)
Dept: FAMILY MEDICINE CLINIC | Age: 49
End: 2024-03-29

## 2024-03-29 DIAGNOSIS — K65.8: ICD-10-CM

## 2024-03-29 DIAGNOSIS — C18.9 MALIGNANT NEOPLASM OF COLON, UNSPECIFIED PART OF COLON (HCC): ICD-10-CM

## 2024-03-29 DIAGNOSIS — Z48.3 AFTERCARE FOLLOWING SURGERY FOR NEOPLASM: Primary | ICD-10-CM

## 2024-03-29 DIAGNOSIS — Z43.3 ENCOUNTER FOR ATTENTION TO COLOSTOMY (HCC): ICD-10-CM

## 2024-03-29 RX ORDER — OXYCODONE HYDROCHLORIDE 5 MG/1
5 TABLET ORAL EVERY 6 HOURS PRN
COMMUNITY

## 2024-03-29 NOTE — TELEPHONE ENCOUNTER
Home health certification and plan of care done 3/29/2024 on patient for date services 3/20/2024-5/18/2024. Verified medications. Physician time spent is 15 minutes for activities to coordinate services, documenting, medical decision making, and review of reports, treatment plans, and test results.

## 2024-04-02 ENCOUNTER — HOSPITAL ENCOUNTER (OUTPATIENT)
Dept: WOUND CARE | Age: 49
Discharge: HOME | End: 2024-04-02
Attending: NURSE PRACTITIONER
Payer: COMMERCIAL

## 2024-04-02 VITALS
SYSTOLIC BLOOD PRESSURE: 153 MMHG | RESPIRATION RATE: 16 BRPM | DIASTOLIC BLOOD PRESSURE: 96 MMHG | HEART RATE: 62 BPM | TEMPERATURE: 97.7 F | OXYGEN SATURATION: 98 %

## 2024-04-02 DIAGNOSIS — Z71.89 ENCOUNTER FOR OSTOMY CARE EDUCATION: ICD-10-CM

## 2024-04-02 DIAGNOSIS — L30.9 PERISTOMAL DERMATITIS: ICD-10-CM

## 2024-04-02 DIAGNOSIS — Z43.3 COLOSTOMY CARE (HCC): Primary | ICD-10-CM

## 2024-04-02 PROCEDURE — 2500000003 HC RX 250 WO HCPCS: Performed by: NURSE PRACTITIONER

## 2024-04-02 PROCEDURE — 99214 OFFICE O/P EST MOD 30 MIN: CPT | Performed by: NURSE PRACTITIONER

## 2024-04-02 PROCEDURE — 99214 OFFICE O/P EST MOD 30 MIN: CPT

## 2024-04-02 RX ADMIN — MICONAZOLE NITRATE: 2 POWDER TOPICAL at 08:58

## 2024-04-02 NOTE — PATIENT INSTRUCTIONS
Visit Discharge/ physician orders:    [facility]: Will need to order new supplies listed below.   - Put sticker over filter when showering. Remove when shower is done.   - Clip hair around stoma when needed. Do not shave with razor.     Supplies   Size   Order #     Seng soft convex flange  (red) Cut to fit 35x38.  75421   [place] drainable pouch (red)   64831   Seng closed pouch (red)   36728   Adapt Skin Barrier Ring or   Brava protective seal  8805  80022    Adapt Barrier Extenders  92909     Adapt Stoma Powder      7906     Change your flange 1-2   times / week and when leaking    Application of two Piece Colostomy/Ileostomy Pouch:  Assemble above supplies in order of application before removing pouch.  If not pre-cut: Cut a hole in the flange to fit the size of your stoma. Remove paper backing.  Remove your worn appliance by gently pulling away from skin and discard.  Wash skin with warm water, rinse and pat dry.  DO NOT USE SOAP!  Inspect your skin for redness or irritation.  If present, apply a small amount of powder to skin around stoma.  Brush off excess powder with a Kleenex. Do not use ointments.  ___ Brava Stoma Powder (94443)         (for wet, weepy skin)    _X__ Desenex Powder to skin around stoma until area clears up.   ____    Antifungal powder (for red,itchy rash)  Mold ring to the inner edge of the flange or around stoma itself.   Center the flange around your stoma.  Smooth the adhesive collar to your abdomen.  Apply your pouch, making sure the end of the pouch is closed.  Apply barrier extenders around outer edge of flange for added security.  Empty when 1/3 full.      Follow up: 4 weeks May 1st at 1:00 pm     [facility]  [address] [place] [address]  [phone]

## 2024-04-02 NOTE — PLAN OF CARE
Problem: Skin/Tissue Integrity  Goal: Absence of new skin breakdown  Description: 1.  Monitor for areas of redness and/or skin breakdown  2.  Assess vascular access sites hourly  3.  Every 4-6 hours minimum:  Change oxygen saturation probe site  4.  Every 4-6 hours:  If on nasal continuous positive airway pressure, respiratory therapy assess nares and determine need for appliance change or resting period.  Outcome: Progressing   Pt. Seen today for ostomy follow up see AVS for new orders. Follow up in 2 weeks.  Care plan reviewed with patient and wife.  Patient and wife verbalize understanding of the plan of care and contribute to goal setting.

## 2024-04-03 ENCOUNTER — CARE COORDINATION (OUTPATIENT)
Dept: CASE MANAGEMENT | Age: 49
End: 2024-04-03

## 2024-04-03 NOTE — PROGRESS NOTES
MD MARY Aguiar DR GENERAL SURGERY  General [facility] Post op Note    Pt Name: Noe Moctezuma  Medical Record Number: [id number]  Date of Birth 1975   Today's Date: 4/3/2024    ASSESSMENT       ICD-10-CM    1. Malignant neoplasm of colon, unspecified part of colon (HCC)  C18.9 ZULEIKA - [place], [place], , Hematology & Oncology, [place]           PLAN   Patient making a good recovery, really is disappointed, this should continue to improve with time.  We reviewed he is reversible I do expect any complications.        I have referred him to oncology however I do not anticipate any chemotherapy will be recommended.     SUBJECTIVE   Noe is doing okay, his ostomy is functioning.  He is struggling with his recent diagnosis followed by his complication.  Overall he is doing well though.  He is taking antidepressant..     CURRENT MEDICATIONS     Current Outpatient Medications on File Prior to Visit   Medication Sig Dispense Refill    ondansetron (ZOFRAN-ODT) 4 MG disintegrating tablet Take 1 tablet by mouth 3 times daily as needed for Nausea or Vomiting 21 tablet 0    citalopram (CELEXA) 10 MG tablet Take 1 tablet by mouth daily (Patient not taking: Reported on 4/2/2024) 30 tablet 1    acetaminophen (TYLENOL) 500 MG tablet Take 1 tablet by mouth every 6 hours as needed for Pain       No current facility-administered medications on file prior to visit.       OBJECTIVE   CURRENT VITALS:  height is 2.108 m (6' 11\") and weight is 93 kg (205 lb). His temporal temperature is 97 °F (36.1 °C). His blood pressure is 122/84 and his pulse is 113 (abnormal). His respiration is 18 and oxygen saturation is 96%.     Physical Exam  Cardiovascular:      Rate and Rhythm: Normal rate.      Pulses: Normal pulses.   Abdominal:      Hernia: No hernia is present.          Comments: Incision clean minimal drainage and a small location.  Ostomy is pink patent and producing.   Neurological:      Mental Status: He is alert.

## 2024-04-03 NOTE — CARE COORDINATION
Care Transitions Follow Up Call    Patient Current Location:  Home: [address] [place] [address]    Care Transition Nurse contacted the patient by telephone to follow up after admission on 3/8/24.  Verified name and  with patient as identifiers.    Patient: Noe Moctezuma  Patient : 1975   MRN: [id number]  Reason for Admission: anastomic leak s/p ex lap part bowel resection colon CA   Discharge Date: 3/18/24 RARS: Readmission Risk Score: 7.3      Needs to be reviewed by the provider   Additional needs identified to be addressed with provider: No  none             Method of communication with provider: none.    CTN call to Corey today and he is in good spirits, says he feels better. Surgery office notified him that he does not need to see Oncology.  Colostomy intact draining brown stool.  Probably a few months for reversal.  Surgery 3/26/24-> staples out f/u 24  [facility] active  [facility] 24->colostomy care /education. F/u 24  PCP f/u 5/3/24  Denies n/v/d, abd pain, fever, chills, sob, chest pain, dizziness, malaise, swelling.  Incision healed-no redness, swelling, drainage.  Did not start Celexa, says mood much better, no depression at this time. Sleeping better as well.  Thankful for care and prayer support.  Eating, drinking ok. Denies problems w/ urination. No other concerns voiced at this time. Will continue to follow.    Addressed changes since last contact:  medications-Celexa-on hold  Discussed follow-up appointments. If no appointment was previously scheduled, appointment scheduling offered: Yes.   Is follow up appointment scheduled within 7 days of discharge? Yes.    Follow Up  Future Appointments   Date Time Provider Department Center   2024 10:00 AM Caroline Covington MD N Adv Surg [facility] [place]   2024  1:00 PM Caroline Bernal APRN - CNP [facility] [place]   5/3/2024  9:20 AM Iliana Mejia MD [facility]     External follow up appointment(s):     Care Transition Nurse

## 2024-04-09 ENCOUNTER — CARE COORDINATION (OUTPATIENT)
Dept: CASE MANAGEMENT | Age: 49
End: 2024-04-09

## 2024-04-09 NOTE — CARE COORDINATION
Care Transitions Follow Up Call    Patient Current Location:  Home: [address] [place] [address]    Care Transition Nurse contacted the patient by telephone to follow up after admission on 3/8/24.  Verified name and  with patient as identifiers.    Patient: Noe Moctezuma  Patient : 1975   MRN: [id number]  Reason for Admission: anastomic leak s/p ex lap part bowel resection colon CA, s/p colostomy   Discharge Date: 3/18/24 RARS: Readmission Risk Score: 7.3      Needs to be reviewed by the provider   Additional needs identified to be addressed with provider: No  none             Method of communication with provider: none.    Spoke with Corey, said he is feeling pretty good.  Colostomy intact, draining brown stool.  Denies abd pain, n/v/d, fever, chills, dyspnea, chest pain, dizziness.  Has something pointy in the tissue of incision.  Is not sure if it could be a staple missed but will see surgeon next week, is not too worried about it.  HH continues to follow.  Eating, drinking, sleeping good.  No other issues to report.  Denies any other needs.  No other questions or concerns at this time.  Will continue to follow for one more call.    Addressed changes since last contact:  none  Discussed follow-up appointments. If no appointment was previously scheduled, appointment scheduling offered: Yes.   Is follow up appointment scheduled within 7 days of discharge? Yes.    Follow Up  Future Appointments   Date Time Provider Department Center   2024 10:00 AM Caroline Covington MD N Adv S[facility] [place]   2024  1:00 PM Caroline Bernal APRN - CNP [facility] [place]   5/3/2024  9:20 AM Iliana Mejia MD [facility]     External follow up appointment(s): kristopher    Care Transition Nurse reviewed medical action plan and red flags with patient and discussed any barriers to care and/or understanding of plan of care after discharge. Discussed appropriate site of care based on symptoms and resources available to

## 2024-04-12 ENCOUNTER — PATIENT MESSAGE (OUTPATIENT)
Dept: FAMILY MEDICINE CLINIC | Age: 49
End: 2024-04-12

## 2024-04-12 NOTE — TELEPHONE ENCOUNTER
From: Noe Moctezuma  To: Dr. Iliana Mejia  Sent: 4/12/2024 1:47 PM EDT  Subject: Blood pressure     Monitoring my blood pressure and I have been averaging around diastolic 90-94 from a bottom number! Today was 148/95. Going to monitor for the next few days and asking if I should follow up? Concern?

## 2024-04-15 RX ORDER — LISINOPRIL 10 MG/1
10 TABLET ORAL DAILY
Qty: 30 TABLET | Refills: 1 | Status: SHIPPED | OUTPATIENT
Start: 2024-04-15

## 2024-04-16 ENCOUNTER — OFFICE VISIT (OUTPATIENT)
Dept: SURGERY | Age: 49
End: 2024-04-16

## 2024-04-16 VITALS
HEIGHT: 78 IN | HEART RATE: 69 BPM | SYSTOLIC BLOOD PRESSURE: 134 MMHG | WEIGHT: 210.5 LBS | BODY MASS INDEX: 24.35 KG/M2 | OXYGEN SATURATION: 98 % | TEMPERATURE: 97.8 F | DIASTOLIC BLOOD PRESSURE: 92 MMHG

## 2024-04-16 DIAGNOSIS — Z09 POSTOP CHECK: Primary | ICD-10-CM

## 2024-04-16 PROCEDURE — 99024 POSTOP FOLLOW-UP VISIT: CPT | Performed by: SURGERY

## 2024-04-17 ENCOUNTER — CARE COORDINATION (OUTPATIENT)
Dept: CASE MANAGEMENT | Age: 49
End: 2024-04-17

## 2024-04-17 NOTE — CARE COORDINATION
Care Transitions Note    Follow Up Call  -1st attempt    Attempted to reach patient for transitions of care follow up.  Unable to reach patient.      Outreach Attempts:   HIPAA compliant voicemail left for patient.       Follow Up Appointment:   Future Appointments         Provider Specialty Dept Phone    5/1/2024 1:00 PM Caroline Bernal, APRN - CNP Wound Ostomy [phone]    5/3/2024 9:20 AM Iliana Mejia MD Family Medicine [phone]    9/17/2024 10:00 AM Caroline Covington MD General Surgery [phone]            Plan for follow-up on next business day.  based on severity of symptoms and risk factors. Plan for next call: symptom management-new or worsening symptoms  Final call    Anuja Thompson RN

## 2024-04-17 NOTE — CARE COORDINATION
Care Transitions Note    Final Call      Patient Current Location:  Home: [address] [place] [place] [address]    Care Transition Nurse contacted the patient by telephone. Verified name and  as identifiers.    Patient graduated from the Care Transitions program on 24.  Patient/family verbalizes confidence in the ability to self-manage at this time. has the ability to self manage at this time..      Advance Care Planning:   Does patient have an Advance Directive: reviewed during previous call, see note. .    Handoff:   Patient was not referred to the ACM team due to no additional needs identified.       Care Summary Note:   Corey returned call, said he is doing well.  Colostomy is functioning without difficulty.  Taking care of it himself.  Saw surgeon last week, discussed incision with something pointy, said it is probably an internal suture not laying right and will take care of it with the reversal in Sept.  Said it may be bothersome but not to worry about it.  Denies abd pain, n/v/d, fever, chills, chest pain, dyspnea.  Eating, drinking, sleeping good.  He was having issues with BP, DBP above 90.  PCP started lisinopril, just started yesterday.  Surgeon said it is probably still related to the trauma he went thru, his body is still healing on the inside.  He will continue to monitor BP and update PCP if needed.  No other issues to report.  Denies any other needs.  No other questions or concerns at this time.  Very appreciative of calls.    Assessments:  Care Transitions Subsequent and Final Call    Subsequent and Final Calls  Do you have any ongoing symptoms?: No  Have your medications changed?: No  Do you have any questions related to your medications?: No  Do you currently have any active services?: Yes  Are you currently active with any services?: Home Health  Do you have any needs or concerns that I can assist you with?: No  Identified Barriers: Lack of Education  Care Transitions Interventions   Home Care

## 2024-04-22 ENCOUNTER — CARE COORDINATION (OUTPATIENT)
Dept: CARE COORDINATION | Age: 49
End: 2024-04-22

## 2024-04-22 NOTE — CARE COORDINATION
SW called pt to follow up with ACP documents.Introduced self and my role and inquired if pt received documents.  Pt stated they did receive and will be completing because he \"has another surgery coming up.\" Inquired if pt has any questions in regard to completing. Pt stated, \"no they are pretty self explanatory.\" Advised pt to call SW at [phone] for any assistance.  Pt voiced understanding. Advised pt to take documents to PCP have scanned into chart and originals will be given back to pt. Pt agreed to do so. Will resolve at this time.

## 2024-04-25 ENCOUNTER — TELEPHONE (OUTPATIENT)
Dept: SURGERY | Age: 49
End: 2024-04-25

## 2024-04-25 DIAGNOSIS — C18.9 MALIGNANT NEOPLASM OF COLON, UNSPECIFIED PART OF COLON (HCC): Primary | ICD-10-CM

## 2024-04-25 RX ORDER — AMOXICILLIN AND CLAVULANATE POTASSIUM 875; 125 MG/1; MG/1
1 TABLET, FILM COATED ORAL 2 TIMES DAILY
Qty: 20 TABLET | Refills: 0 | OUTPATIENT
Start: 2024-04-25 | End: 2024-05-05

## 2024-04-25 NOTE — TELEPHONE ENCOUNTER
Augmentin 875mg bid #20 w/ NRF called into [facility] in [facility].  Patient notified.

## 2024-04-25 NOTE — PROGRESS NOTES
Per Dr. Covington Augment 875mg bid #20 called into [facility] in [facility].  Patient notified

## 2024-04-30 NOTE — PROGRESS NOTES
Caroline Covington MD  [facility]  General Surgery  Clinic Post op Note    Pt Name: Noe Moctezuma  Medical Record Number: [id number]  Date of Birth 1975   Today's Date: 4/30/2024    ASSESSMENT       ICD-10-CM    1. Postop check  Z09            PLAN   Overall Corey is making a good recovery, his ostomy is functioning.  He had been suffering with some depression that was given start medication but he is held off as he is starting to feel more like himself.  He has spoken with oncology he does agrees that he does not need chemotherapy.  He is getting back to his activity and strength back, he is having regular diet.  He has recently diagnosed with hypertension that he was question of this could be secondary to the surgery, we reviewed the unlikeliness of this, he is happy with that discussion.  Ultimately he would like to have a colostomy reversal in the future.  He is a good candidate.  Would like to give it a total of 3 to 4 months to allow all of his adhesions to heal.  I will see him back for formal discussion prior to reversal.  SUBJECTIVE   Noe is doing well, his pain is controlled, he is having stoma output.  He is starting to feel more like himself and is putting weight back on..           CURRENT MEDICATIONS     Current Outpatient Medications on File Prior to Visit   Medication Sig Dispense Refill    acetaminophen (TYLENOL) 500 MG tablet Take 1 tablet by mouth every 6 hours as needed for Pain      lisinopril (PRINIVIL;ZESTRIL) 10 MG tablet Take 1 tablet by mouth daily (Patient not taking: Reported on 4/16/2024) 30 tablet 1    ondansetron (ZOFRAN-ODT) 4 MG disintegrating tablet Take 1 tablet by mouth 3 times daily as needed for Nausea or Vomiting (Patient not taking: Reported on 4/16/2024) 21 tablet 0     No current facility-administered medications on file prior to visit.       OBJECTIVE   CURRENT VITALS:  height is 2.108 m (6' 10.99\") and weight is 95.5 kg (210 lb 8 oz). His temporal

## 2024-05-01 ENCOUNTER — HOSPITAL ENCOUNTER (OUTPATIENT)
Dept: WOUND CARE | Age: 49
Discharge: HOME | End: 2024-05-01
Attending: NURSE PRACTITIONER
Payer: COMMERCIAL

## 2024-05-01 VITALS
RESPIRATION RATE: 16 BRPM | HEART RATE: 79 BPM | OXYGEN SATURATION: 97 % | DIASTOLIC BLOOD PRESSURE: 83 MMHG | TEMPERATURE: 97.4 F | SYSTOLIC BLOOD PRESSURE: 123 MMHG

## 2024-05-01 DIAGNOSIS — Z43.3 COLOSTOMY CARE (HCC): Primary | ICD-10-CM

## 2024-05-01 DIAGNOSIS — Z71.89 ENCOUNTER FOR OSTOMY CARE EDUCATION: ICD-10-CM

## 2024-05-01 PROBLEM — K92.1 HEMATOCHEZIA: Status: RESOLVED | Noted: 2020-09-28 | Resolved: 2024-05-01

## 2024-05-01 PROBLEM — R10.32 LEFT LOWER QUADRANT PAIN: Status: RESOLVED | Noted: 2020-09-28 | Resolved: 2024-05-01

## 2024-05-01 PROCEDURE — 99212 OFFICE O/P EST SF 10 MIN: CPT

## 2024-05-01 NOTE — PROGRESS NOTES
Clinical Level of Care Assessment    Outpatient Ostomy Care      NAME:  Noe Moctezuma  YOB: 1975  MEDICAL RECORD NUMBER:  [id number]   DATE:  5/1/2024      Patient /Ostomy Assessment- Document in Flowsheet I&O   Points   Review of chart []   0   Assess the Complete Ostomy tab in Navigator for assessment of; stoma status, peristomal skin, presence of hernia/stool consistency/diet/related medications.   Simple adjustments to pouch size/pouch system, new stoma pattern, accessory addition/deletion.   []   1   Assess the Complete Ostomy tab in Navigator for assessment of; stoma status, peristomal skin, presence of hernia/stool consistency/diet/related medications.   Moderate adjustments to pouch size/pouch system, new stoma pattern, and accessory addition/deletion.  Observe patient/caregiver with hands-on care.   1-2 adjustments to pouch size/system/skincare/accessory addition or deletion.    [x]   2   Assess the Complete Ostomy tab in Navigator for assessment of; stoma status, peristomal skin, presence of hernia/stool consistency/diet/related medications.   Complex adjustments to pouch size/pouch system, new stoma pattern, accessory addition/deletion.  3 or more complex adjustments to pouch size/system/skincare/accessory addition or deletion.  Observe patient/caregiver with hands-on care.   Assess patient/patient's abdomen for optimal pre-marked stoma site.  Assess the patient's abdomen for a type of hernia belt/accessory needed. []   3         Ambulation Status Documented in CN Clinical Note  Status Definition Points   Independent Independently able to ambulate.  Fully able (without any assistance) to get on/off the exam table/chair.    [x]   0   Minimal Physical Assistance Requires physical assistance of one person to ambulate and position the patient to be examined. Includes necessary physical assistance to position lower extremities on/off the stool.   []   1   Moderate Physical Assistance

## 2024-05-01 NOTE — PATIENT INSTRUCTIONS
- Put sticker over filter when showering. Remove when shower is done.   - Clip hair around stoma when needed. Do not shave with razor.   - recommend wearing hernia belt as needed for extra support    Supplies   Size   Order #     Coloplast sensura natali flex flat barrier Pre-Cut to 40 [id number]   Sensura natali flex MAXI drainable pouch  10065   Sensura natali flex MAXI closed pouch  88794   Brava protective seal  29744   Adapt Barrier Extenders  46484     Adapt Stoma Powder      7906     Change your flange 1-2   times / week and when leaking    Application of two Piece Colostomy Pouch:  Assemble above supplies in order of application before removing pouch.  If not pre-cut: Cut a hole in the flange to fit the size of your stoma. Remove paper backing.  Remove your worn appliance by gently pulling away from skin and discard.  Wash skin with warm water, rinse and pat dry.  DO NOT USE SOAP!  Inspect your skin for redness or irritation.  If present, apply a small amount of powder to skin around stoma.  Brush off excess powder with a Kleenex. Do not use ointments.  _x__ Brava Stoma Powder (84666)         (for wet, weepy skin)    _X__ Desenex Powder to skin around stoma until area clears up.   ____    Antifungal powder (for red,itchy rash)  Mold ring to the inner edge of the flange or around stoma itself.   Center the flange around your stoma.  Smooth the adhesive collar to your abdomen.  Apply your pouch, making sure the end of the pouch is closed.  Apply barrier extenders around outer edge of flange for added security.  Empty when 1/3 full.      Follow up: as needed      [facility]  [address] [place] [id number] [phone]

## 2024-05-01 NOTE — PROCEDURES
[facility]  Ostomy Progress Note      NAME:  Noe Moctezuma  MEDICAL RECORD NUMBER:  [id number]  AGE: 48 y.o.   GENDER:  male  :  1975  TODAY'S DATE:  2024    Subjective       Chief Complaint   Patient presents with    Other     ostomy         HISTORY of PRESENT ILLNESS     Noe Moctezuma is a 48 y.o. male established patient  who presents today for ostomy/stoma evaluation.     History of Ostomy Context: Patient presents today for ostomy care education and evaluation s/p exploratory lap, takedown and resection of leaking anastomosis with diverting colostomy 3/8/24  per Dr. Dr. Fierro secondary to adenocarcinoma.  Current ostomy care includes Coloplast, 2 piece flat cut to fit pouch with barrier ring and barrier extenders.   He reports wear time of 3-4 days.  He reports good output from stoma, empties 1-2 times per day.  Has been completing ostomy care independently at home. He denies any further needs or concerns.    PAST MEDICAL HISTORY        Diagnosis Date    Allergic rhinitis     spring allergies    Arthritis     hands    Cancer (HCC) 2024    colon cancer    Colitis     Diverticulitis     Hyperlipidemia        PAST SURGICAL HISTORY    Past Surgical History:   Procedure Laterality Date    APPENDECTOMY  2023    COLONOSCOPY      COLONOSCOPY  2020    [id number]    COLONOSCOPY  2024    Dr. Vasquez    CYSTOSCOPY Bilateral 2024    CYSTOSCOPY URETERAL STENT INSERTION, kim placement performed by Jos Ng MD at [facility] OR    HERNIA REPAIR  2007    LAPAROTOMY N/A 3/8/2024    Exploratory Laparotomy, Diverting Colostomy performed by Wale Fierro MD at [facility] OR    SIGMOID COLECTOMY N/A 2024    Robotic Left Sigmoid Colon Resection attempted, converted to open procedure performed by Caroline Covington MD at [facility] OR       FAMILY HISTORY    Family History   Problem Relation Age of Onset    Lymphoma Mother     High Cholesterol Father

## 2024-05-01 NOTE — PLAN OF CARE
Problem: Skin/Tissue Integrity  Goal: Absence of new skin breakdown  Description: 1.  Monitor for areas of redness and/or skin breakdown  2.  Assess vascular access sites hourly  3.  Every 4-6 hours minimum:  Change oxygen saturation probe site  4.  Every 4-6 hours:  If on nasal continuous positive airway pressure, respiratory therapy assess nares and determine need for appliance change or resting period.  Outcome: Adequate for Discharge   Pt. Seen today for ostomy follow up see AVS for orders. Follow up as needed.  Care plan reviewed with patient.  Patient verbalize understanding of the plan of care and contribute to goal setting.

## 2024-05-03 ENCOUNTER — TELEPHONE (OUTPATIENT)
Dept: WOUND CARE | Age: 49
End: 2024-05-03

## 2024-05-03 ENCOUNTER — TELEPHONE (OUTPATIENT)
Dept: FAMILY MEDICINE CLINIC | Age: 49
End: 2024-05-03

## 2024-05-03 DIAGNOSIS — Z43.3 COLOSTOMY CARE (HCC): Primary | ICD-10-CM

## 2024-05-03 NOTE — TELEPHONE ENCOUNTER
----- Message from Rosa Duvall sent at 5/3/2024  8:58 AM EDT -----   [phone] STATES THAT [employer] WILL NOT SEND SUPPLIES FOR HIM, THEY SAY RAMÓN HAS TO SEND, BUT HE ONLY NEEDS 4 OF THE 6 ITEMS. REQUESTS A CALL BACK, PRIOR TO ORDERING FOR HIM. ALSO STATES [employer] FAX [phone]

## 2024-05-03 NOTE — TELEPHONE ENCOUNTER
Patient states he needs the top 4 supplies on his list faxed to [facility]. Orders entered by Angelita DAY and faxed to [facility] as requested.

## 2024-05-06 ENCOUNTER — TELEPHONE (OUTPATIENT)
Dept: WOUND CARE | Age: 49
End: 2024-05-06

## 2024-05-06 NOTE — TELEPHONE ENCOUNTER
Returned call, patient states he has not received his supplies and when he called the company they had not received an order. Order re faxed at this time. Told patient to call and check tomorrow to see if company received order and if not ask for another fax number that we can send order too. Patient voiced understanding

## 2024-05-08 ENCOUNTER — TELEPHONE (OUTPATIENT)
Dept: WOUND CARE | Age: 49
End: 2024-05-08

## 2024-05-08 NOTE — TELEPHONE ENCOUNTER
Returned call to patient regarding supplies. [facility] states they do not have ostomy supplies and offered to send order to [facility]. Patient agreeable. Order faxed over

## 2024-05-14 ENCOUNTER — TELEPHONE (OUTPATIENT)
Dept: WOUND CARE | Age: 49
End: 2024-05-14

## 2024-05-14 RX ORDER — LISINOPRIL 10 MG/1
10 TABLET ORAL DAILY
Qty: 90 TABLET | Refills: 1 | Status: SHIPPED | OUTPATIENT
Start: 2024-05-14

## 2024-05-14 NOTE — TELEPHONE ENCOUNTER
Noe called requesting a refill of the below medication which has been pended for you:     Requested Prescriptions     Pending Prescriptions Disp Refills    lisinopril (PRINIVIL;ZESTRIL) 10 MG tablet [Pharmacy Med Name: LISINOPRIL 10MG TABLETS] 90 tablet      Sig: TAKE 1 TABLET BY MOUTH DAILY       Last Appointment Date: 3/25/2024  Next Appointment Date: 6/28/2024    No Known Allergies

## 2024-05-14 NOTE — TELEPHONE ENCOUNTER
Received call from patient regarding ostomy supplies. Called [employer] and they stated they did not have enough information. Faxed over order and patient's information. Called patient back and gave him the number for [employer] to have him check on order in the next day or two. Patient also asked questions about his stoma size. Patient request to get an appointment scheduled to have us look at his stoma to make sure everything is ok. Appointment made.

## 2024-05-22 DIAGNOSIS — F33.1 MODERATE EPISODE OF RECURRENT MAJOR DEPRESSIVE DISORDER (HCC): ICD-10-CM

## 2024-05-22 RX ORDER — CITALOPRAM HYDROBROMIDE 10 MG/1
10 TABLET ORAL DAILY
Qty: 30 TABLET | Refills: 1 | OUTPATIENT
Start: 2024-05-22

## 2024-05-30 ENCOUNTER — HOSPITAL ENCOUNTER (OUTPATIENT)
Dept: WOUND CARE | Age: 49
Discharge: HOME | End: 2024-05-30
Attending: NURSE PRACTITIONER
Payer: COMMERCIAL

## 2024-05-30 VITALS
RESPIRATION RATE: 16 BRPM | OXYGEN SATURATION: 98 % | DIASTOLIC BLOOD PRESSURE: 91 MMHG | SYSTOLIC BLOOD PRESSURE: 148 MMHG | HEART RATE: 58 BPM | TEMPERATURE: 97.7 F

## 2024-05-30 DIAGNOSIS — K43.5 PARASTOMAL HERNIA WITHOUT OBSTRUCTION OR GANGRENE: Primary | ICD-10-CM

## 2024-05-30 DIAGNOSIS — Z43.3 COLOSTOMY CARE (HCC): ICD-10-CM

## 2024-05-30 PROCEDURE — 99212 OFFICE O/P EST SF 10 MIN: CPT

## 2024-05-30 PROCEDURE — 99213 OFFICE O/P EST LOW 20 MIN: CPT | Performed by: NURSE PRACTITIONER

## 2024-05-30 NOTE — PROGRESS NOTES
Clinical Level of Care Assessment    Outpatient Ostomy Care      NAME:  Noe Moctezuma  YOB: 1975  MEDICAL RECORD NUMBER:  [id number]   DATE:  5/30/2024      Patient /Ostomy Assessment- Document in Flowsheet I&O   Points   Review of chart []   0   Assess the Complete Ostomy tab in Navigator for assessment of; stoma status, peristomal skin, presence of hernia/stool consistency/diet/related medications.   Simple adjustments to pouch size/pouch system, new stoma pattern, accessory addition/deletion.   [x]   1   Assess the Complete Ostomy tab in Navigator for assessment of; stoma status, peristomal skin, presence of hernia/stool consistency/diet/related medications.   Moderate adjustments to pouch size/pouch system, new stoma pattern, and accessory addition/deletion.  Observe patient/caregiver with hands-on care.   1-2 adjustments to pouch size/system/skincare/accessory addition or deletion.    []   2   Assess the Complete Ostomy tab in Navigator for assessment of; stoma status, peristomal skin, presence of hernia/stool consistency/diet/related medications.   Complex adjustments to pouch size/pouch system, new stoma pattern, accessory addition/deletion.  3 or more complex adjustments to pouch size/system/skincare/accessory addition or deletion.  Observe patient/caregiver with hands-on care.   Assess patient/patient's abdomen for optimal pre-marked stoma site.  Assess the patient's abdomen for a type of hernia belt/accessory needed. []   3         Ambulation Status Documented in CN Clinical Note  Status Definition Points   Independent Independently able to ambulate.  Fully able (without any assistance) to get on/off the exam table/chair.    [x]   0   Minimal Physical Assistance Requires physical assistance of one person to ambulate and position the patient to be examined. Includes necessary physical assistance to position lower extremities on/off the stool.   []   1   Moderate Physical Assistance

## 2024-05-30 NOTE — PLAN OF CARE
Problem: Chronic Conditions and Co-morbidities  Goal: Patient's chronic conditions and co-morbidity symptoms are monitored and maintained or improved  Outcome: Progressing     Patient seen at the wound and ostomy clinic today for evaluation of colostomy, please see AVS. Care plan reviewed with patient.  Patient verbalizes understanding of the plan of care and contribute to goal setting.

## 2024-05-30 NOTE — PATIENT INSTRUCTIONS
- Order Ostomy Belt through [place]-follow instructions to cut to fit, if you have any issues, please contact the office and we'll cut to fit in office.   - Recommend daily use of ostomy belt.   - Put sticker over filter when showering. Remove when shower is done.   - Clip hair around stoma when needed. Do not shave with razor.       Supplies   Size   Order #     Coloplast sensura natali flex flat barrier Pre-Cut to 40 [id number]   Sensura natali flex MAXI drainable pouch  84634   Sensura natali flex MAXI closed pouch  15456   Brava protective seal  46152   Adapt Barrier Extenders  58659     Adapt Stoma Powder      7906   Brava Ostomy Support Belt  2XL      Change your flange 1-2   times / week and when leaking    Application of two Piece Colostomy Pouch:  Assemble above supplies in order of application before removing pouch.  If not pre-cut: Cut a hole in the flange to fit the size of your stoma. Remove paper backing.  Remove your worn appliance by gently pulling away from skin and discard.  Wash skin with warm water, rinse and pat dry.  DO NOT USE SOAP!  Inspect your skin for redness or irritation.  If present, apply a small amount of powder to skin around stoma.  Brush off excess powder with a Kleenex. Do not use ointments.  _x__ Brava Stoma Powder (73133)         (for wet, weepy skin)    _X__ Desenex Powder to skin around stoma until area clears up.   ____    Antifungal powder (for red,itchy rash)  Mold ring to the inner edge of the flange or around stoma itself.   Center the flange around your stoma.  Smooth the adhesive collar to your abdomen.  Apply your pouch, making sure the end of the pouch is closed.  Apply barrier extenders around outer edge of flange for added security.  Empty when 1/3 full.      Follow up: as needed      [facility]  [address] [place] [address]  [phone]

## 2024-05-30 NOTE — PROGRESS NOTES
[facility]  Ostomy Progress Note      NAME:  Noe Moctezuma  MEDICAL RECORD NUMBER:  [id number]  AGE: 48 y.o.   GENDER:  male  :  1975  TODAY'S DATE:  2024    Subjective       Chief Complaint   Patient presents with    Wound Check     Ostomy          HISTORY of PRESENT ILLNESS     Noe Moctezuma is a 48 y.o. male established patient  who presents today for ostomy/stoma evaluation.     History of Ostomy Context: Patient presents today for ostomy care education and evaluation s/p exploratory lap, takedown and resection of leaking anastomosis with diverting colostomy 3/8/24  per Dr. Dr. Fierro secondary to adenocarcinoma.  Current ostomy care includes Coloplast, 2 piece flat cut to fit pouch with barrier ring and barrier extenders.   He reports wear time of 4 days.  He reports good output from stoma, empties 1-2 times per day.  Has been completing ostomy care independently at home.  Returned to clinic today due to presence of parastomal hernia with occasional prolapse of stoma 1-2 inches by patient report.  He denies any abdominal pain, nausea, vomiting.  He denies any further needs or concerns.    PAST MEDICAL HISTORY        Diagnosis Date    Allergic rhinitis     spring allergies    Arthritis     hands    Cancer (HCC) 2024    colon cancer    Colitis     Diverticulitis     Hyperlipidemia        PAST SURGICAL HISTORY    Past Surgical History:   Procedure Laterality Date    APPENDECTOMY  2023    COLONOSCOPY      COLONOSCOPY  2020    [id number]    COLONOSCOPY  2024    Dr. Vasquez    CYSTOSCOPY Bilateral 2024    CYSTOSCOPY URETERAL STENT INSERTION, kim placement performed by Jos Ng MD at [facility] OR    HERNIA REPAIR  2007    LAPAROTOMY N/A 3/8/2024    Exploratory Laparotomy, Diverting Colostomy performed by Wale Fierro MD at [facility] OR    SIGMOID COLECTOMY N/A 2024    Robotic Left Sigmoid Colon Resection attempted,

## 2024-06-06 RX ORDER — AMOXICILLIN AND CLAVULANATE POTASSIUM 875; 125 MG/1; MG/1
1 TABLET, FILM COATED ORAL 2 TIMES DAILY
Qty: 20 TABLET | Refills: 0 | OUTPATIENT
Start: 2024-06-06 | End: 2024-06-16

## 2024-06-23 ENCOUNTER — PATIENT MESSAGE (OUTPATIENT)
Dept: FAMILY MEDICINE CLINIC | Age: 49
End: 2024-06-23

## 2024-06-23 DIAGNOSIS — Z13.1 SCREENING FOR DIABETES MELLITUS: ICD-10-CM

## 2024-06-23 DIAGNOSIS — Z11.59 ENCOUNTER FOR HEPATITIS C SCREENING TEST FOR LOW RISK PATIENT: ICD-10-CM

## 2024-06-23 DIAGNOSIS — E78.2 MIXED HYPERLIPIDEMIA: Primary | ICD-10-CM

## 2024-06-24 NOTE — TELEPHONE ENCOUNTER
From: Noe Moctezuma  To: Dr. Iliana Mejia  Sent: 6/23/2024 8:00 PM EDT  Subject: Appointment Friday     Any blood work needed for health check up? Blood pressure meds, Cholesterol check, etc?

## 2024-06-28 ENCOUNTER — HOSPITAL ENCOUNTER (OUTPATIENT)
Age: 49
Discharge: HOME | End: 2024-06-28
Payer: COMMERCIAL

## 2024-06-28 ENCOUNTER — OFFICE VISIT (OUTPATIENT)
Dept: FAMILY MEDICINE CLINIC | Age: 49
End: 2024-06-28
Payer: COMMERCIAL

## 2024-06-28 VITALS
HEIGHT: 72 IN | DIASTOLIC BLOOD PRESSURE: 84 MMHG | SYSTOLIC BLOOD PRESSURE: 126 MMHG | OXYGEN SATURATION: 97 % | BODY MASS INDEX: 30.16 KG/M2 | HEART RATE: 69 BPM | WEIGHT: 222.7 LBS

## 2024-06-28 DIAGNOSIS — Z13.1 SCREENING FOR DIABETES MELLITUS: ICD-10-CM

## 2024-06-28 DIAGNOSIS — E78.2 MIXED HYPERLIPIDEMIA: ICD-10-CM

## 2024-06-28 DIAGNOSIS — Z11.59 ENCOUNTER FOR HEPATITIS C SCREENING TEST FOR LOW RISK PATIENT: ICD-10-CM

## 2024-06-28 DIAGNOSIS — I10 PRIMARY HYPERTENSION: Primary | ICD-10-CM

## 2024-06-28 DIAGNOSIS — F32.5 MAJOR DEPRESSIVE DISORDER WITH SINGLE EPISODE, IN FULL REMISSION (HCC): ICD-10-CM

## 2024-06-28 LAB
ANION GAP SERPL CALCULATED.3IONS-SCNC: 9 MMOL/L (ref 9–17)
BUN SERPL-MCNC: 12 MG/DL (ref 6–20)
BUN/CREAT SERPL: 11 (ref 9–20)
CALCIUM SERPL-MCNC: 9.6 MG/DL (ref 8.6–10.4)
CHLORIDE SERPL-SCNC: 102 MMOL/L (ref 98–107)
CHOLEST SERPL-MCNC: 217 MG/DL (ref 0–199)
CHOLESTEROL/HDL RATIO: 7
CO2 SERPL-SCNC: 31 MMOL/L (ref 20–31)
CREAT SERPL-MCNC: 1.1 MG/DL (ref 0.7–1.2)
EST. AVERAGE GLUCOSE BLD GHB EST-MCNC: 100 MG/DL
GFR, ESTIMATED: 82 ML/MIN/1.73M2
GLUCOSE SERPL-MCNC: 91 MG/DL (ref 70–99)
HBA1C MFR BLD: 5.1 % (ref 4–6)
HCV AB SERPL QL IA: NONREACTIVE
HDLC SERPL-MCNC: 29 MG/DL
LDLC SERPL CALC-MCNC: ABNORMAL MG/DL (ref 0–100)
POTASSIUM SERPL-SCNC: 4.1 MMOL/L (ref 3.7–5.3)
SODIUM SERPL-SCNC: 142 MMOL/L (ref 135–144)
TRIGL SERPL-MCNC: 417 MG/DL
VLDLC SERPL CALC-MCNC: ABNORMAL MG/DL

## 2024-06-28 PROCEDURE — 80048 BASIC METABOLIC PNL TOTAL CA: CPT

## 2024-06-28 PROCEDURE — 99214 OFFICE O/P EST MOD 30 MIN: CPT | Performed by: FAMILY MEDICINE

## 2024-06-28 PROCEDURE — 83721 ASSAY OF BLOOD LIPOPROTEIN: CPT

## 2024-06-28 PROCEDURE — 83036 HEMOGLOBIN GLYCOSYLATED A1C: CPT

## 2024-06-28 PROCEDURE — 86803 HEPATITIS C AB TEST: CPT

## 2024-06-28 PROCEDURE — 80061 LIPID PANEL: CPT

## 2024-06-28 PROCEDURE — 36415 COLL VENOUS BLD VENIPUNCTURE: CPT

## 2024-06-28 PROCEDURE — 3079F DIAST BP 80-89 MM HG: CPT | Performed by: FAMILY MEDICINE

## 2024-06-28 PROCEDURE — 3074F SYST BP LT 130 MM HG: CPT | Performed by: FAMILY MEDICINE

## 2024-06-28 ASSESSMENT — ENCOUNTER SYMPTOMS
SHORTNESS OF BREATH: 0
COUGH: 0
CHEST TIGHTNESS: 0
WHEEZING: 0

## 2024-06-28 NOTE — PROGRESS NOTES
/84 (Site: Right Upper Arm, Position: Sitting, Cuff Size: Medium Adult)   Pulse 69   Ht 1.829 m (6')   Wt 101 kg (222 lb 11.2 oz)   SpO2 97%   BMI 30.20 kg/m²     Assessment:       Diagnosis Orders   1. Primary hypertension        2. Major depressive disorder with single episode, in full remission (HCC)                  Plan:    Assessment & Plan     HTN: stable; he has been doing well on the lisnopril so I will continue his current dose of medication.    Depression: resolved; he is feeling much better since I last saw him. He is active in his Gnosticism again and he is able to do things outside and vacation. He never took the celexa and he is doing great without it so I will continue to monitor.       Return in about 1 year (around 6/28/2025) for Well man.      Patientgiven educational materials - see patient instructions.  Discussed use, benefit,and side effects of prescribed medications.  All patient questions answered. Ptvoiced understanding. Reviewed health maintenance.  Instructed to continue currentmedications, diet and exercise.  Patient agreed with treatment plan. Follow up asdirected.     Electronically signed by Iliana Mejia MD on 6/28/2024 at 5:14 PM

## 2024-06-29 LAB — LDLC SERPL DIRECT ASSAY-MCNC: 127 MG/DL

## 2024-07-11 ENCOUNTER — TELEPHONE (OUTPATIENT)
Dept: SURGERY | Age: 49
End: 2024-07-11

## 2024-07-11 ENCOUNTER — OFFICE VISIT (OUTPATIENT)
Dept: SURGERY | Age: 49
End: 2024-07-11
Payer: COMMERCIAL

## 2024-07-11 VITALS
DIASTOLIC BLOOD PRESSURE: 82 MMHG | WEIGHT: 223.7 LBS | HEIGHT: 72 IN | OXYGEN SATURATION: 98 % | RESPIRATION RATE: 18 BRPM | HEART RATE: 89 BPM | SYSTOLIC BLOOD PRESSURE: 122 MMHG | BODY MASS INDEX: 30.3 KG/M2 | TEMPERATURE: 97.4 F

## 2024-07-11 DIAGNOSIS — C18.9 MALIGNANT NEOPLASM OF COLON, UNSPECIFIED PART OF COLON (HCC): Primary | ICD-10-CM

## 2024-07-11 DIAGNOSIS — K43.2 INCISIONAL HERNIA, WITHOUT OBSTRUCTION OR GANGRENE: ICD-10-CM

## 2024-07-11 DIAGNOSIS — Z01.818 PRE-OP TESTING: ICD-10-CM

## 2024-07-11 DIAGNOSIS — Z93.3 COLOSTOMY IN PLACE (HCC): ICD-10-CM

## 2024-07-11 PROCEDURE — 99213 OFFICE O/P EST LOW 20 MIN: CPT | Performed by: SURGERY

## 2024-07-11 PROCEDURE — 3074F SYST BP LT 130 MM HG: CPT | Performed by: SURGERY

## 2024-07-11 PROCEDURE — 3079F DIAST BP 80-89 MM HG: CPT | Performed by: SURGERY

## 2024-07-11 RX ORDER — METRONIDAZOLE 500 MG/1
500 TABLET ORAL 3 TIMES DAILY
Qty: 3 TABLET | Refills: 0 | Status: SHIPPED | OUTPATIENT
Start: 2024-07-11 | End: 2024-07-12

## 2024-07-11 NOTE — TELEPHONE ENCOUNTER
Patient scheduled for surgery with Dr. Covington on 07- at 7:30am with an arrival of 5:45am.  Preop orders (H/H) and surgery instructions given.  Bowel prep given and reviewed.  Patient to do an enema the night prior and morning of surgery via the rectum.  Surgery consent signed.  Patient has antibacterial soap at home to use.  Flagyl to be sent to take.

## 2024-07-11 NOTE — TELEPHONE ENCOUNTER
Surgery Scheduling Form   [facility] [address] [place], [place] [address]    Phone * [phone]   Surgical Scheduling Direct line Phone * [phone]  Fax * [phone]      Noe Moctezuma      1975    male    [address] [place] [address]   Marital Status:         Home Phone: [phone]   Cell Phone:   Telephone Information:   Mobile [phone]              Surgeon: Dr. Covington  Surgery Date:07-   Time: 7:30am     Procedure: Takedown colostomy with incisional hernia repair with Phasix mesh    Inpatient     Diagnosis: Colostomy in place, Incisional hernia, Colon cancer    Important Medical History: In [facility]    Special Inst/Equip:     CPT Codes: 12008, 66504    Latex Allergy:   no Cardiac Device:  no    Anesthesia Type: General    Case Location:  Main OR     Preadmission Testing: Phone Call      PAT Date and Time: ________________________________    PAT Confirmation #: _________________________________    Post Op Visit:  ______________________________________    Need Preop Cardiac Clearance:   no    Does patient have Cardiologist/physician? No      Surgery Conformation #:  ______________________________________________    : __________________________________ Date:____________________        Insurance Company Name:  [employer]

## 2024-07-16 ENCOUNTER — HOSPITAL ENCOUNTER (OUTPATIENT)
Age: 49
Discharge: HOME | End: 2024-07-16
Payer: COMMERCIAL

## 2024-07-16 ENCOUNTER — TELEPHONE (OUTPATIENT)
Dept: SURGERY | Age: 49
End: 2024-07-16

## 2024-07-16 DIAGNOSIS — C18.9 MALIGNANT NEOPLASM OF COLON, UNSPECIFIED PART OF COLON (HCC): ICD-10-CM

## 2024-07-16 DIAGNOSIS — Z01.818 PRE-OP TESTING: ICD-10-CM

## 2024-07-16 DIAGNOSIS — Z93.3 COLOSTOMY IN PLACE (HCC): ICD-10-CM

## 2024-07-16 LAB
HCT VFR BLD AUTO: 46.4 % (ref 40.7–50.3)
HGB BLD-MCNC: 16.2 G/DL (ref 13–17)

## 2024-07-16 PROCEDURE — 85018 HEMOGLOBIN: CPT

## 2024-07-16 PROCEDURE — 36415 COLL VENOUS BLD VENIPUNCTURE: CPT

## 2024-07-16 PROCEDURE — 85014 HEMATOCRIT: CPT

## 2024-07-17 ENCOUNTER — PREP FOR PROCEDURE (OUTPATIENT)
Dept: SURGERY | Age: 49
End: 2024-07-17

## 2024-07-22 RX ORDER — SODIUM CHLORIDE 0.9 % (FLUSH) 0.9 %
5-40 SYRINGE (ML) INJECTION PRN
Status: CANCELLED | OUTPATIENT
Start: 2024-07-22

## 2024-07-22 RX ORDER — SODIUM CHLORIDE 0.9 % (FLUSH) 0.9 %
5-40 SYRINGE (ML) INJECTION EVERY 12 HOURS SCHEDULED
Status: CANCELLED | OUTPATIENT
Start: 2024-07-22

## 2024-07-22 RX ORDER — SODIUM CHLORIDE 9 MG/ML
INJECTION, SOLUTION INTRAVENOUS PRN
Status: CANCELLED | OUTPATIENT
Start: 2024-07-22

## 2024-07-22 RX ORDER — SODIUM CHLORIDE 9 MG/ML
INJECTION, SOLUTION INTRAVENOUS CONTINUOUS
Status: CANCELLED | OUTPATIENT
Start: 2024-07-22

## 2024-07-25 NOTE — PROGRESS NOTES
[facility] GENERAL SURGERY  General Surgery  Clinic  Note    Pt Name: Noe Moctezuma  Medical Record Number: [id number]  Date of Birth 1975   Today's Date: 07/11/2024      ASSESSMENT       ICD-10-CM    1. Malignant neoplasm of colon, unspecified part of colon (HCC)  C18.9 Hemoglobin and Hematocrit     NH CLOSURE ENTEROSTOMY LG/SMALL INTESTINE      2. Colostomy in place (HCC)  Z93.3 Hemoglobin and Hematocrit     NH CLOSURE ENTEROSTOMY LG/SMALL INTESTINE      3. Pre-op testing  Z01.818 Hemoglobin and Hematocrit      4. Incisional hernia, without obstruction or gangrene  K43.2 NH RPR AA HERNIA 1ST 3-10 CM REDUCIBLE           PLAN   I had a long discussion he is in the window to start drug about reversal.  With all the hernias that he is having and prolapse, it may be a better time to discuss reversal than waiting couple additional months.  He is overall gaining weight back and feeling better.  He expressed understanding would like to proceed with surgery sooner than later.  We discussed surgery which would be an open colostomy reversal, will repair the hernias primarily as well as with reinforcement with a phasic's mesh.  This would require drains.  He understands the risks including but not limited to bleeding infection damage from structures anastomotic leak need for more procedures.  He expressed understanding would like to proceed  Patient was given preoperative antibiotics and a bowel prep.  Patient is to be n.p.o. with exception of clears night before surgery.  SUBJECTIVE   Noe is doing overall okay, he denies a hernia belt that is helping.  He is prone to prolapse of his stoma which drives him crazy but overall does not cause a lot of problems he has however noticed he has multiple midline hernias..       CURRENT MEDICATIONS     Current Outpatient Medications on File Prior to Visit   Medication Sig Dispense Refill    lisinopril (PRINIVIL;ZESTRIL) 10 MG tablet TAKE 1 TABLET BY MOUTH

## 2024-07-29 ENCOUNTER — ANESTHESIA (OUTPATIENT)
Dept: OPERATING ROOM | Age: 49
End: 2024-07-29
Payer: COMMERCIAL

## 2024-07-29 ENCOUNTER — APPOINTMENT (OUTPATIENT)
Dept: GENERAL RADIOLOGY | Age: 49
DRG: 330 | End: 2024-07-29
Attending: SURGERY
Payer: COMMERCIAL

## 2024-07-29 ENCOUNTER — HOSPITAL ENCOUNTER (INPATIENT)
Age: 49
LOS: 7 days | Discharge: HOME | DRG: 330 | End: 2024-08-05
Attending: SURGERY | Admitting: SURGERY
Payer: COMMERCIAL

## 2024-07-29 ENCOUNTER — ANESTHESIA EVENT (OUTPATIENT)
Dept: OPERATING ROOM | Age: 49
End: 2024-07-29
Payer: COMMERCIAL

## 2024-07-29 DIAGNOSIS — C18.9 MALIGNANT NEOPLASM OF COLON, UNSPECIFIED PART OF COLON (HCC): ICD-10-CM

## 2024-07-29 DIAGNOSIS — K43.0 INCISIONAL HERNIA WITH OBSTRUCTION BUT NO GANGRENE: ICD-10-CM

## 2024-07-29 DIAGNOSIS — Z98.890 S/P COLOSTOMY TAKEDOWN: Primary | ICD-10-CM

## 2024-07-29 DIAGNOSIS — Z93.3 COLOSTOMY IN PLACE (HCC): ICD-10-CM

## 2024-07-29 PROCEDURE — 2709999900 HC NON-CHARGEABLE SUPPLY: Performed by: SURGERY

## 2024-07-29 PROCEDURE — 44626 REPAIR BOWEL OPENING: CPT | Performed by: SURGERY

## 2024-07-29 PROCEDURE — 2720000010 HC SURG SUPPLY STERILE: Performed by: SURGERY

## 2024-07-29 PROCEDURE — 6360000002 HC RX W HCPCS

## 2024-07-29 PROCEDURE — 6360000002 HC RX W HCPCS: Performed by: SURGERY

## 2024-07-29 PROCEDURE — 0DBE0ZZ EXCISION OF LARGE INTESTINE, OPEN APPROACH: ICD-10-PCS | Performed by: SURGERY

## 2024-07-29 PROCEDURE — 2580000003 HC RX 258: Performed by: SURGERY

## 2024-07-29 PROCEDURE — 0WUF0JZ SUPPLEMENT ABDOMINAL WALL WITH SYNTHETIC SUBSTITUTE, OPEN APPROACH: ICD-10-PCS | Performed by: SURGERY

## 2024-07-29 PROCEDURE — 6360000002 HC RX W HCPCS: Performed by: NURSE ANESTHETIST, CERTIFIED REGISTERED

## 2024-07-29 PROCEDURE — C1781 MESH (IMPLANTABLE): HCPCS | Performed by: SURGERY

## 2024-07-29 PROCEDURE — 2500000003 HC RX 250 WO HCPCS: Performed by: NURSE ANESTHETIST, CERTIFIED REGISTERED

## 2024-07-29 PROCEDURE — 3600000004 HC SURGERY LEVEL 4 BASE: Performed by: SURGERY

## 2024-07-29 PROCEDURE — 3700000000 HC ANESTHESIA ATTENDED CARE: Performed by: SURGERY

## 2024-07-29 PROCEDURE — P9045 ALBUMIN (HUMAN), 5%, 250 ML: HCPCS | Performed by: NURSE ANESTHETIST, CERTIFIED REGISTERED

## 2024-07-29 PROCEDURE — 6360000002 HC RX W HCPCS: Performed by: STUDENT IN AN ORGANIZED HEALTH CARE EDUCATION/TRAINING PROGRAM

## 2024-07-29 PROCEDURE — 6370000000 HC RX 637 (ALT 250 FOR IP): Performed by: PHYSICIAN ASSISTANT

## 2024-07-29 PROCEDURE — 2500000003 HC RX 250 WO HCPCS: Performed by: SURGERY

## 2024-07-29 PROCEDURE — 74018 RADEX ABDOMEN 1 VIEW: CPT

## 2024-07-29 PROCEDURE — 7100000001 HC PACU RECOVERY - ADDTL 15 MIN: Performed by: SURGERY

## 2024-07-29 PROCEDURE — 6370000000 HC RX 637 (ALT 250 FOR IP): Performed by: SURGERY

## 2024-07-29 PROCEDURE — 3600000014 HC SURGERY LEVEL 4 ADDTL 15MIN: Performed by: SURGERY

## 2024-07-29 PROCEDURE — 3700000001 HC ADD 15 MINUTES (ANESTHESIA): Performed by: SURGERY

## 2024-07-29 PROCEDURE — 1200000000 HC SEMI PRIVATE

## 2024-07-29 PROCEDURE — 88307 TISSUE EXAM BY PATHOLOGIST: CPT

## 2024-07-29 PROCEDURE — 7100000000 HC PACU RECOVERY - FIRST 15 MIN: Performed by: SURGERY

## 2024-07-29 DEVICE — ALLOGRAFT HUM TISS 6X3 CM CRYOPRESERVED AMNIOX CLARIX CRD 1K: Type: IMPLANTABLE DEVICE | Site: ABDOMEN | Status: FUNCTIONAL

## 2024-07-29 DEVICE — PHASIX MESH, 10" X 12" (25.4 CM X 30.5 CM), RECTANGLE
Type: IMPLANTABLE DEVICE | Site: ABDOMEN | Status: FUNCTIONAL
Brand: PHASIX

## 2024-07-29 RX ORDER — SODIUM CHLORIDE 9 MG/ML
INJECTION, SOLUTION INTRAVENOUS PRN
Status: DISCONTINUED | OUTPATIENT
Start: 2024-07-29 | End: 2024-07-29 | Stop reason: HOSPADM

## 2024-07-29 RX ORDER — FENTANYL CITRATE 50 UG/ML
INJECTION, SOLUTION INTRAMUSCULAR; INTRAVENOUS PRN
Status: DISCONTINUED | OUTPATIENT
Start: 2024-07-29 | End: 2024-07-29 | Stop reason: SDUPTHER

## 2024-07-29 RX ORDER — FENTANYL CITRATE 50 UG/ML
INJECTION, SOLUTION INTRAMUSCULAR; INTRAVENOUS
Status: COMPLETED
Start: 2024-07-29 | End: 2024-07-29

## 2024-07-29 RX ORDER — MEPERIDINE HYDROCHLORIDE 25 MG/ML
12.5 INJECTION INTRAMUSCULAR; INTRAVENOUS; SUBCUTANEOUS EVERY 5 MIN PRN
Status: DISCONTINUED | OUTPATIENT
Start: 2024-07-29 | End: 2024-07-29 | Stop reason: HOSPADM

## 2024-07-29 RX ORDER — DEXAMETHASONE SODIUM PHOSPHATE 10 MG/ML
INJECTION, EMULSION INTRAMUSCULAR; INTRAVENOUS PRN
Status: DISCONTINUED | OUTPATIENT
Start: 2024-07-29 | End: 2024-07-29 | Stop reason: SDUPTHER

## 2024-07-29 RX ORDER — SODIUM CHLORIDE 0.9 % (FLUSH) 0.9 %
5-40 SYRINGE (ML) INJECTION PRN
Status: DISCONTINUED | OUTPATIENT
Start: 2024-07-29 | End: 2024-08-05 | Stop reason: HOSPADM

## 2024-07-29 RX ORDER — METRONIDAZOLE 500 MG/100ML
500 INJECTION, SOLUTION INTRAVENOUS
Status: COMPLETED | OUTPATIENT
Start: 2024-07-29 | End: 2024-07-29

## 2024-07-29 RX ORDER — ALBUMIN, HUMAN INJ 5% 5 %
SOLUTION INTRAVENOUS PRN
Status: DISCONTINUED | OUTPATIENT
Start: 2024-07-29 | End: 2024-07-29 | Stop reason: SDUPTHER

## 2024-07-29 RX ORDER — PROPOFOL 10 MG/ML
INJECTION, EMULSION INTRAVENOUS PRN
Status: DISCONTINUED | OUTPATIENT
Start: 2024-07-29 | End: 2024-07-29 | Stop reason: SDUPTHER

## 2024-07-29 RX ORDER — ONDANSETRON 4 MG/1
4 TABLET, ORALLY DISINTEGRATING ORAL EVERY 8 HOURS PRN
Status: DISCONTINUED | OUTPATIENT
Start: 2024-07-29 | End: 2024-08-05 | Stop reason: HOSPADM

## 2024-07-29 RX ORDER — NALOXONE HYDROCHLORIDE 0.4 MG/ML
INJECTION, SOLUTION INTRAMUSCULAR; INTRAVENOUS; SUBCUTANEOUS PRN
Status: DISCONTINUED | OUTPATIENT
Start: 2024-07-29 | End: 2024-07-29 | Stop reason: HOSPADM

## 2024-07-29 RX ORDER — ATROPINE SULFATE 1 MG/ML
INJECTION, SOLUTION INTRAVENOUS PRN
Status: DISCONTINUED | OUTPATIENT
Start: 2024-07-29 | End: 2024-07-29 | Stop reason: SDUPTHER

## 2024-07-29 RX ORDER — FENTANYL CITRATE 50 UG/ML
50 INJECTION, SOLUTION INTRAMUSCULAR; INTRAVENOUS EVERY 5 MIN PRN
Status: COMPLETED | OUTPATIENT
Start: 2024-07-29 | End: 2024-07-29

## 2024-07-29 RX ORDER — LIDOCAINE HCL/PF 100 MG/5ML
SYRINGE (ML) INJECTION PRN
Status: DISCONTINUED | OUTPATIENT
Start: 2024-07-29 | End: 2024-07-29 | Stop reason: SDUPTHER

## 2024-07-29 RX ORDER — SODIUM CHLORIDE 9 MG/ML
INJECTION, SOLUTION INTRAVENOUS PRN
Status: DISCONTINUED | OUTPATIENT
Start: 2024-07-29 | End: 2024-08-05 | Stop reason: HOSPADM

## 2024-07-29 RX ORDER — PHENYLEPHRINE HCL IN 0.9% NACL 1 MG/10 ML
SYRINGE (ML) INTRAVENOUS PRN
Status: DISCONTINUED | OUTPATIENT
Start: 2024-07-29 | End: 2024-07-29 | Stop reason: SDUPTHER

## 2024-07-29 RX ORDER — ROCURONIUM BROMIDE 10 MG/ML
INJECTION, SOLUTION INTRAVENOUS PRN
Status: DISCONTINUED | OUTPATIENT
Start: 2024-07-29 | End: 2024-07-29 | Stop reason: SDUPTHER

## 2024-07-29 RX ORDER — ENOXAPARIN SODIUM 100 MG/ML
40 INJECTION SUBCUTANEOUS DAILY
Status: DISCONTINUED | OUTPATIENT
Start: 2024-07-30 | End: 2024-08-05 | Stop reason: HOSPADM

## 2024-07-29 RX ORDER — ONDANSETRON 2 MG/ML
4 INJECTION INTRAMUSCULAR; INTRAVENOUS EVERY 6 HOURS PRN
Status: DISCONTINUED | OUTPATIENT
Start: 2024-07-29 | End: 2024-08-05 | Stop reason: HOSPADM

## 2024-07-29 RX ORDER — DIPHENHYDRAMINE HYDROCHLORIDE 50 MG/ML
12.5 INJECTION INTRAMUSCULAR; INTRAVENOUS
Status: DISCONTINUED | OUTPATIENT
Start: 2024-07-29 | End: 2024-07-29 | Stop reason: HOSPADM

## 2024-07-29 RX ORDER — MIDAZOLAM HYDROCHLORIDE 1 MG/ML
INJECTION INTRAMUSCULAR; INTRAVENOUS PRN
Status: DISCONTINUED | OUTPATIENT
Start: 2024-07-29 | End: 2024-07-29 | Stop reason: SDUPTHER

## 2024-07-29 RX ORDER — BUPIVACAINE HYDROCHLORIDE 5 MG/ML
INJECTION, SOLUTION EPIDURAL; INTRACAUDAL PRN
Status: DISCONTINUED | OUTPATIENT
Start: 2024-07-29 | End: 2024-07-29 | Stop reason: ALTCHOICE

## 2024-07-29 RX ORDER — SODIUM CHLORIDE 0.9 % (FLUSH) 0.9 %
5-40 SYRINGE (ML) INJECTION EVERY 12 HOURS SCHEDULED
Status: DISCONTINUED | OUTPATIENT
Start: 2024-07-29 | End: 2024-08-05 | Stop reason: HOSPADM

## 2024-07-29 RX ORDER — POTASSIUM CHLORIDE 7.45 MG/ML
10 INJECTION INTRAVENOUS PRN
Status: DISCONTINUED | OUTPATIENT
Start: 2024-07-29 | End: 2024-08-05 | Stop reason: HOSPADM

## 2024-07-29 RX ORDER — DEXTROSE MONOHYDRATE, SODIUM CHLORIDE, AND POTASSIUM CHLORIDE 50; 1.49; 4.5 G/1000ML; G/1000ML; G/1000ML
INJECTION, SOLUTION INTRAVENOUS CONTINUOUS
Status: DISPENSED | OUTPATIENT
Start: 2024-07-29 | End: 2024-07-30

## 2024-07-29 RX ORDER — POTASSIUM CHLORIDE 20 MEQ/1
40 TABLET, EXTENDED RELEASE ORAL PRN
Status: DISCONTINUED | OUTPATIENT
Start: 2024-07-29 | End: 2024-08-05 | Stop reason: HOSPADM

## 2024-07-29 RX ORDER — CYCLOBENZAPRINE HCL 10 MG
10 TABLET ORAL 3 TIMES DAILY PRN
Status: DISCONTINUED | OUTPATIENT
Start: 2024-07-29 | End: 2024-08-05 | Stop reason: HOSPADM

## 2024-07-29 RX ORDER — SODIUM CHLORIDE 0.9 % (FLUSH) 0.9 %
5-40 SYRINGE (ML) INJECTION EVERY 12 HOURS SCHEDULED
Status: DISCONTINUED | OUTPATIENT
Start: 2024-07-29 | End: 2024-07-29 | Stop reason: HOSPADM

## 2024-07-29 RX ORDER — SODIUM CHLORIDE 0.9 % (FLUSH) 0.9 %
5-40 SYRINGE (ML) INJECTION PRN
Status: DISCONTINUED | OUTPATIENT
Start: 2024-07-29 | End: 2024-07-29 | Stop reason: HOSPADM

## 2024-07-29 RX ORDER — ONDANSETRON 2 MG/ML
INJECTION INTRAMUSCULAR; INTRAVENOUS PRN
Status: DISCONTINUED | OUTPATIENT
Start: 2024-07-29 | End: 2024-07-29 | Stop reason: SDUPTHER

## 2024-07-29 RX ORDER — GLYCOPYRROLATE 0.2 MG/ML
INJECTION INTRAMUSCULAR; INTRAVENOUS PRN
Status: DISCONTINUED | OUTPATIENT
Start: 2024-07-29 | End: 2024-07-29 | Stop reason: SDUPTHER

## 2024-07-29 RX ORDER — SODIUM CHLORIDE 9 MG/ML
INJECTION, SOLUTION INTRAVENOUS CONTINUOUS
Status: DISCONTINUED | OUTPATIENT
Start: 2024-07-29 | End: 2024-07-29 | Stop reason: HOSPADM

## 2024-07-29 RX ORDER — ONDANSETRON 2 MG/ML
4 INJECTION INTRAMUSCULAR; INTRAVENOUS
Status: DISCONTINUED | OUTPATIENT
Start: 2024-07-29 | End: 2024-07-29 | Stop reason: HOSPADM

## 2024-07-29 RX ADMIN — Medication 200 MCG: at 08:34

## 2024-07-29 RX ADMIN — FENTANYL CITRATE 50 MCG: 50 INJECTION INTRAMUSCULAR; INTRAVENOUS at 11:23

## 2024-07-29 RX ADMIN — FENTANYL CITRATE 50 MCG: 50 INJECTION INTRAMUSCULAR; INTRAVENOUS at 11:03

## 2024-07-29 RX ADMIN — HYDROMORPHONE HYDROCHLORIDE 0.5 MG: 1 INJECTION, SOLUTION INTRAMUSCULAR; INTRAVENOUS; SUBCUTANEOUS at 11:28

## 2024-07-29 RX ADMIN — HYDROMORPHONE HYDROCHLORIDE 0.5 MG: 1 INJECTION, SOLUTION INTRAMUSCULAR; INTRAVENOUS; SUBCUTANEOUS at 11:08

## 2024-07-29 RX ADMIN — GLYCOPYRROLATE 0.2 MG: 0.2 INJECTION INTRAMUSCULAR; INTRAVENOUS at 08:21

## 2024-07-29 RX ADMIN — ONDANSETRON 4 MG: 2 INJECTION INTRAMUSCULAR; INTRAVENOUS at 10:15

## 2024-07-29 RX ADMIN — FENTANYL CITRATE 50 MCG: 50 INJECTION INTRAMUSCULAR; INTRAVENOUS at 11:33

## 2024-07-29 RX ADMIN — MIDAZOLAM 2 MG: 1 INJECTION INTRAMUSCULAR; INTRAVENOUS at 07:38

## 2024-07-29 RX ADMIN — FENTANYL CITRATE 50 MCG: 50 INJECTION INTRAMUSCULAR; INTRAVENOUS at 10:34

## 2024-07-29 RX ADMIN — WATER 2000 MG: 1 INJECTION INTRAMUSCULAR; INTRAVENOUS; SUBCUTANEOUS at 08:01

## 2024-07-29 RX ADMIN — FENTANYL CITRATE 50 MCG: 50 INJECTION INTRAMUSCULAR; INTRAVENOUS at 11:13

## 2024-07-29 RX ADMIN — FENTANYL CITRATE 50 MCG: 50 INJECTION INTRAMUSCULAR; INTRAVENOUS at 09:25

## 2024-07-29 RX ADMIN — DEXAMETHASONE SODIUM PHOSPHATE 10 MG: 10 INJECTION, EMULSION INTRAMUSCULAR; INTRAVENOUS at 08:10

## 2024-07-29 RX ADMIN — PROPOFOL 200 MG: 10 INJECTION, EMULSION INTRAVENOUS at 07:42

## 2024-07-29 RX ADMIN — PIPERACILLIN AND TAZOBACTAM 3375 MG: 3; .375 INJECTION, POWDER, FOR SOLUTION INTRAVENOUS at 21:27

## 2024-07-29 RX ADMIN — SODIUM CHLORIDE: 9 INJECTION, SOLUTION INTRAVENOUS at 06:34

## 2024-07-29 RX ADMIN — Medication 200 MCG: at 09:49

## 2024-07-29 RX ADMIN — FENTANYL CITRATE 50 MCG: 50 INJECTION INTRAMUSCULAR; INTRAVENOUS at 10:51

## 2024-07-29 RX ADMIN — HYDROMORPHONE HYDROCHLORIDE 0.5 MG: 1 INJECTION, SOLUTION INTRAMUSCULAR; INTRAVENOUS; SUBCUTANEOUS at 21:26

## 2024-07-29 RX ADMIN — SODIUM CHLORIDE: 9 INJECTION, SOLUTION INTRAVENOUS at 08:28

## 2024-07-29 RX ADMIN — Medication 80 MG: at 07:41

## 2024-07-29 RX ADMIN — SUGAMMADEX 400 MG: 100 INJECTION, SOLUTION INTRAVENOUS at 10:39

## 2024-07-29 RX ADMIN — ONDANSETRON 4 MG: 2 INJECTION INTRAMUSCULAR; INTRAVENOUS at 15:44

## 2024-07-29 RX ADMIN — ROCURONIUM BROMIDE 50 MG: 10 INJECTION INTRAVENOUS at 07:42

## 2024-07-29 RX ADMIN — PIPERACILLIN AND TAZOBACTAM 3375 MG: 3; .375 INJECTION, POWDER, FOR SOLUTION INTRAVENOUS at 13:07

## 2024-07-29 RX ADMIN — HYDROMORPHONE HYDROCHLORIDE 0.5 MG: 1 INJECTION, SOLUTION INTRAMUSCULAR; INTRAVENOUS; SUBCUTANEOUS at 12:36

## 2024-07-29 RX ADMIN — HYDROMORPHONE HYDROCHLORIDE 0.5 MG: 1 INJECTION, SOLUTION INTRAMUSCULAR; INTRAVENOUS; SUBCUTANEOUS at 11:38

## 2024-07-29 RX ADMIN — ROCURONIUM BROMIDE 30 MG: 10 INJECTION INTRAVENOUS at 08:43

## 2024-07-29 RX ADMIN — NALOXEGOL OXALATE 25 MG: 25 TABLET, FILM COATED ORAL at 20:01

## 2024-07-29 RX ADMIN — ROCURONIUM BROMIDE 20 MG: 10 INJECTION INTRAVENOUS at 09:25

## 2024-07-29 RX ADMIN — ALBUMIN (HUMAN) 12.5 G: 12.5 INJECTION, SOLUTION INTRAVENOUS at 10:01

## 2024-07-29 RX ADMIN — Medication 100 MCG: at 09:25

## 2024-07-29 RX ADMIN — FENTANYL CITRATE 100 MCG: 50 INJECTION INTRAMUSCULAR; INTRAVENOUS at 07:39

## 2024-07-29 RX ADMIN — ATROPINE SULFATE 1 MG: 1 INJECTION, SOLUTION INTRAVENOUS at 08:29

## 2024-07-29 RX ADMIN — ALBUMIN (HUMAN) 12.5 G: 12.5 INJECTION, SOLUTION INTRAVENOUS at 09:46

## 2024-07-29 RX ADMIN — CYCLOBENZAPRINE 10 MG: 10 TABLET, FILM COATED ORAL at 20:01

## 2024-07-29 RX ADMIN — POTASSIUM CHLORIDE, DEXTROSE MONOHYDRATE AND SODIUM CHLORIDE: 150; 5; 450 INJECTION, SOLUTION INTRAVENOUS at 15:47

## 2024-07-29 RX ADMIN — HYDROMORPHONE HYDROCHLORIDE 0.5 MG: 1 INJECTION, SOLUTION INTRAMUSCULAR; INTRAVENOUS; SUBCUTANEOUS at 11:18

## 2024-07-29 RX ADMIN — HYDROMORPHONE HYDROCHLORIDE 0.5 MG: 1 INJECTION, SOLUTION INTRAMUSCULAR; INTRAVENOUS; SUBCUTANEOUS at 15:33

## 2024-07-29 RX ADMIN — METRONIDAZOLE 500 MG: 500 SOLUTION INTRAVENOUS at 07:55

## 2024-07-29 RX ADMIN — HYDROMORPHONE HYDROCHLORIDE 0.5 MG: 1 INJECTION, SOLUTION INTRAMUSCULAR; INTRAVENOUS; SUBCUTANEOUS at 18:26

## 2024-07-29 RX ADMIN — ROCURONIUM BROMIDE 20 MG: 10 INJECTION INTRAVENOUS at 09:51

## 2024-07-29 RX ADMIN — Medication 200 MCG: at 09:35

## 2024-07-29 RX ADMIN — Medication 100 MCG: at 08:32

## 2024-07-29 RX ADMIN — Medication 200 MCG: at 10:01

## 2024-07-29 RX ADMIN — SODIUM CHLORIDE: 9 INJECTION, SOLUTION INTRAVENOUS at 09:17

## 2024-07-29 ASSESSMENT — PAIN DESCRIPTION - ONSET
ONSET: ON-GOING

## 2024-07-29 ASSESSMENT — PAIN SCALES - GENERAL
PAINLEVEL_OUTOF10: 9
PAINLEVEL_OUTOF10: 8
PAINLEVEL_OUTOF10: 7
PAINLEVEL_OUTOF10: 9
PAINLEVEL_OUTOF10: 8
PAINLEVEL_OUTOF10: 8
PAINLEVEL_OUTOF10: 6
PAINLEVEL_OUTOF10: 7
PAINLEVEL_OUTOF10: 9
PAINLEVEL_OUTOF10: 0
PAINLEVEL_OUTOF10: 5
PAINLEVEL_OUTOF10: 8
PAINLEVEL_OUTOF10: 7
PAINLEVEL_OUTOF10: 9
PAINLEVEL_OUTOF10: 8

## 2024-07-29 ASSESSMENT — PAIN DESCRIPTION - ORIENTATION
ORIENTATION: MID
ORIENTATION: MID;UPPER

## 2024-07-29 ASSESSMENT — PAIN DESCRIPTION - DESCRIPTORS
DESCRIPTORS: ACHING;SPASM
DESCRIPTORS: SORE;SHARP
DESCRIPTORS: SHARP;BURNING;STABBING
DESCRIPTORS: SPASM

## 2024-07-29 ASSESSMENT — PAIN DESCRIPTION - PAIN TYPE
TYPE: SURGICAL PAIN

## 2024-07-29 ASSESSMENT — PAIN DESCRIPTION - FREQUENCY
FREQUENCY: CONTINUOUS

## 2024-07-29 ASSESSMENT — PAIN - FUNCTIONAL ASSESSMENT
PAIN_FUNCTIONAL_ASSESSMENT: PREVENTS OR INTERFERES SOME ACTIVE ACTIVITIES AND ADLS

## 2024-07-29 ASSESSMENT — PAIN DESCRIPTION - LOCATION
LOCATION: ABDOMEN

## 2024-07-29 NOTE — ANESTHESIA PRE PROCEDURE
Department of Anesthesiology  Preprocedure Note       Name:  Noe Moctezuma   Age:  48 y.o.  :  1975                                          MRN:  [id number]         Date:  2024      Surgeon: Surgeon(s):  Caroline Covington MD    Procedure: Procedure(s):  Takedown Colostomy with Incisional Hernia Repair with Phasix Mesh    Medications prior to admission:   Prior to Admission medications    Medication Sig Start Date End Date Taking? Authorizing Provider   lisinopril (PRINIVIL;ZESTRIL) 10 MG tablet TAKE 1 TABLET BY MOUTH DAILY 24   Iliana Mejia MD   acetaminophen (TYLENOL) 500 MG tablet Take 1 tablet by mouth every 6 hours as needed for Pain    Provider, MD [place]       Current medications:    Current Facility-Administered Medications   Medication Dose Route Frequency Provider Last Rate Last Admin   • sodium chloride flush 0.9 % injection 5-40 mL  5-40 mL IntraVENous 2 times per day Caroline Covington MD       • sodium chloride flush 0.9 % injection 5-40 mL  5-40 mL IntraVENous PRN Caroline Covington MD       • 0.9 % sodium chloride infusion   IntraVENous PRN Caroline Covington MD       • 0.9 % sodium chloride infusion   IntraVENous Continuous Caroline Covington  mL/hr at 24 0634 New Bag at 24 0634   • ceFAZolin (ANCEF) 2,000 mg in sterile water 20 mL IV syringe  2,000 mg IntraVENous On Call to OR Caroline Covington MD        And   • metroNIDAZOLE (FLAGYL) 500 mg in 0.9% NaCl 100 mL IVPB premix  500 mg IntraVENous On Call to OR Caroline Covington MD           Allergies:  No Known Allergies    Problem List:    Patient Active Problem List   Diagnosis Code   • Allergic rhinitis J30.9   • Diverticulosis of colon K57.30   • Intermediate coronary syndrome (HCC) I20.0   • Ulcerative proctitis with rectal bleeding (HCC) K51.211   • Diverticulitis of large intestine K57.32   • Irritable bowel syndrome K58.9   • Current moderate episode of major depressive disorder without prior episode

## 2024-07-29 NOTE — ANESTHESIA POSTPROCEDURE EVALUATION
Department of Anesthesiology  Postprocedure Note    Patient: Noe Moctezuma  MRN: [id number]  YOB: 1975  Date of evaluation: 7/29/2024    Procedure Summary       Date: 07/29/24 Room / Location: [facility] OR 07 / [facility] OR    Anesthesia Start: 0736 Anesthesia Stop: 1059    Procedure: Takedown Colostomy with Incisional Hernia Repair with Phasix Mesh (Abdomen) Diagnosis:       Colostomy in place (HCC)      Incisional hernia with obstruction but no gangrene      Malignant neoplasm of colon, unspecified part of colon (HCC)      (Colostomy in place (HCC) [Z93.3])      (Incisional hernia with obstruction but no gangrene [K43.0])      (Malignant neoplasm of colon, unspecified part of colon (HCC) [C18.9])    Surgeons: Caroline Covington MD Responsible Provider: Nathan Pitts DO    Anesthesia Type: general ASA Status: 3            Anesthesia Type: No value filed.    Dudley Phase I: Dudley Score: 9    Dudley Phase II:      Anesthesia Post Evaluation    Patient location during evaluation: PACU  Patient participation: complete - patient participated  Level of consciousness: awake and alert  Airway patency: patent  Nausea & Vomiting: no vomiting and no nausea  Cardiovascular status: hemodynamically stable  Respiratory status: acceptable and room air  Hydration status: stable  Pain management: adequate    No notable events documented.

## 2024-07-29 NOTE — H&P
DAILY 90 tablet 1    acetaminophen (TYLENOL) 500 MG tablet Take 1 tablet by mouth every 6 hours as needed for Pain       No current facility-administered medications on file prior to visit.       OBJECTIVE   CURRENT VITALS:  height is 1.829 m (6') and weight is 101.5 kg (223 lb 11.2 oz). His temporal temperature is 97.4 °F (36.3 °C). His blood pressure is 122/82 and his pulse is 89. His respiration is 18 and oxygen saturation is 98%.     Physical Exam  Constitutional:       Appearance: Normal appearance. He is not ill-appearing.   Cardiovascular:      Rate and Rhythm: Normal rate.      Pulses: Normal pulses.   Abdominal:          Comments: Ostomy is pink patent and producing, prolapse is not profound at this time.  Does have multiple midline fascial hernias.  Bowel is soft and reducible in this location   Skin:     General: Skin is warm.   Neurological:      General: No focal deficit present.      Mental Status: He is alert.   Psychiatric:         Mood and Affect: Mood normal.         Behavior: Behavior normal.         Thought Content: Thought content normal.          LABS and Pathology   na    RADIOLOGY   na    Electronically signed by Caroline Covington MD on 7/25/2024 at 9:07 AM

## 2024-07-30 PROBLEM — Z98.890 S/P COLOSTOMY TAKEDOWN: Status: ACTIVE | Noted: 2024-07-30

## 2024-07-30 PROBLEM — Z93.3 COLOSTOMY IN PLACE (HCC): Status: RESOLVED | Noted: 2024-07-29 | Resolved: 2024-07-30

## 2024-07-30 LAB
ANION GAP SERPL CALC-SCNC: 11 MEQ/L (ref 8–16)
BUN SERPL-MCNC: 10 MG/DL (ref 7–22)
CA-I BLD ISE-SCNC: 1.13 MMOL/L (ref 1.12–1.32)
CALCIUM SERPL-MCNC: 8.4 MG/DL (ref 8.5–10.5)
CHLORIDE SERPL-SCNC: 105 MEQ/L (ref 98–111)
CO2 SERPL-SCNC: 23 MEQ/L (ref 23–33)
CREAT SERPL-MCNC: 1 MG/DL (ref 0.4–1.2)
DEPRECATED RDW RBC AUTO: 42.8 FL (ref 35–45)
ERYTHROCYTE [DISTWIDTH] IN BLOOD BY AUTOMATED COUNT: 13.9 % (ref 11.5–14.5)
GFR SERPL CREATININE-BSD FRML MDRD: > 90 ML/MIN/1.73M2
GLUCOSE BLD STRIP.AUTO-MCNC: 112 MG/DL (ref 70–108)
GLUCOSE SERPL-MCNC: 127 MG/DL (ref 70–108)
HCT VFR BLD AUTO: 43 % (ref 42–52)
HGB BLD-MCNC: 14.8 GM/DL (ref 14–18)
MAGNESIUM SERPL-MCNC: 2.1 MG/DL (ref 1.6–2.4)
MCH RBC QN AUTO: 29.5 PG (ref 26–33)
MCHC RBC AUTO-ENTMCNC: 34.4 GM/DL (ref 32.2–35.5)
MCV RBC AUTO: 85.7 FL (ref 80–94)
PHOSPHATE SERPL-MCNC: 2.2 MG/DL (ref 2.4–4.7)
PLATELET # BLD AUTO: 188 THOU/MM3 (ref 130–400)
PMV BLD AUTO: 11.4 FL (ref 9.4–12.4)
POTASSIUM SERPL-SCNC: 4.1 MEQ/L (ref 3.5–5.2)
RBC # BLD AUTO: 5.02 MILL/MM3 (ref 4.7–6.1)
SODIUM SERPL-SCNC: 139 MEQ/L (ref 135–145)
WBC # BLD AUTO: 15.9 THOU/MM3 (ref 4.8–10.8)

## 2024-07-30 PROCEDURE — APPSS30 APP SPLIT SHARED TIME 16-30 MINUTES: Performed by: NURSE PRACTITIONER

## 2024-07-30 PROCEDURE — 2580000003 HC RX 258: Performed by: SURGERY

## 2024-07-30 PROCEDURE — 2500000003 HC RX 250 WO HCPCS: Performed by: SURGERY

## 2024-07-30 PROCEDURE — 82330 ASSAY OF CALCIUM: CPT

## 2024-07-30 PROCEDURE — 6360000002 HC RX W HCPCS: Performed by: NURSE PRACTITIONER

## 2024-07-30 PROCEDURE — 82948 REAGENT STRIP/BLOOD GLUCOSE: CPT

## 2024-07-30 PROCEDURE — 6370000000 HC RX 637 (ALT 250 FOR IP): Performed by: PHYSICIAN ASSISTANT

## 2024-07-30 PROCEDURE — 84100 ASSAY OF PHOSPHORUS: CPT

## 2024-07-30 PROCEDURE — 83735 ASSAY OF MAGNESIUM: CPT

## 2024-07-30 PROCEDURE — 1200000000 HC SEMI PRIVATE

## 2024-07-30 PROCEDURE — 85027 COMPLETE CBC AUTOMATED: CPT

## 2024-07-30 PROCEDURE — 2580000003 HC RX 258: Performed by: NURSE PRACTITIONER

## 2024-07-30 PROCEDURE — 99024 POSTOP FOLLOW-UP VISIT: CPT | Performed by: NURSE PRACTITIONER

## 2024-07-30 PROCEDURE — 36415 COLL VENOUS BLD VENIPUNCTURE: CPT

## 2024-07-30 PROCEDURE — 6370000000 HC RX 637 (ALT 250 FOR IP): Performed by: SURGERY

## 2024-07-30 PROCEDURE — 2500000003 HC RX 250 WO HCPCS: Performed by: NURSE PRACTITIONER

## 2024-07-30 PROCEDURE — 6360000002 HC RX W HCPCS: Performed by: SURGERY

## 2024-07-30 PROCEDURE — 80048 BASIC METABOLIC PNL TOTAL CA: CPT

## 2024-07-30 RX ORDER — INSULIN LISPRO 100 [IU]/ML
0-8 INJECTION, SOLUTION INTRAVENOUS; SUBCUTANEOUS EVERY 6 HOURS
Status: DISCONTINUED | OUTPATIENT
Start: 2024-07-31 | End: 2024-08-05 | Stop reason: HOSPADM

## 2024-07-30 RX ORDER — GLUCAGON 1 MG/ML
1 KIT INJECTION PRN
Status: DISCONTINUED | OUTPATIENT
Start: 2024-07-30 | End: 2024-08-05 | Stop reason: HOSPADM

## 2024-07-30 RX ORDER — DEXTROSE MONOHYDRATE 100 MG/ML
INJECTION, SOLUTION INTRAVENOUS CONTINUOUS PRN
Status: DISCONTINUED | OUTPATIENT
Start: 2024-07-30 | End: 2024-08-05 | Stop reason: HOSPADM

## 2024-07-30 RX ADMIN — SODIUM PHOSPHATE, MONOBASIC, MONOHYDRATE AND SODIUM PHOSPHATE, DIBASIC, ANHYDROUS 15 MMOL: 142; 276 INJECTION, SOLUTION INTRAVENOUS at 13:42

## 2024-07-30 RX ADMIN — ONDANSETRON 4 MG: 4 TABLET, ORALLY DISINTEGRATING ORAL at 11:45

## 2024-07-30 RX ADMIN — HYDROMORPHONE HYDROCHLORIDE 0.5 MG: 1 INJECTION, SOLUTION INTRAMUSCULAR; INTRAVENOUS; SUBCUTANEOUS at 06:51

## 2024-07-30 RX ADMIN — HYDROMORPHONE HYDROCHLORIDE 0.5 MG: 1 INJECTION, SOLUTION INTRAMUSCULAR; INTRAVENOUS; SUBCUTANEOUS at 16:08

## 2024-07-30 RX ADMIN — ONDANSETRON 4 MG: 2 INJECTION INTRAMUSCULAR; INTRAVENOUS at 04:54

## 2024-07-30 RX ADMIN — HYDROMORPHONE HYDROCHLORIDE 0.5 MG: 1 INJECTION, SOLUTION INTRAMUSCULAR; INTRAVENOUS; SUBCUTANEOUS at 03:47

## 2024-07-30 RX ADMIN — CYCLOBENZAPRINE 10 MG: 10 TABLET, FILM COATED ORAL at 19:42

## 2024-07-30 RX ADMIN — NALOXEGOL OXALATE 25 MG: 25 TABLET, FILM COATED ORAL at 09:17

## 2024-07-30 RX ADMIN — ONDANSETRON 4 MG: 2 INJECTION INTRAMUSCULAR; INTRAVENOUS at 19:39

## 2024-07-30 RX ADMIN — SODIUM CHLORIDE: 234 INJECTION, SOLUTION INTRAVENOUS at 17:06

## 2024-07-30 RX ADMIN — PIPERACILLIN AND TAZOBACTAM 3375 MG: 3; .375 INJECTION, POWDER, FOR SOLUTION INTRAVENOUS at 20:54

## 2024-07-30 RX ADMIN — PIPERACILLIN AND TAZOBACTAM 3375 MG: 3; .375 INJECTION, POWDER, FOR SOLUTION INTRAVENOUS at 13:12

## 2024-07-30 RX ADMIN — POTASSIUM CHLORIDE, DEXTROSE MONOHYDRATE AND SODIUM CHLORIDE: 150; 5; 450 INJECTION, SOLUTION INTRAVENOUS at 11:37

## 2024-07-30 RX ADMIN — HYDROMORPHONE HYDROCHLORIDE 0.5 MG: 1 INJECTION, SOLUTION INTRAMUSCULAR; INTRAVENOUS; SUBCUTANEOUS at 19:28

## 2024-07-30 RX ADMIN — SODIUM CHLORIDE, PRESERVATIVE FREE 10 ML: 5 INJECTION INTRAVENOUS at 19:39

## 2024-07-30 RX ADMIN — HYDROMORPHONE HYDROCHLORIDE 0.5 MG: 1 INJECTION, SOLUTION INTRAMUSCULAR; INTRAVENOUS; SUBCUTANEOUS at 13:06

## 2024-07-30 RX ADMIN — HYDROMORPHONE HYDROCHLORIDE 0.5 MG: 1 INJECTION, SOLUTION INTRAMUSCULAR; INTRAVENOUS; SUBCUTANEOUS at 00:33

## 2024-07-30 RX ADMIN — POTASSIUM CHLORIDE, DEXTROSE MONOHYDRATE AND SODIUM CHLORIDE: 150; 5; 450 INJECTION, SOLUTION INTRAVENOUS at 01:28

## 2024-07-30 RX ADMIN — HYDROMORPHONE HYDROCHLORIDE 0.5 MG: 1 INJECTION, SOLUTION INTRAMUSCULAR; INTRAVENOUS; SUBCUTANEOUS at 09:56

## 2024-07-30 RX ADMIN — HYDROMORPHONE HYDROCHLORIDE 0.5 MG: 1 INJECTION, SOLUTION INTRAMUSCULAR; INTRAVENOUS; SUBCUTANEOUS at 23:06

## 2024-07-30 RX ADMIN — CYCLOBENZAPRINE 10 MG: 10 TABLET, FILM COATED ORAL at 11:45

## 2024-07-30 RX ADMIN — PIPERACILLIN AND TAZOBACTAM 3375 MG: 3; .375 INJECTION, POWDER, FOR SOLUTION INTRAVENOUS at 04:55

## 2024-07-30 RX ADMIN — CYCLOBENZAPRINE 10 MG: 10 TABLET, FILM COATED ORAL at 03:47

## 2024-07-30 ASSESSMENT — PAIN DESCRIPTION - ORIENTATION
ORIENTATION: MID

## 2024-07-30 ASSESSMENT — PAIN DESCRIPTION - FREQUENCY
FREQUENCY: CONTINUOUS

## 2024-07-30 ASSESSMENT — PAIN SCALES - GENERAL
PAINLEVEL_OUTOF10: 5
PAINLEVEL_OUTOF10: 4
PAINLEVEL_OUTOF10: 5
PAINLEVEL_OUTOF10: 7
PAINLEVEL_OUTOF10: 6
PAINLEVEL_OUTOF10: 7
PAINLEVEL_OUTOF10: 4
PAINLEVEL_OUTOF10: 7
PAINLEVEL_OUTOF10: 8

## 2024-07-30 ASSESSMENT — PAIN - FUNCTIONAL ASSESSMENT

## 2024-07-30 ASSESSMENT — PAIN DESCRIPTION - LOCATION
LOCATION: ABDOMEN

## 2024-07-30 ASSESSMENT — PAIN DESCRIPTION - DESCRIPTORS
DESCRIPTORS: ACHING
DESCRIPTORS: SHARP
DESCRIPTORS: ACHING;SPASM
DESCRIPTORS: ACHING;SPASM
DESCRIPTORS: ACHING
DESCRIPTORS: ACHING;SPASM
DESCRIPTORS: ACHING

## 2024-07-30 ASSESSMENT — PAIN DESCRIPTION - ONSET
ONSET: ON-GOING

## 2024-07-30 ASSESSMENT — PAIN DESCRIPTION - PAIN TYPE
TYPE: SURGICAL PAIN

## 2024-07-30 NOTE — FLOWSHEET NOTE
07/29/24 1909   Treatment Team Notification   Reason for Communication Patient/Family request   Name of Team Member Notified Corinna Matthews   Treatment Team Role Physician Assistant   Method of Communication Secure Message   Response See orders   Notification Time 1909     Pt c/o muscle spasms, flexeril ordered

## 2024-07-30 NOTE — OP NOTE
Drain was placed in the subcutaneous space on top of the mesh.  The skin was then reapproximated interrupted Vicryl.  The skin was then closed with a running Vicryl.  The stoma site was pursestring sutured with a Vicryl suture.  The drain was cut to suction.  Sterile dressing was applied the patient was woken anesthesia and transferred the PACU in stable condition all counts were correct.    Electronically signed by Caroline Covington MD on 7/30/2024 at 3:09 PM

## 2024-07-30 NOTE — CONSULTS
flowsheet   Pertinent Labs:   Lab Results   Component Value Date    LABA1C 5.1 06/28/2024    LABA1C 4.9 03/28/2023    LABA1C 5.3 05/28/2020     Recent Labs     07/30/24  0638      K 4.1      GLUCOSE 127*   BUN 10   CREATININE 1.0     Pertinent Medications: lovenox (held), movantik, zosyn    Current Nutrition Intake & Therapies:    Recent PO intake: NPO  Recent Supplement Intake: NPO   Diet NPO  Current Parenteral Nutrition Orders:  Type and Formula: 3-in-1 Standard   Lipids: Daily  Duration: Continuous  Rate/Volume: 100 ml/hr  Goal PN Orders Provides: Initiate TPN with a dosing weight of 86 kg; 10 kcal/kg, 1 g pro/kg, and 20% kcals from lipid. This will provide 860 kcal, 86 g pro, 101 g dextrose, and 17.2 g lipid in 24 hours.    Anthropometric Measures:  Height: 182.9 cm (6')  Ideal Body Weight (IBW): 178 lbs  Admission Body Gustavo: 99.8 kg (220 lb) (7/29, no edema)  Current Body Weight: 99.8 kg (220 lb) (7/29, no edema)  Current BMI: Body mass index is 29.84 kg/m².  Usual Body Weight:  (7/11/24: 223 lb; 4/16/24: 210 lb; 3/26/24: 205 lb; 2/14/24: 231 lb; 1/19/24: 236 lb)  Weight Adjustment for: No Adjustment  BMI Categories: Overweight (25-29.9)    Estimated Daily Nutrient Needs:  Energy (kcal/day): 81 kg  Weight Used for energy calculation: 4960-5363 kcal (25-30 kcal/kg)   Protein (g/day): 122-162 g (1.5-2 g/kg IBW)    Weight Used for protein calculation: 81 kg    Fluid (ml/day): per MD     Nutrition Diagnosis:   Increased nutrient needs related to increase demand for energy/nutrients, altered GI function, altered GI structure as evidenced by wounds    Nutrition Interventions:   Nutrition and/ or Nutrient Delivery: Continue NPO, Start Parenteral Nutrition   Nutrition Education/ Counseling: Education initiated (discussed TPN)   Coordination of Nutrition Care: Continue to monitor while inpatient     Goals:  Goals: Initiate nutrition support, by next RD assessment     Nutrition Monitoring and Evaluation:

## 2024-07-31 LAB
ANION GAP SERPL CALC-SCNC: 7 MEQ/L (ref 8–16)
BUN SERPL-MCNC: 12 MG/DL (ref 7–22)
CA-I BLD ISE-SCNC: 1.16 MMOL/L (ref 1.12–1.32)
CALCIUM SERPL-MCNC: 8.7 MG/DL (ref 8.5–10.5)
CHLORIDE SERPL-SCNC: 104 MEQ/L (ref 98–111)
CO2 SERPL-SCNC: 30 MEQ/L (ref 23–33)
CREAT SERPL-MCNC: 1 MG/DL (ref 0.4–1.2)
DEPRECATED RDW RBC AUTO: 44.8 FL (ref 35–45)
ERYTHROCYTE [DISTWIDTH] IN BLOOD BY AUTOMATED COUNT: 14 % (ref 11.5–14.5)
GFR SERPL CREATININE-BSD FRML MDRD: > 90 ML/MIN/1.73M2
GLUCOSE BLD STRIP.AUTO-MCNC: 109 MG/DL (ref 70–108)
GLUCOSE BLD STRIP.AUTO-MCNC: 85 MG/DL (ref 70–108)
GLUCOSE BLD STRIP.AUTO-MCNC: 96 MG/DL (ref 70–108)
GLUCOSE BLD STRIP.AUTO-MCNC: 96 MG/DL (ref 70–108)
GLUCOSE SERPL-MCNC: 117 MG/DL (ref 70–108)
HCT VFR BLD AUTO: 43.9 % (ref 42–52)
HGB BLD-MCNC: 14.6 GM/DL (ref 14–18)
MAGNESIUM SERPL-MCNC: 2.4 MG/DL (ref 1.6–2.4)
MCH RBC QN AUTO: 29.4 PG (ref 26–33)
MCHC RBC AUTO-ENTMCNC: 33.3 GM/DL (ref 32.2–35.5)
MCV RBC AUTO: 88.5 FL (ref 80–94)
PHOSPHATE SERPL-MCNC: 2 MG/DL (ref 2.4–4.7)
PLATELET # BLD AUTO: 177 THOU/MM3 (ref 130–400)
PMV BLD AUTO: 11.6 FL (ref 9.4–12.4)
POTASSIUM SERPL-SCNC: 4.2 MEQ/L (ref 3.5–5.2)
POTASSIUM SERPL-SCNC: 5.2 MEQ/L (ref 3.5–5.2)
RBC # BLD AUTO: 4.96 MILL/MM3 (ref 4.7–6.1)
SODIUM SERPL-SCNC: 141 MEQ/L (ref 135–145)
WBC # BLD AUTO: 12.5 THOU/MM3 (ref 4.8–10.8)

## 2024-07-31 PROCEDURE — 82948 REAGENT STRIP/BLOOD GLUCOSE: CPT

## 2024-07-31 PROCEDURE — 2500000003 HC RX 250 WO HCPCS: Performed by: NURSE PRACTITIONER

## 2024-07-31 PROCEDURE — 36415 COLL VENOUS BLD VENIPUNCTURE: CPT

## 2024-07-31 PROCEDURE — 99024 POSTOP FOLLOW-UP VISIT: CPT | Performed by: NURSE PRACTITIONER

## 2024-07-31 PROCEDURE — 6370000000 HC RX 637 (ALT 250 FOR IP): Performed by: PHYSICIAN ASSISTANT

## 2024-07-31 PROCEDURE — 82330 ASSAY OF CALCIUM: CPT

## 2024-07-31 PROCEDURE — 83735 ASSAY OF MAGNESIUM: CPT

## 2024-07-31 PROCEDURE — 6370000000 HC RX 637 (ALT 250 FOR IP): Performed by: NURSE PRACTITIONER

## 2024-07-31 PROCEDURE — 2580000003 HC RX 258: Performed by: NURSE PRACTITIONER

## 2024-07-31 PROCEDURE — 6360000002 HC RX W HCPCS: Performed by: SURGERY

## 2024-07-31 PROCEDURE — 85027 COMPLETE CBC AUTOMATED: CPT

## 2024-07-31 PROCEDURE — 84132 ASSAY OF SERUM POTASSIUM: CPT

## 2024-07-31 PROCEDURE — 84100 ASSAY OF PHOSPHORUS: CPT

## 2024-07-31 PROCEDURE — 80048 BASIC METABOLIC PNL TOTAL CA: CPT

## 2024-07-31 PROCEDURE — APPSS30 APP SPLIT SHARED TIME 16-30 MINUTES: Performed by: NURSE PRACTITIONER

## 2024-07-31 PROCEDURE — 2580000003 HC RX 258: Performed by: SURGERY

## 2024-07-31 PROCEDURE — 1200000000 HC SEMI PRIVATE

## 2024-07-31 PROCEDURE — 6360000002 HC RX W HCPCS: Performed by: NURSE PRACTITIONER

## 2024-07-31 PROCEDURE — 6370000000 HC RX 637 (ALT 250 FOR IP): Performed by: SURGERY

## 2024-07-31 RX ORDER — 0.9 % SODIUM CHLORIDE 0.9 %
500 INTRAVENOUS SOLUTION INTRAVENOUS ONCE
Status: COMPLETED | OUTPATIENT
Start: 2024-07-31 | End: 2024-07-31

## 2024-07-31 RX ADMIN — ONDANSETRON 4 MG: 4 TABLET, ORALLY DISINTEGRATING ORAL at 14:47

## 2024-07-31 RX ADMIN — SODIUM CHLORIDE 500 ML: 9 INJECTION, SOLUTION INTRAVENOUS at 10:10

## 2024-07-31 RX ADMIN — HYDROMORPHONE HYDROCHLORIDE 0.5 MG: 1 INJECTION, SOLUTION INTRAMUSCULAR; INTRAVENOUS; SUBCUTANEOUS at 08:51

## 2024-07-31 RX ADMIN — HYDROMORPHONE HYDROCHLORIDE 0.5 MG: 1 INJECTION, SOLUTION INTRAMUSCULAR; INTRAVENOUS; SUBCUTANEOUS at 22:14

## 2024-07-31 RX ADMIN — CYCLOBENZAPRINE 10 MG: 10 TABLET, FILM COATED ORAL at 02:27

## 2024-07-31 RX ADMIN — PIPERACILLIN AND TAZOBACTAM 3375 MG: 3; .375 INJECTION, POWDER, FOR SOLUTION INTRAVENOUS at 15:47

## 2024-07-31 RX ADMIN — PHENOL 1 SPRAY: 1.5 LIQUID ORAL at 11:45

## 2024-07-31 RX ADMIN — SODIUM CHLORIDE, PRESERVATIVE FREE 40 MG: 5 INJECTION INTRAVENOUS at 08:52

## 2024-07-31 RX ADMIN — HYDROMORPHONE HYDROCHLORIDE 0.5 MG: 1 INJECTION, SOLUTION INTRAMUSCULAR; INTRAVENOUS; SUBCUTANEOUS at 02:27

## 2024-07-31 RX ADMIN — CYCLOBENZAPRINE 10 MG: 10 TABLET, FILM COATED ORAL at 19:37

## 2024-07-31 RX ADMIN — HYDROMORPHONE HYDROCHLORIDE 0.5 MG: 1 INJECTION, SOLUTION INTRAMUSCULAR; INTRAVENOUS; SUBCUTANEOUS at 12:25

## 2024-07-31 RX ADMIN — HYDROMORPHONE HYDROCHLORIDE 0.5 MG: 1 INJECTION, SOLUTION INTRAMUSCULAR; INTRAVENOUS; SUBCUTANEOUS at 18:40

## 2024-07-31 RX ADMIN — HYDROMORPHONE HYDROCHLORIDE 0.5 MG: 1 INJECTION, SOLUTION INTRAMUSCULAR; INTRAVENOUS; SUBCUTANEOUS at 05:33

## 2024-07-31 RX ADMIN — NALOXEGOL OXALATE 25 MG: 25 TABLET, FILM COATED ORAL at 09:03

## 2024-07-31 RX ADMIN — ENOXAPARIN SODIUM 40 MG: 100 INJECTION SUBCUTANEOUS at 09:09

## 2024-07-31 RX ADMIN — SODIUM CHLORIDE, PRESERVATIVE FREE 10 ML: 5 INJECTION INTRAVENOUS at 19:37

## 2024-07-31 RX ADMIN — CYCLOBENZAPRINE 10 MG: 10 TABLET, FILM COATED ORAL at 11:07

## 2024-07-31 RX ADMIN — SODIUM CHLORIDE, PRESERVATIVE FREE 10 ML: 5 INJECTION INTRAVENOUS at 08:51

## 2024-07-31 RX ADMIN — PIPERACILLIN AND TAZOBACTAM 3375 MG: 3; .375 INJECTION, POWDER, FOR SOLUTION INTRAVENOUS at 23:29

## 2024-07-31 RX ADMIN — PIPERACILLIN AND TAZOBACTAM 3375 MG: 3; .375 INJECTION, POWDER, FOR SOLUTION INTRAVENOUS at 05:37

## 2024-07-31 RX ADMIN — SODIUM CHLORIDE: 234 INJECTION, SOLUTION INTRAVENOUS at 17:33

## 2024-07-31 RX ADMIN — HYDROMORPHONE HYDROCHLORIDE 0.5 MG: 1 INJECTION, SOLUTION INTRAMUSCULAR; INTRAVENOUS; SUBCUTANEOUS at 15:47

## 2024-07-31 RX ADMIN — SODIUM PHOSPHATE, MONOBASIC, MONOHYDRATE AND SODIUM PHOSPHATE, DIBASIC, ANHYDROUS 15 MMOL: 142; 276 INJECTION, SOLUTION INTRAVENOUS at 11:17

## 2024-07-31 ASSESSMENT — PAIN SCALES - GENERAL
PAINLEVEL_OUTOF10: 8
PAINLEVEL_OUTOF10: 7
PAINLEVEL_OUTOF10: 8
PAINLEVEL_OUTOF10: 5
PAINLEVEL_OUTOF10: 7
PAINLEVEL_OUTOF10: 7
PAINLEVEL_OUTOF10: 3
PAINLEVEL_OUTOF10: 5
PAINLEVEL_OUTOF10: 3
PAINLEVEL_OUTOF10: 6
PAINLEVEL_OUTOF10: 5
PAINLEVEL_OUTOF10: 5
PAINLEVEL_OUTOF10: 7
PAINLEVEL_OUTOF10: 8
PAINLEVEL_OUTOF10: 9
PAINLEVEL_OUTOF10: 7

## 2024-07-31 ASSESSMENT — PAIN DESCRIPTION - LOCATION
LOCATION: ABDOMEN
LOCATION: THROAT
LOCATION: ABDOMEN

## 2024-07-31 ASSESSMENT — PAIN DESCRIPTION - ORIENTATION
ORIENTATION: RIGHT;LOWER;MID
ORIENTATION: MID
ORIENTATION: LEFT;LOWER
ORIENTATION: LOWER
ORIENTATION: LOWER
ORIENTATION: LEFT;LOWER

## 2024-07-31 ASSESSMENT — PAIN DESCRIPTION - PAIN TYPE
TYPE: SURGICAL PAIN
TYPE: SURGICAL PAIN
TYPE: ACUTE PAIN

## 2024-07-31 ASSESSMENT — PAIN DESCRIPTION - DESCRIPTORS
DESCRIPTORS: SHARP
DESCRIPTORS: ACHING
DESCRIPTORS: SHARP
DESCRIPTORS: SPASM
DESCRIPTORS: BURNING;SHARP
DESCRIPTORS: SHARP
DESCRIPTORS: BURNING

## 2024-07-31 ASSESSMENT — PAIN DESCRIPTION - ONSET
ONSET: ON-GOING

## 2024-07-31 ASSESSMENT — PAIN DESCRIPTION - FREQUENCY
FREQUENCY: CONTINUOUS

## 2024-07-31 ASSESSMENT — PAIN - FUNCTIONAL ASSESSMENT

## 2024-07-31 NOTE — CARE COORDINATION
7/31/24, 2:27 PM EDT    DISCHARGE ON GOING EVALUATION    Noe [facility] day: 2  Location: 6A-04/004-A Reason for admit: Colostomy in place (HCC) [Z93.3]  Incisional hernia with obstruction but no gangrene [K43.0]  Malignant neoplasm of colon, unspecified part of colon (HCC) [C18.9]     Procedures:  7-29-24 Takedown Colostomy with Incisional Hernia Repair with Phasix Mesh     Imaging since last note: No    Barriers to Discharge: NG, [place], PICC with PPN. Dilaudid for pain. Ambulating. WBC's trending down. 12.5 today.    PCP: Iliana Mejia MD  Readmission Risk Score: 10.5    Patient Goals/Plan/Treatment Preferences: Plans home with spouse.

## 2024-08-01 LAB
ANION GAP SERPL CALC-SCNC: 8 MEQ/L (ref 8–16)
BUN SERPL-MCNC: 14 MG/DL (ref 7–22)
CA-I BLD ISE-SCNC: 1.08 MMOL/L (ref 1.12–1.32)
CALCIUM SERPL-MCNC: 8.7 MG/DL (ref 8.5–10.5)
CHLORIDE SERPL-SCNC: 101 MEQ/L (ref 98–111)
CO2 SERPL-SCNC: 28 MEQ/L (ref 23–33)
CREAT SERPL-MCNC: 0.8 MG/DL (ref 0.4–1.2)
DEPRECATED RDW RBC AUTO: 44.1 FL (ref 35–45)
ERYTHROCYTE [DISTWIDTH] IN BLOOD BY AUTOMATED COUNT: 13.6 % (ref 11.5–14.5)
GFR SERPL CREATININE-BSD FRML MDRD: > 90 ML/MIN/1.73M2
GLUCOSE BLD STRIP.AUTO-MCNC: 79 MG/DL (ref 70–108)
GLUCOSE BLD STRIP.AUTO-MCNC: 84 MG/DL (ref 70–108)
GLUCOSE BLD STRIP.AUTO-MCNC: 96 MG/DL (ref 70–108)
GLUCOSE SERPL-MCNC: 106 MG/DL (ref 70–108)
HCT VFR BLD AUTO: 43.7 % (ref 42–52)
HGB BLD-MCNC: 14.6 GM/DL (ref 14–18)
MAGNESIUM SERPL-MCNC: 2.2 MG/DL (ref 1.6–2.4)
MCH RBC QN AUTO: 29.7 PG (ref 26–33)
MCHC RBC AUTO-ENTMCNC: 33.4 GM/DL (ref 32.2–35.5)
MCV RBC AUTO: 89 FL (ref 80–94)
PHOSPHATE SERPL-MCNC: 2.3 MG/DL (ref 2.4–4.7)
PLATELET # BLD AUTO: 179 THOU/MM3 (ref 130–400)
PMV BLD AUTO: 11 FL (ref 9.4–12.4)
POTASSIUM SERPL-SCNC: 4.5 MEQ/L (ref 3.5–5.2)
RBC # BLD AUTO: 4.91 MILL/MM3 (ref 4.7–6.1)
SODIUM SERPL-SCNC: 137 MEQ/L (ref 135–145)
WBC # BLD AUTO: 10.2 THOU/MM3 (ref 4.8–10.8)

## 2024-08-01 PROCEDURE — 6370000000 HC RX 637 (ALT 250 FOR IP): Performed by: SURGERY

## 2024-08-01 PROCEDURE — 82948 REAGENT STRIP/BLOOD GLUCOSE: CPT

## 2024-08-01 PROCEDURE — 2580000003 HC RX 258: Performed by: NURSE PRACTITIONER

## 2024-08-01 PROCEDURE — 6370000000 HC RX 637 (ALT 250 FOR IP): Performed by: PHYSICIAN ASSISTANT

## 2024-08-01 PROCEDURE — 82330 ASSAY OF CALCIUM: CPT

## 2024-08-01 PROCEDURE — 80048 BASIC METABOLIC PNL TOTAL CA: CPT

## 2024-08-01 PROCEDURE — 83735 ASSAY OF MAGNESIUM: CPT

## 2024-08-01 PROCEDURE — 1200000000 HC SEMI PRIVATE

## 2024-08-01 PROCEDURE — 6360000002 HC RX W HCPCS: Performed by: NURSE PRACTITIONER

## 2024-08-01 PROCEDURE — 84100 ASSAY OF PHOSPHORUS: CPT

## 2024-08-01 PROCEDURE — APPSS30 APP SPLIT SHARED TIME 16-30 MINUTES: Performed by: NURSE PRACTITIONER

## 2024-08-01 PROCEDURE — 6360000002 HC RX W HCPCS: Performed by: SURGERY

## 2024-08-01 PROCEDURE — 85027 COMPLETE CBC AUTOMATED: CPT

## 2024-08-01 PROCEDURE — 6370000000 HC RX 637 (ALT 250 FOR IP): Performed by: NURSE PRACTITIONER

## 2024-08-01 PROCEDURE — 36415 COLL VENOUS BLD VENIPUNCTURE: CPT

## 2024-08-01 PROCEDURE — 2500000003 HC RX 250 WO HCPCS: Performed by: NURSE PRACTITIONER

## 2024-08-01 PROCEDURE — 99024 POSTOP FOLLOW-UP VISIT: CPT | Performed by: NURSE PRACTITIONER

## 2024-08-01 PROCEDURE — 2580000003 HC RX 258: Performed by: SURGERY

## 2024-08-01 RX ORDER — LISINOPRIL 10 MG/1
10 TABLET ORAL DAILY
Status: DISCONTINUED | OUTPATIENT
Start: 2024-08-01 | End: 2024-08-05 | Stop reason: HOSPADM

## 2024-08-01 RX ADMIN — PIPERACILLIN AND TAZOBACTAM 3375 MG: 3; .375 INJECTION, POWDER, FOR SOLUTION INTRAVENOUS at 08:05

## 2024-08-01 RX ADMIN — PIPERACILLIN AND TAZOBACTAM 3375 MG: 3; .375 INJECTION, POWDER, FOR SOLUTION INTRAVENOUS at 15:21

## 2024-08-01 RX ADMIN — HYDROMORPHONE HYDROCHLORIDE 0.5 MG: 1 INJECTION, SOLUTION INTRAMUSCULAR; INTRAVENOUS; SUBCUTANEOUS at 08:01

## 2024-08-01 RX ADMIN — PIPERACILLIN AND TAZOBACTAM 3375 MG: 3; .375 INJECTION, POWDER, FOR SOLUTION INTRAVENOUS at 23:20

## 2024-08-01 RX ADMIN — SODIUM CHLORIDE, PRESERVATIVE FREE 10 ML: 5 INJECTION INTRAVENOUS at 07:56

## 2024-08-01 RX ADMIN — LISINOPRIL 10 MG: 10 TABLET ORAL at 20:45

## 2024-08-01 RX ADMIN — CYCLOBENZAPRINE 10 MG: 10 TABLET, FILM COATED ORAL at 11:31

## 2024-08-01 RX ADMIN — CYCLOBENZAPRINE 10 MG: 10 TABLET, FILM COATED ORAL at 20:54

## 2024-08-01 RX ADMIN — PHENOL 1 SPRAY: 1.5 LIQUID ORAL at 08:03

## 2024-08-01 RX ADMIN — HYDROMORPHONE HYDROCHLORIDE 0.5 MG: 1 INJECTION, SOLUTION INTRAMUSCULAR; INTRAVENOUS; SUBCUTANEOUS at 01:59

## 2024-08-01 RX ADMIN — ENOXAPARIN SODIUM 40 MG: 100 INJECTION SUBCUTANEOUS at 08:03

## 2024-08-01 RX ADMIN — SODIUM CHLORIDE, PRESERVATIVE FREE 40 MG: 5 INJECTION INTRAVENOUS at 07:56

## 2024-08-01 RX ADMIN — HYDROMORPHONE HYDROCHLORIDE 0.5 MG: 1 INJECTION, SOLUTION INTRAMUSCULAR; INTRAVENOUS; SUBCUTANEOUS at 11:31

## 2024-08-01 RX ADMIN — HYDROMORPHONE HYDROCHLORIDE 0.5 MG: 1 INJECTION, SOLUTION INTRAMUSCULAR; INTRAVENOUS; SUBCUTANEOUS at 05:01

## 2024-08-01 RX ADMIN — SODIUM PHOSPHATE, MONOBASIC, MONOHYDRATE AND SODIUM PHOSPHATE, DIBASIC, ANHYDROUS 15 MMOL: 142; 276 INJECTION, SOLUTION INTRAVENOUS at 11:15

## 2024-08-01 RX ADMIN — CALCIUM GLUCONATE: 98 INJECTION, SOLUTION INTRAVENOUS at 17:43

## 2024-08-01 RX ADMIN — CYCLOBENZAPRINE 10 MG: 10 TABLET, FILM COATED ORAL at 03:31

## 2024-08-01 RX ADMIN — HYDROMORPHONE HYDROCHLORIDE 0.5 MG: 1 INJECTION, SOLUTION INTRAMUSCULAR; INTRAVENOUS; SUBCUTANEOUS at 17:44

## 2024-08-01 RX ADMIN — HYDROMORPHONE HYDROCHLORIDE 0.5 MG: 1 INJECTION, SOLUTION INTRAMUSCULAR; INTRAVENOUS; SUBCUTANEOUS at 14:38

## 2024-08-01 RX ADMIN — NALOXEGOL OXALATE 25 MG: 25 TABLET, FILM COATED ORAL at 07:56

## 2024-08-01 ASSESSMENT — PAIN DESCRIPTION - FREQUENCY
FREQUENCY: CONTINUOUS
FREQUENCY: INTERMITTENT
FREQUENCY: CONTINUOUS

## 2024-08-01 ASSESSMENT — PAIN SCALES - GENERAL
PAINLEVEL_OUTOF10: 5
PAINLEVEL_OUTOF10: 5
PAINLEVEL_OUTOF10: 4
PAINLEVEL_OUTOF10: 5
PAINLEVEL_OUTOF10: 4
PAINLEVEL_OUTOF10: 5
PAINLEVEL_OUTOF10: 7
PAINLEVEL_OUTOF10: 8
PAINLEVEL_OUTOF10: 7
PAINLEVEL_OUTOF10: 8
PAINLEVEL_OUTOF10: 7
PAINLEVEL_OUTOF10: 5
PAINLEVEL_OUTOF10: 6
PAINLEVEL_OUTOF10: 8

## 2024-08-01 ASSESSMENT — PAIN DESCRIPTION - LOCATION
LOCATION: ABDOMEN
LOCATION: THROAT
LOCATION: ABDOMEN

## 2024-08-01 ASSESSMENT — PAIN DESCRIPTION - DESCRIPTORS
DESCRIPTORS: ACHING;SHARP
DESCRIPTORS: SHARP
DESCRIPTORS: BURNING;SPASM
DESCRIPTORS: SHARP
DESCRIPTORS: SHARP
DESCRIPTORS: SORE
DESCRIPTORS: ACHING
DESCRIPTORS: SPASM
DESCRIPTORS: ACHING

## 2024-08-01 ASSESSMENT — PAIN DESCRIPTION - ORIENTATION
ORIENTATION: MID
ORIENTATION: LEFT;MID
ORIENTATION: MID
ORIENTATION: LOWER
ORIENTATION: LEFT
ORIENTATION: MID

## 2024-08-01 ASSESSMENT — PAIN DESCRIPTION - ONSET
ONSET: ON-GOING

## 2024-08-01 ASSESSMENT — PAIN - FUNCTIONAL ASSESSMENT
PAIN_FUNCTIONAL_ASSESSMENT: PREVENTS OR INTERFERES SOME ACTIVE ACTIVITIES AND ADLS
PAIN_FUNCTIONAL_ASSESSMENT: ACTIVITIES ARE NOT PREVENTED
PAIN_FUNCTIONAL_ASSESSMENT: ACTIVITIES ARE NOT PREVENTED
PAIN_FUNCTIONAL_ASSESSMENT: PREVENTS OR INTERFERES SOME ACTIVE ACTIVITIES AND ADLS
PAIN_FUNCTIONAL_ASSESSMENT: PREVENTS OR INTERFERES SOME ACTIVE ACTIVITIES AND ADLS

## 2024-08-01 ASSESSMENT — PAIN DESCRIPTION - PAIN TYPE
TYPE: SURGICAL PAIN

## 2024-08-01 NOTE — CARE COORDINATION
8/1/24, 2:04 PM EDT    DISCHARGE ON GOING EVALUATION    Noe [facility] day: 3  Location: 6A-04/004-A Reason for admit: Colostomy in place (HCC) [Z93.3]  Incisional hernia with obstruction but no gangrene [K43.0]  Malignant neoplasm of colon, unspecified part of colon (HCC) [C18.9]     Procedures:  7-29-24 Takedown Colostomy with Incisional Hernia Repair with Phasix Mesh     Imaging since last note: No    Barriers to Discharge: POD #4. Await return of bowel function. [place] and NG remain. WBC's now normal. PPN. Zosyn. IV Dilaudid for pain. Movantik.     PCP: Iliana Mejia MD  Readmission Risk Score: 9.8    Patient Goals/Plan/Treatment Preferences: Plans home with spouse.  CM to continue to follow.

## 2024-08-02 LAB
ANION GAP SERPL CALC-SCNC: 9 MEQ/L (ref 8–16)
BUN SERPL-MCNC: 13 MG/DL (ref 7–22)
CA-I BLD ISE-SCNC: 1.2 MMOL/L (ref 1.12–1.32)
CA-I BLD ISE-SCNC: 1.23 MMOL/L (ref 1.12–1.32)
CALCIUM SERPL-MCNC: 9 MG/DL (ref 8.5–10.5)
CHLORIDE SERPL-SCNC: 103 MEQ/L (ref 98–111)
CO2 SERPL-SCNC: 28 MEQ/L (ref 23–33)
CREAT SERPL-MCNC: 0.8 MG/DL (ref 0.4–1.2)
DEPRECATED RDW RBC AUTO: 41.5 FL (ref 35–45)
ERYTHROCYTE [DISTWIDTH] IN BLOOD BY AUTOMATED COUNT: 13.3 % (ref 11.5–14.5)
GFR SERPL CREATININE-BSD FRML MDRD: > 90 ML/MIN/1.73M2
GLUCOSE BLD STRIP.AUTO-MCNC: 94 MG/DL (ref 70–108)
GLUCOSE BLD STRIP.AUTO-MCNC: 99 MG/DL (ref 70–108)
GLUCOSE BLD STRIP.AUTO-MCNC: 99 MG/DL (ref 70–108)
GLUCOSE SERPL-MCNC: 101 MG/DL (ref 70–108)
HCT VFR BLD AUTO: 39.7 % (ref 42–52)
HGB BLD-MCNC: 13.9 GM/DL (ref 14–18)
MAGNESIUM SERPL-MCNC: 1.9 MG/DL (ref 1.6–2.4)
MAGNESIUM SERPL-MCNC: 2.1 MG/DL (ref 1.6–2.4)
MCH RBC QN AUTO: 30.1 PG (ref 26–33)
MCHC RBC AUTO-ENTMCNC: 35 GM/DL (ref 32.2–35.5)
MCV RBC AUTO: 85.9 FL (ref 80–94)
PHOSPHATE SERPL-MCNC: 2.8 MG/DL (ref 2.4–4.7)
PHOSPHATE SERPL-MCNC: 3 MG/DL (ref 2.4–4.7)
PLATELET # BLD AUTO: 211 THOU/MM3 (ref 130–400)
PMV BLD AUTO: 11.2 FL (ref 9.4–12.4)
POTASSIUM SERPL-SCNC: 4.6 MEQ/L (ref 3.5–5.2)
RBC # BLD AUTO: 4.62 MILL/MM3 (ref 4.7–6.1)
SODIUM SERPL-SCNC: 140 MEQ/L (ref 135–145)
WBC # BLD AUTO: 8.1 THOU/MM3 (ref 4.8–10.8)

## 2024-08-02 PROCEDURE — 82330 ASSAY OF CALCIUM: CPT

## 2024-08-02 PROCEDURE — 80048 BASIC METABOLIC PNL TOTAL CA: CPT

## 2024-08-02 PROCEDURE — 2580000003 HC RX 258: Performed by: SURGERY

## 2024-08-02 PROCEDURE — 82948 REAGENT STRIP/BLOOD GLUCOSE: CPT

## 2024-08-02 PROCEDURE — 6370000000 HC RX 637 (ALT 250 FOR IP): Performed by: PHYSICIAN ASSISTANT

## 2024-08-02 PROCEDURE — APPSS30 APP SPLIT SHARED TIME 16-30 MINUTES: Performed by: NURSE PRACTITIONER

## 2024-08-02 PROCEDURE — 85027 COMPLETE CBC AUTOMATED: CPT

## 2024-08-02 PROCEDURE — 6370000000 HC RX 637 (ALT 250 FOR IP): Performed by: NURSE PRACTITIONER

## 2024-08-02 PROCEDURE — 1200000000 HC SEMI PRIVATE

## 2024-08-02 PROCEDURE — 83735 ASSAY OF MAGNESIUM: CPT

## 2024-08-02 PROCEDURE — 36415 COLL VENOUS BLD VENIPUNCTURE: CPT

## 2024-08-02 PROCEDURE — 2500000003 HC RX 250 WO HCPCS: Performed by: NURSE PRACTITIONER

## 2024-08-02 PROCEDURE — 99024 POSTOP FOLLOW-UP VISIT: CPT | Performed by: NURSE PRACTITIONER

## 2024-08-02 PROCEDURE — 6360000002 HC RX W HCPCS: Performed by: SURGERY

## 2024-08-02 PROCEDURE — 6360000002 HC RX W HCPCS: Performed by: NURSE PRACTITIONER

## 2024-08-02 PROCEDURE — 6370000000 HC RX 637 (ALT 250 FOR IP): Performed by: SURGERY

## 2024-08-02 PROCEDURE — 2580000003 HC RX 258: Performed by: NURSE PRACTITIONER

## 2024-08-02 PROCEDURE — 84100 ASSAY OF PHOSPHORUS: CPT

## 2024-08-02 RX ADMIN — HYDROMORPHONE HYDROCHLORIDE 0.5 MG: 1 INJECTION, SOLUTION INTRAMUSCULAR; INTRAVENOUS; SUBCUTANEOUS at 19:29

## 2024-08-02 RX ADMIN — CYCLOBENZAPRINE 10 MG: 10 TABLET, FILM COATED ORAL at 09:00

## 2024-08-02 RX ADMIN — CYCLOBENZAPRINE 10 MG: 10 TABLET, FILM COATED ORAL at 17:08

## 2024-08-02 RX ADMIN — NALOXEGOL OXALATE 25 MG: 25 TABLET, FILM COATED ORAL at 09:00

## 2024-08-02 RX ADMIN — PIPERACILLIN AND TAZOBACTAM 3375 MG: 3; .375 INJECTION, POWDER, FOR SOLUTION INTRAVENOUS at 09:11

## 2024-08-02 RX ADMIN — PIPERACILLIN AND TAZOBACTAM 3375 MG: 3; .375 INJECTION, POWDER, FOR SOLUTION INTRAVENOUS at 16:15

## 2024-08-02 RX ADMIN — HYDROMORPHONE HYDROCHLORIDE 0.5 MG: 1 INJECTION, SOLUTION INTRAMUSCULAR; INTRAVENOUS; SUBCUTANEOUS at 10:33

## 2024-08-02 RX ADMIN — LISINOPRIL 10 MG: 10 TABLET ORAL at 19:48

## 2024-08-02 RX ADMIN — SODIUM CHLORIDE, PRESERVATIVE FREE 10 ML: 5 INJECTION INTRAVENOUS at 09:00

## 2024-08-02 RX ADMIN — SODIUM CHLORIDE, PRESERVATIVE FREE 40 MG: 5 INJECTION INTRAVENOUS at 09:00

## 2024-08-02 RX ADMIN — ENOXAPARIN SODIUM 40 MG: 100 INJECTION SUBCUTANEOUS at 09:00

## 2024-08-02 RX ADMIN — ONDANSETRON 4 MG: 2 INJECTION INTRAMUSCULAR; INTRAVENOUS at 19:29

## 2024-08-02 RX ADMIN — PIPERACILLIN AND TAZOBACTAM 3375 MG: 3; .375 INJECTION, POWDER, FOR SOLUTION INTRAVENOUS at 23:14

## 2024-08-02 RX ADMIN — HYDROMORPHONE HYDROCHLORIDE 0.5 MG: 1 INJECTION, SOLUTION INTRAMUSCULAR; INTRAVENOUS; SUBCUTANEOUS at 01:47

## 2024-08-02 RX ADMIN — CALCIUM GLUCONATE: 98 INJECTION, SOLUTION INTRAVENOUS at 17:20

## 2024-08-02 ASSESSMENT — PAIN DESCRIPTION - DESCRIPTORS
DESCRIPTORS: ACHING;BURNING
DESCRIPTORS: BURNING

## 2024-08-02 ASSESSMENT — PAIN DESCRIPTION - PAIN TYPE: TYPE: SURGICAL PAIN

## 2024-08-02 ASSESSMENT — PAIN SCALES - GENERAL
PAINLEVEL_OUTOF10: 5
PAINLEVEL_OUTOF10: 7
PAINLEVEL_OUTOF10: 4
PAINLEVEL_OUTOF10: 4
PAINLEVEL_OUTOF10: 8
PAINLEVEL_OUTOF10: 5

## 2024-08-02 ASSESSMENT — PAIN DESCRIPTION - ORIENTATION
ORIENTATION: MID
ORIENTATION: LEFT

## 2024-08-02 ASSESSMENT — PAIN DESCRIPTION - LOCATION
LOCATION: ABDOMEN

## 2024-08-03 LAB
ANION GAP SERPL CALC-SCNC: 8 MEQ/L (ref 8–16)
BUN SERPL-MCNC: 16 MG/DL (ref 7–22)
CA-I BLD ISE-SCNC: 1.22 MMOL/L (ref 1.12–1.32)
CALCIUM SERPL-MCNC: 8.9 MG/DL (ref 8.5–10.5)
CHLORIDE SERPL-SCNC: 104 MEQ/L (ref 98–111)
CO2 SERPL-SCNC: 27 MEQ/L (ref 23–33)
CREAT SERPL-MCNC: 0.8 MG/DL (ref 0.4–1.2)
DEPRECATED RDW RBC AUTO: 40.8 FL (ref 35–45)
ERYTHROCYTE [DISTWIDTH] IN BLOOD BY AUTOMATED COUNT: 13.2 % (ref 11.5–14.5)
GFR SERPL CREATININE-BSD FRML MDRD: > 90 ML/MIN/1.73M2
GLUCOSE BLD STRIP.AUTO-MCNC: 83 MG/DL (ref 70–108)
GLUCOSE BLD STRIP.AUTO-MCNC: 90 MG/DL (ref 70–108)
GLUCOSE BLD STRIP.AUTO-MCNC: 92 MG/DL (ref 70–108)
GLUCOSE BLD STRIP.AUTO-MCNC: 98 MG/DL (ref 70–108)
GLUCOSE BLD STRIP.AUTO-MCNC: 98 MG/DL (ref 70–108)
GLUCOSE SERPL-MCNC: 101 MG/DL (ref 70–108)
HCT VFR BLD AUTO: 36.3 % (ref 42–52)
HGB BLD-MCNC: 12.5 GM/DL (ref 14–18)
MAGNESIUM SERPL-MCNC: 1.9 MG/DL (ref 1.6–2.4)
MCH RBC QN AUTO: 29.7 PG (ref 26–33)
MCHC RBC AUTO-ENTMCNC: 34.4 GM/DL (ref 32.2–35.5)
MCV RBC AUTO: 86.2 FL (ref 80–94)
PHOSPHATE SERPL-MCNC: 3.3 MG/DL (ref 2.4–4.7)
PLATELET # BLD AUTO: 208 THOU/MM3 (ref 130–400)
PMV BLD AUTO: 11.1 FL (ref 9.4–12.4)
POTASSIUM SERPL-SCNC: 4.2 MEQ/L (ref 3.5–5.2)
RBC # BLD AUTO: 4.21 MILL/MM3 (ref 4.7–6.1)
SODIUM SERPL-SCNC: 139 MEQ/L (ref 135–145)
WBC # BLD AUTO: 7 THOU/MM3 (ref 4.8–10.8)

## 2024-08-03 PROCEDURE — 6370000000 HC RX 637 (ALT 250 FOR IP): Performed by: SURGERY

## 2024-08-03 PROCEDURE — 6360000002 HC RX W HCPCS: Performed by: SURGERY

## 2024-08-03 PROCEDURE — 6360000002 HC RX W HCPCS: Performed by: NURSE PRACTITIONER

## 2024-08-03 PROCEDURE — 83735 ASSAY OF MAGNESIUM: CPT

## 2024-08-03 PROCEDURE — 2580000003 HC RX 258: Performed by: SURGERY

## 2024-08-03 PROCEDURE — 36415 COLL VENOUS BLD VENIPUNCTURE: CPT

## 2024-08-03 PROCEDURE — 80048 BASIC METABOLIC PNL TOTAL CA: CPT

## 2024-08-03 PROCEDURE — 6370000000 HC RX 637 (ALT 250 FOR IP): Performed by: NURSE PRACTITIONER

## 2024-08-03 PROCEDURE — 85027 COMPLETE CBC AUTOMATED: CPT

## 2024-08-03 PROCEDURE — 82948 REAGENT STRIP/BLOOD GLUCOSE: CPT

## 2024-08-03 PROCEDURE — 2580000003 HC RX 258: Performed by: NURSE PRACTITIONER

## 2024-08-03 PROCEDURE — 6370000000 HC RX 637 (ALT 250 FOR IP): Performed by: PHYSICIAN ASSISTANT

## 2024-08-03 PROCEDURE — 1200000000 HC SEMI PRIVATE

## 2024-08-03 PROCEDURE — 82330 ASSAY OF CALCIUM: CPT

## 2024-08-03 PROCEDURE — 84100 ASSAY OF PHOSPHORUS: CPT

## 2024-08-03 PROCEDURE — 2500000003 HC RX 250 WO HCPCS: Performed by: SURGERY

## 2024-08-03 PROCEDURE — 99024 POSTOP FOLLOW-UP VISIT: CPT | Performed by: SURGERY

## 2024-08-03 RX ADMIN — CALCIUM GLUCONATE: 98 INJECTION, SOLUTION INTRAVENOUS at 16:15

## 2024-08-03 RX ADMIN — NALOXEGOL OXALATE 25 MG: 25 TABLET, FILM COATED ORAL at 07:40

## 2024-08-03 RX ADMIN — SODIUM CHLORIDE, PRESERVATIVE FREE 40 MG: 5 INJECTION INTRAVENOUS at 07:40

## 2024-08-03 RX ADMIN — CYCLOBENZAPRINE 10 MG: 10 TABLET, FILM COATED ORAL at 03:29

## 2024-08-03 RX ADMIN — ONDANSETRON 4 MG: 2 INJECTION INTRAMUSCULAR; INTRAVENOUS at 05:45

## 2024-08-03 RX ADMIN — HYDROMORPHONE HYDROCHLORIDE 0.5 MG: 1 INJECTION, SOLUTION INTRAMUSCULAR; INTRAVENOUS; SUBCUTANEOUS at 05:34

## 2024-08-03 RX ADMIN — PIPERACILLIN AND TAZOBACTAM 3375 MG: 3; .375 INJECTION, POWDER, FOR SOLUTION INTRAVENOUS at 07:46

## 2024-08-03 RX ADMIN — HYDROMORPHONE HYDROCHLORIDE 0.5 MG: 1 INJECTION, SOLUTION INTRAMUSCULAR; INTRAVENOUS; SUBCUTANEOUS at 23:53

## 2024-08-03 RX ADMIN — PIPERACILLIN AND TAZOBACTAM 3375 MG: 3; .375 INJECTION, POWDER, FOR SOLUTION INTRAVENOUS at 23:53

## 2024-08-03 RX ADMIN — ENOXAPARIN SODIUM 40 MG: 100 INJECTION SUBCUTANEOUS at 07:40

## 2024-08-03 RX ADMIN — CYCLOBENZAPRINE 10 MG: 10 TABLET, FILM COATED ORAL at 21:14

## 2024-08-03 RX ADMIN — PIPERACILLIN AND TAZOBACTAM 3375 MG: 3; .375 INJECTION, POWDER, FOR SOLUTION INTRAVENOUS at 14:58

## 2024-08-03 RX ADMIN — LISINOPRIL 10 MG: 10 TABLET ORAL at 19:47

## 2024-08-03 ASSESSMENT — PAIN DESCRIPTION - LOCATION
LOCATION: ABDOMEN

## 2024-08-03 ASSESSMENT — PAIN DESCRIPTION - DESCRIPTORS
DESCRIPTORS: BURNING
DESCRIPTORS: BURNING
DESCRIPTORS: SORE
DESCRIPTORS: SPASM
DESCRIPTORS: ACHING;SORE

## 2024-08-03 ASSESSMENT — PAIN - FUNCTIONAL ASSESSMENT
PAIN_FUNCTIONAL_ASSESSMENT: ACTIVITIES ARE NOT PREVENTED

## 2024-08-03 ASSESSMENT — PAIN SCALES - GENERAL
PAINLEVEL_OUTOF10: 8
PAINLEVEL_OUTOF10: 5
PAINLEVEL_OUTOF10: 8
PAINLEVEL_OUTOF10: 6
PAINLEVEL_OUTOF10: 4
PAINLEVEL_OUTOF10: 4
PAINLEVEL_OUTOF10: 8

## 2024-08-03 ASSESSMENT — PAIN DESCRIPTION - ORIENTATION
ORIENTATION: UPPER
ORIENTATION: UPPER
ORIENTATION: LOWER
ORIENTATION: LOWER
ORIENTATION: MID

## 2024-08-03 ASSESSMENT — PAIN DESCRIPTION - PAIN TYPE: TYPE: SURGICAL PAIN

## 2024-08-03 ASSESSMENT — PAIN DESCRIPTION - FREQUENCY: FREQUENCY: CONTINUOUS

## 2024-08-03 ASSESSMENT — PAIN DESCRIPTION - ONSET: ONSET: ON-GOING

## 2024-08-04 LAB
GLUCOSE BLD STRIP.AUTO-MCNC: 108 MG/DL (ref 70–108)
GLUCOSE BLD STRIP.AUTO-MCNC: 82 MG/DL (ref 70–108)
GLUCOSE BLD STRIP.AUTO-MCNC: 88 MG/DL (ref 70–108)
GLUCOSE BLD STRIP.AUTO-MCNC: 94 MG/DL (ref 70–108)

## 2024-08-04 PROCEDURE — 82948 REAGENT STRIP/BLOOD GLUCOSE: CPT

## 2024-08-04 PROCEDURE — 6360000002 HC RX W HCPCS: Performed by: PHARMACIST

## 2024-08-04 PROCEDURE — 6360000002 HC RX W HCPCS: Performed by: SURGERY

## 2024-08-04 PROCEDURE — 2580000003 HC RX 258: Performed by: NURSE PRACTITIONER

## 2024-08-04 PROCEDURE — 6370000000 HC RX 637 (ALT 250 FOR IP): Performed by: SURGERY

## 2024-08-04 PROCEDURE — 6360000002 HC RX W HCPCS: Performed by: NURSE PRACTITIONER

## 2024-08-04 PROCEDURE — 2580000003 HC RX 258: Performed by: SURGERY

## 2024-08-04 PROCEDURE — 2500000003 HC RX 250 WO HCPCS: Performed by: PHARMACIST

## 2024-08-04 PROCEDURE — 1200000000 HC SEMI PRIVATE

## 2024-08-04 PROCEDURE — 99024 POSTOP FOLLOW-UP VISIT: CPT | Performed by: NURSE PRACTITIONER

## 2024-08-04 PROCEDURE — 6370000000 HC RX 637 (ALT 250 FOR IP): Performed by: PHYSICIAN ASSISTANT

## 2024-08-04 PROCEDURE — APPSS45 APP SPLIT SHARED TIME 31-45 MINUTES: Performed by: NURSE PRACTITIONER

## 2024-08-04 PROCEDURE — 6370000000 HC RX 637 (ALT 250 FOR IP): Performed by: NURSE PRACTITIONER

## 2024-08-04 PROCEDURE — 2580000003 HC RX 258: Performed by: PHARMACIST

## 2024-08-04 RX ADMIN — SODIUM CHLORIDE, PRESERVATIVE FREE 40 MG: 5 INJECTION INTRAVENOUS at 07:41

## 2024-08-04 RX ADMIN — LISINOPRIL 10 MG: 10 TABLET ORAL at 21:18

## 2024-08-04 RX ADMIN — CYCLOBENZAPRINE 10 MG: 10 TABLET, FILM COATED ORAL at 21:18

## 2024-08-04 RX ADMIN — PIPERACILLIN AND TAZOBACTAM 3375 MG: 3; .375 INJECTION, POWDER, FOR SOLUTION INTRAVENOUS at 16:01

## 2024-08-04 RX ADMIN — CALCIUM GLUCONATE: 98 INJECTION, SOLUTION INTRAVENOUS at 18:07

## 2024-08-04 RX ADMIN — NALOXEGOL OXALATE 25 MG: 25 TABLET, FILM COATED ORAL at 07:41

## 2024-08-04 RX ADMIN — ENOXAPARIN SODIUM 40 MG: 100 INJECTION SUBCUTANEOUS at 07:41

## 2024-08-04 RX ADMIN — PIPERACILLIN AND TAZOBACTAM 3375 MG: 3; .375 INJECTION, POWDER, FOR SOLUTION INTRAVENOUS at 07:42

## 2024-08-04 RX ADMIN — PIPERACILLIN AND TAZOBACTAM 3375 MG: 3; .375 INJECTION, POWDER, FOR SOLUTION INTRAVENOUS at 23:30

## 2024-08-04 RX ADMIN — HYDROMORPHONE HYDROCHLORIDE 0.5 MG: 1 INJECTION, SOLUTION INTRAMUSCULAR; INTRAVENOUS; SUBCUTANEOUS at 21:59

## 2024-08-04 RX ADMIN — HYALURONIDASE (HUMAN RECOMBINANT) 150 UNITS: 150 INJECTION, SOLUTION SUBCUTANEOUS at 21:52

## 2024-08-04 ASSESSMENT — PAIN - FUNCTIONAL ASSESSMENT: PAIN_FUNCTIONAL_ASSESSMENT: ACTIVITIES ARE NOT PREVENTED

## 2024-08-04 ASSESSMENT — PAIN DESCRIPTION - LOCATION: LOCATION: ABDOMEN

## 2024-08-04 ASSESSMENT — PAIN DESCRIPTION - ONSET: ONSET: ON-GOING

## 2024-08-04 ASSESSMENT — PAIN DESCRIPTION - PAIN TYPE: TYPE: SURGICAL PAIN

## 2024-08-04 ASSESSMENT — PAIN SCALES - GENERAL
PAINLEVEL_OUTOF10: 5
PAINLEVEL_OUTOF10: 7
PAINLEVEL_OUTOF10: 5

## 2024-08-04 ASSESSMENT — PAIN DESCRIPTION - FREQUENCY: FREQUENCY: CONTINUOUS

## 2024-08-04 ASSESSMENT — PAIN DESCRIPTION - DESCRIPTORS: DESCRIPTORS: ACHING

## 2024-08-04 ASSESSMENT — PAIN DESCRIPTION - ORIENTATION: ORIENTATION: UPPER

## 2024-08-05 VITALS
OXYGEN SATURATION: 98 % | RESPIRATION RATE: 16 BRPM | SYSTOLIC BLOOD PRESSURE: 125 MMHG | DIASTOLIC BLOOD PRESSURE: 78 MMHG | HEART RATE: 74 BPM | TEMPERATURE: 98.6 F | WEIGHT: 218.5 LBS | BODY MASS INDEX: 29.59 KG/M2 | HEIGHT: 72 IN

## 2024-08-05 LAB
ANION GAP SERPL CALC-SCNC: 9 MEQ/L (ref 8–16)
BUN SERPL-MCNC: 14 MG/DL (ref 7–22)
CA-I BLD ISE-SCNC: 1.22 MMOL/L (ref 1.12–1.32)
CALCIUM SERPL-MCNC: 9.5 MG/DL (ref 8.5–10.5)
CHLORIDE SERPL-SCNC: 102 MEQ/L (ref 98–111)
CO2 SERPL-SCNC: 28 MEQ/L (ref 23–33)
CREAT SERPL-MCNC: 0.8 MG/DL (ref 0.4–1.2)
GFR SERPL CREATININE-BSD FRML MDRD: > 90 ML/MIN/1.73M2
GLUCOSE BLD STRIP.AUTO-MCNC: 107 MG/DL (ref 70–108)
GLUCOSE SERPL-MCNC: 101 MG/DL (ref 70–108)
HCT VFR BLD AUTO: 41.2 % (ref 42–52)
HGB BLD-MCNC: 13.8 GM/DL (ref 14–18)
MAGNESIUM SERPL-MCNC: 2.1 MG/DL (ref 1.6–2.4)
PHOSPHATE SERPL-MCNC: 3.4 MG/DL (ref 2.4–4.7)
POTASSIUM SERPL-SCNC: 4.4 MEQ/L (ref 3.5–5.2)
SODIUM SERPL-SCNC: 139 MEQ/L (ref 135–145)

## 2024-08-05 PROCEDURE — 82948 REAGENT STRIP/BLOOD GLUCOSE: CPT

## 2024-08-05 PROCEDURE — 6360000002 HC RX W HCPCS: Performed by: SURGERY

## 2024-08-05 PROCEDURE — 99024 POSTOP FOLLOW-UP VISIT: CPT | Performed by: NURSE PRACTITIONER

## 2024-08-05 PROCEDURE — 36415 COLL VENOUS BLD VENIPUNCTURE: CPT

## 2024-08-05 PROCEDURE — 6370000000 HC RX 637 (ALT 250 FOR IP): Performed by: PHYSICIAN ASSISTANT

## 2024-08-05 PROCEDURE — 82330 ASSAY OF CALCIUM: CPT

## 2024-08-05 PROCEDURE — 2580000003 HC RX 258: Performed by: SURGERY

## 2024-08-05 PROCEDURE — APPSS30 APP SPLIT SHARED TIME 16-30 MINUTES: Performed by: NURSE PRACTITIONER

## 2024-08-05 PROCEDURE — 83735 ASSAY OF MAGNESIUM: CPT

## 2024-08-05 PROCEDURE — 85018 HEMOGLOBIN: CPT

## 2024-08-05 PROCEDURE — 85014 HEMATOCRIT: CPT

## 2024-08-05 PROCEDURE — 84100 ASSAY OF PHOSPHORUS: CPT

## 2024-08-05 PROCEDURE — 80048 BASIC METABOLIC PNL TOTAL CA: CPT

## 2024-08-05 PROCEDURE — 2580000003 HC RX 258: Performed by: NURSE PRACTITIONER

## 2024-08-05 PROCEDURE — 6360000002 HC RX W HCPCS: Performed by: NURSE PRACTITIONER

## 2024-08-05 RX ORDER — HYDROCODONE BITARTRATE AND ACETAMINOPHEN 5; 325 MG/1; MG/1
1 TABLET ORAL EVERY 6 HOURS PRN
Qty: 10 TABLET | Refills: 0 | Status: SHIPPED | OUTPATIENT
Start: 2024-08-05 | End: 2024-08-12

## 2024-08-05 RX ORDER — SULFAMETHOXAZOLE AND TRIMETHOPRIM 800; 160 MG/1; MG/1
1 TABLET ORAL 2 TIMES DAILY
Qty: 20 TABLET | Refills: 0 | Status: SHIPPED | OUTPATIENT
Start: 2024-08-05 | End: 2024-08-15

## 2024-08-05 RX ORDER — HYDROCODONE BITARTRATE AND ACETAMINOPHEN 5; 325 MG/1; MG/1
1 TABLET ORAL EVERY 6 HOURS PRN
Status: DISCONTINUED | OUTPATIENT
Start: 2024-08-05 | End: 2024-08-05 | Stop reason: HOSPADM

## 2024-08-05 RX ORDER — CYCLOBENZAPRINE HCL 10 MG
10 TABLET ORAL 3 TIMES DAILY PRN
Qty: 10 TABLET | Refills: 0 | Status: SHIPPED | OUTPATIENT
Start: 2024-08-05 | End: 2024-08-12

## 2024-08-05 RX ADMIN — SODIUM CHLORIDE, PRESERVATIVE FREE 10 ML: 5 INJECTION INTRAVENOUS at 07:46

## 2024-08-05 RX ADMIN — CYCLOBENZAPRINE 10 MG: 10 TABLET, FILM COATED ORAL at 11:55

## 2024-08-05 RX ADMIN — ENOXAPARIN SODIUM 40 MG: 100 INJECTION SUBCUTANEOUS at 07:40

## 2024-08-05 RX ADMIN — SODIUM CHLORIDE, PRESERVATIVE FREE 40 MG: 5 INJECTION INTRAVENOUS at 07:51

## 2024-08-05 RX ADMIN — PIPERACILLIN AND TAZOBACTAM 3375 MG: 3; .375 INJECTION, POWDER, FOR SOLUTION INTRAVENOUS at 07:46

## 2024-08-05 ASSESSMENT — PAIN DESCRIPTION - LOCATION: LOCATION: ABDOMEN

## 2024-08-05 ASSESSMENT — PAIN SCALES - GENERAL: PAINLEVEL_OUTOF10: 3

## 2024-08-05 NOTE — PLAN OF CARE
Problem: Discharge Planning  Goal: Discharge to home or other facility with appropriate resources  7/29/2024 2148 by Rosa Maria Chilel, RN  Outcome: Progressing  Flowsheets (Taken 7/29/2024 2148)  Discharge to home or other facility with appropriate resources:   Identify barriers to discharge with patient and caregiver   Identify discharge learning needs (meds, wound care, etc)   Refer to discharge planning if patient needs post-hospital services based on physician order or complex needs related to functional status, cognitive ability or social support system   Arrange for needed discharge resources and transportation as appropriate     Problem: Pain  Goal: Verbalizes/displays adequate comfort level or baseline comfort level  7/29/2024 2148 by Rosa Maria Chilel, RN  Outcome: Progressing  Flowsheets (Taken 7/29/2024 2148)  Verbalizes/displays adequate comfort level or baseline comfort level:   Administer analgesics based on type and severity of pain and evaluate response   Encourage patient to monitor pain and request assistance   Assess pain using appropriate pain scale   Implement non-pharmacological measures as appropriate and evaluate response     Problem: ABCDS Injury Assessment  Goal: Absence of physical injury  Outcome: Progressing  Flowsheets (Taken 7/29/2024 2148)  Absence of Physical Injury: Implement safety measures based on patient assessment     Problem: Safety - Adult  Goal: Free from fall injury  Outcome: Progressing  Flowsheets (Taken 7/29/2024 2148)  Free From Fall Injury:   Based on caregiver fall risk screen, instruct family/caregiver to ask for assistance with transferring infant if caregiver noted to have fall risk factors   Instruct family/caregiver on patient safety     Problem: Skin/Tissue Integrity - Adult  Goal: Incisions, wounds, or drain sites healing without S/S of infection  Outcome: Progressing  Flowsheets (Taken 7/29/2024 2148)  Incisions, Wounds, or Drain Sites Healing Without Sign and 
  Problem: Discharge Planning  Goal: Discharge to home or other facility with appropriate resources  7/30/2024 2141 by Rosa Maria Chilel, RN  Outcome: Progressing  Flowsheets (Taken 7/30/2024 2141)  Discharge to home or other facility with appropriate resources:   Identify discharge learning needs (meds, wound care, etc)   Refer to discharge planning if patient needs post-hospital services based on physician order or complex needs related to functional status, cognitive ability or social support system   Arrange for needed discharge resources and transportation as appropriate   Identify barriers to discharge with patient and caregiver     Problem: Pain  Goal: Verbalizes/displays adequate comfort level or baseline comfort level  7/30/2024 2141 by Rosa Maria Chilel, RN  Outcome: Progressing  Flowsheets (Taken 7/30/2024 2141)  Verbalizes/displays adequate comfort level or baseline comfort level:   Encourage patient to monitor pain and request assistance   Administer analgesics based on type and severity of pain and evaluate response   Assess pain using appropriate pain scale   Implement non-pharmacological measures as appropriate and evaluate response     Problem: ABCDS Injury Assessment  Goal: Absence of physical injury  7/30/2024 2141 by Rosa Maria Chilel, RN  Outcome: Progressing  Flowsheets (Taken 7/30/2024 2141)  Absence of Physical Injury: Implement safety measures based on patient assessment     Problem: Safety - Adult  Goal: Free from fall injury  7/30/2024 2141 by Rosa Maria Chilel, RN  Outcome: Progressing  Flowsheets (Taken 7/30/2024 2141)  Free From Fall Injury:   Based on caregiver fall risk screen, instruct family/caregiver to ask for assistance with transferring infant if caregiver noted to have fall risk factors   Instruct family/caregiver on patient safety     Problem: Skin/Tissue Integrity - Adult  Goal: Incisions, wounds, or drain sites healing without S/S of infection  7/30/2024 2141 by Rosa Maria Chilel, 
  Problem: Discharge Planning  Goal: Discharge to home or other facility with appropriate resources  8/1/2024 1145 by Bhavana Ferraro RN  Outcome: Progressing  Flowsheets (Taken 7/31/2024 2210 by Rosa Maria Chilel, RN)  Discharge to home or other facility with appropriate resources:   Identify barriers to discharge with patient and caregiver   Identify discharge learning needs (meds, wound care, etc)   Refer to discharge planning if patient needs post-hospital services based on physician order or complex needs related to functional status, cognitive ability or social support system   Arrange for needed discharge resources and transportation as appropriate  7/31/2024 2210 by Rosa Maria Chilel, RN  Outcome: Progressing  Flowsheets (Taken 7/31/2024 2210)  Discharge to home or other facility with appropriate resources:   Identify barriers to discharge with patient and caregiver   Identify discharge learning needs (meds, wound care, etc)   Refer to discharge planning if patient needs post-hospital services based on physician order or complex needs related to functional status, cognitive ability or social support system   Arrange for needed discharge resources and transportation as appropriate     Problem: Pain  Goal: Verbalizes/displays adequate comfort level or baseline comfort level  8/1/2024 1145 by Bhavana Ferraro RN  Outcome: Progressing  Flowsheets (Taken 7/31/2024 2210 by Rosa Maria Chilel, RN)  Verbalizes/displays adequate comfort level or baseline comfort level:   Encourage patient to monitor pain and request assistance   Administer analgesics based on type and severity of pain and evaluate response   Implement non-pharmacological measures as appropriate and evaluate response   Assess pain using appropriate pain scale  7/31/2024 2210 by Rosa Maria Chilel, RN  Outcome: Progressing  Flowsheets (Taken 7/31/2024 2210)  Verbalizes/displays adequate comfort level or baseline comfort level:   Encourage patient to monitor pain 
  Problem: Discharge Planning  Goal: Discharge to home or other facility with appropriate resources  8/2/2024 1204 by Sturgeon, Cara B, RN  Outcome: Progressing  Flowsheets (Taken 8/2/2024 1204)  Discharge to home or other facility with appropriate resources:   Identify barriers to discharge with patient and caregiver   Arrange for needed discharge resources and transportation as appropriate   Identify discharge learning needs (meds, wound care, etc)   Refer to discharge planning if patient needs post-hospital services based on physician order or complex needs related to functional status, cognitive ability or social support system     Problem: Pain  Goal: Verbalizes/displays adequate comfort level or baseline comfort level  8/2/2024 1204 by Sturgeon, Cara B, RN  Outcome: Progressing  Flowsheets (Taken 8/2/2024 1204)  Verbalizes/displays adequate comfort level or baseline comfort level:   Assess pain using appropriate pain scale   Encourage patient to monitor pain and request assistance   Administer analgesics based on type and severity of pain and evaluate response   Implement non-pharmacological measures as appropriate and evaluate response     Problem: ABCDS Injury Assessment  Goal: Absence of physical injury  8/2/2024 1204 by Sturgeon, Cara B, RN  Outcome: Progressing  Flowsheets (Taken 8/2/2024 1204)  Absence of Physical Injury: Implement safety measures based on patient assessment     Problem: Safety - Adult  Goal: Free from fall injury  8/2/2024 1204 by Sturgeon, Cara B, RN  Outcome: Progressing  Flowsheets (Taken 8/2/2024 1204)  Free From Fall Injury: Instruct family/caregiver on patient safety     Problem: Skin/Tissue Integrity - Adult  Goal: Incisions, wounds, or drain sites healing without S/S of infection  8/2/2024 1204 by Sturgeon, Cara B, RN  Outcome: Progressing  Flowsheets (Taken 8/2/2024 1204)  Incisions, Wounds, or Drain Sites Healing Without Sign and Symptoms of Infection: TWICE DAILY: Assess and 
  Problem: Discharge Planning  Goal: Discharge to home or other facility with appropriate resources  8/3/2024 2112 by Debbie Schofield, RN  Outcome: Progressing  Flowsheets (Taken 8/3/2024 1946)  Discharge to home or other facility with appropriate resources:   Identify barriers to discharge with patient and caregiver   Arrange for needed discharge resources and transportation as appropriate   Identify discharge learning needs (meds, wound care, etc)   Refer to discharge planning if patient needs post-hospital services based on physician order or complex needs related to functional status, cognitive ability or social support system  8/3/2024 1510 by Marci De Los Santos RN  Outcome: Progressing  Flowsheets (Taken 8/3/2024 1510)  Discharge to home or other facility with appropriate resources: Identify barriers to discharge with patient and caregiver     Problem: Pain  Goal: Verbalizes/displays adequate comfort level or baseline comfort level  8/3/2024 2112 by Debbie Schofield RN  Outcome: Progressing  Flowsheets (Taken 8/3/2024 1510 by Marci De Los Santos RN)  Verbalizes/displays adequate comfort level or baseline comfort level:   Administer analgesics based on type and severity of pain and evaluate response   Encourage patient to monitor pain and request assistance   Consider cultural and social influences on pain and pain management   Assess pain using appropriate pain scale   Implement non-pharmacological measures as appropriate and evaluate response  8/3/2024 1510 by Marci De Los Santos RN  Outcome: Progressing  Flowsheets (Taken 8/3/2024 1510)  Verbalizes/displays adequate comfort level or baseline comfort level:   Administer analgesics based on type and severity of pain and evaluate response   Encourage patient to monitor pain and request assistance   Consider cultural and social influences on pain and pain management   Assess pain using appropriate pain scale   Implement non-pharmacological measures as 
  Problem: Discharge Planning  Goal: Discharge to home or other facility with appropriate resources  8/5/2024 0935 by Sturgeon, Cara B, RN  Outcome: Progressing  Flowsheets (Taken 8/5/2024 0935)  Discharge to home or other facility with appropriate resources:   Identify barriers to discharge with patient and caregiver   Arrange for needed discharge resources and transportation as appropriate   Identify discharge learning needs (meds, wound care, etc)     Problem: Pain  Goal: Verbalizes/displays adequate comfort level or baseline comfort level  8/5/2024 0935 by Sturgeon, Cara B, RN  Outcome: Progressing  Flowsheets (Taken 8/5/2024 0935)  Verbalizes/displays adequate comfort level or baseline comfort level:   Assess pain using appropriate pain scale   Encourage patient to monitor pain and request assistance   Administer analgesics based on type and severity of pain and evaluate response   Implement non-pharmacological measures as appropriate and evaluate response     Problem: ABCDS Injury Assessment  Goal: Absence of physical injury  8/5/2024 0935 by Sturgeon, Cara B, RN  Outcome: Progressing  Flowsheets (Taken 8/5/2024 0935)  Absence of Physical Injury: Implement safety measures based on patient assessment     Problem: Safety - Adult  Goal: Free from fall injury  8/5/2024 0935 by Sturgeon, Cara B, RN  Outcome: Progressing  Flowsheets (Taken 8/5/2024 0935)  Free From Fall Injury:   Instruct family/caregiver on patient safety   Based on caregiver fall risk screen, instruct family/caregiver to ask for assistance with transferring infant if caregiver noted to have fall risk factors     Problem: Skin/Tissue Integrity - Adult  Goal: Incisions, wounds, or drain sites healing without S/S of infection  8/5/2024 0935 by Sturgeon, Cara B, RN  Outcome: Progressing  Flowsheets (Taken 8/5/2024 0935)  Incisions, Wounds, or Drain Sites Healing Without Sign and Symptoms of Infection: TWICE DAILY: Assess and document 
  Problem: Discharge Planning  Goal: Discharge to home or other facility with appropriate resources  Outcome: Progressing  Flowsheets (Taken 7/29/2024 1644)  Discharge to home or other facility with appropriate resources: Identify barriers to discharge with patient and caregiver     Problem: Pain  Goal: Verbalizes/displays adequate comfort level or baseline comfort level  Outcome: Progressing  Flowsheets (Taken 7/29/2024 1644)  Verbalizes/displays adequate comfort level or baseline comfort level:   Encourage patient to monitor pain and request assistance   Assess pain using appropriate pain scale   Administer analgesics based on type and severity of pain and evaluate response   Implement non-pharmacological measures as appropriate and evaluate response     
  Problem: Discharge Planning  Goal: Discharge to home or other facility with appropriate resources  Outcome: Progressing  Flowsheets (Taken 7/30/2024 0715)  Discharge to home or other facility with appropriate resources:   Identify barriers to discharge with patient and caregiver   Arrange for needed discharge resources and transportation as appropriate   Identify discharge learning needs (meds, wound care, etc)   Refer to discharge planning if patient needs post-hospital services based on physician order or complex needs related to functional status, cognitive ability or social support system     Problem: Pain  Goal: Verbalizes/displays adequate comfort level or baseline comfort level  Outcome: Progressing     Problem: ABCDS Injury Assessment  Goal: Absence of physical injury  Outcome: Progressing     Problem: Safety - Adult  Goal: Free from fall injury  Outcome: Progressing     Problem: Skin/Tissue Integrity - Adult  Goal: Incisions, wounds, or drain sites healing without S/S of infection  Outcome: Progressing  Flowsheets (Taken 7/30/2024 0715)  Incisions, Wounds, or Drain Sites Healing Without Sign and Symptoms of Infection: TWICE DAILY: Assess and document skin integrity     Problem: Gastrointestinal - Adult  Goal: Maintains or returns to baseline bowel function  Outcome: Progressing  Flowsheets (Taken 7/30/2024 0715)  Maintains or returns to baseline bowel function:   Assess bowel function   Administer IV fluids as ordered to ensure adequate hydration   Administer ordered medications as needed   Encourage mobilization and activity     Problem: Genitourinary - Adult  Goal: Urinary catheter remains patent  Outcome: Progressing     Problem: Nutrition Deficit:  Goal: Optimize nutritional status  Outcome: Progressing     
  Problem: Discharge Planning  Goal: Discharge to home or other facility with appropriate resources  Outcome: Progressing  Flowsheets (Taken 8/3/2024 1510)  Discharge to home or other facility with appropriate resources: Identify barriers to discharge with patient and caregiver     Problem: Pain  Goal: Verbalizes/displays adequate comfort level or baseline comfort level  Outcome: Progressing  Flowsheets (Taken 8/3/2024 1510)  Verbalizes/displays adequate comfort level or baseline comfort level:   Administer analgesics based on type and severity of pain and evaluate response   Encourage patient to monitor pain and request assistance   Consider cultural and social influences on pain and pain management   Assess pain using appropriate pain scale   Implement non-pharmacological measures as appropriate and evaluate response     Problem: Skin/Tissue Integrity - Adult  Goal: Incisions, wounds, or drain sites healing without S/S of infection  Outcome: Progressing  Flowsheets  Taken 8/3/2024 1510  Incisions, Wounds, or Drain Sites Healing Without Sign and Symptoms of Infection: TWICE DAILY: Assess and document skin integrity  Taken 8/3/2024 0751  Incisions, Wounds, or Drain Sites Healing Without Sign and Symptoms of Infection: TWICE DAILY: Assess and document dressing/incision, wound bed, drain sites and surrounding tissue     Problem: Gastrointestinal - Adult  Goal: Maintains or returns to baseline bowel function  Outcome: Progressing  Flowsheets (Taken 8/3/2024 1510)  Maintains or returns to baseline bowel function:   Assess bowel function   Encourage oral fluids to ensure adequate hydration   Administer ordered medications as needed     Problem: Nutrition Deficit:  Goal: Optimize nutritional status  Outcome: Progressing  Flowsheets (Taken 8/3/2024 1510)  Nutrient intake appropriate for improving, restoring, or maintaining nutritional needs: Assess nutritional status and recommend course of action     
  Problem: Discharge Planning  Goal: Discharge to home or other facility with appropriate resources  Outcome: Progressing  Flowsheets (Taken 8/5/2024 0056)  Discharge to home or other facility with appropriate resources:   Identify barriers to discharge with patient and caregiver   Identify discharge learning needs (meds, wound care, etc)   Arrange for needed discharge resources and transportation as appropriate     Problem: Pain  Goal: Verbalizes/displays adequate comfort level or baseline comfort level  Outcome: Progressing  Flowsheets (Taken 8/5/2024 0056)  Verbalizes/displays adequate comfort level or baseline comfort level:   Encourage patient to monitor pain and request assistance   Administer analgesics based on type and severity of pain and evaluate response   Assess pain using appropriate pain scale   Implement non-pharmacological measures as appropriate and evaluate response     Problem: ABCDS Injury Assessment  Goal: Absence of physical injury  Outcome: Progressing  Flowsheets (Taken 8/5/2024 0056)  Absence of Physical Injury: Implement safety measures based on patient assessment     Problem: Safety - Adult  Goal: Free from fall injury  Outcome: Progressing  Flowsheets (Taken 8/5/2024 0056)  Free From Fall Injury: Instruct family/caregiver on patient safety     Problem: Skin/Tissue Integrity - Adult  Goal: Incisions, wounds, or drain sites healing without S/S of infection  Outcome: Progressing  Flowsheets (Taken 8/5/2024 0056)  Incisions, Wounds, or Drain Sites Healing Without Sign and Symptoms of Infection:   ADMISSION and DAILY: Assess and document risk factors for pressure ulcer development   TWICE DAILY: Assess and document skin integrity   TWICE DAILY: Assess and document dressing/incision, wound bed, drain sites and surrounding tissue     Problem: Gastrointestinal - Adult  Goal: Maintains or returns to baseline bowel function  Outcome: Progressing  Flowsheets (Taken 8/5/2024 0056)  Maintains or 
minimum:  Change oxygen saturation probe site  4.  Every 4-6 hours:  If on nasal continuous positive airway pressure, respiratory therapy assess nares and determine need for appliance change or resting period.  7/31/2024 1056 by Justina Powers, RN  Outcome: Progressing       
2210)  Nutrient intake appropriate for improving, restoring, or maintaining nutritional needs:   Assess nutritional status and recommend course of action   Monitor oral intake, labs, and treatment plans  7/31/2024 1103 by Leandra Hatch, [place], LD  Outcome: Not Progressing  Flowsheets (Taken 7/31/2024 1103)  Nutrient intake appropriate for improving, restoring, or maintaining nutritional needs:   Assess nutritional status and recommend course of action   Monitor oral intake, labs, and treatment plans   Recommend, monitor, and adjust tube feedings and TPN/PPN based on assessed needs  7/31/2024 1056 by Justina Powers, RN  Outcome: Progressing   Care plan reviewed with patient. Patient verbalized understanding of plan of care and contributes to goal setting.   
of redness and/or skin breakdown  2.  Assess vascular access sites hourly  3.  Every 4-6 hours minimum:  Change oxygen saturation probe site  4.  Every 4-6 hours:  If on nasal continuous positive airway pressure, respiratory therapy assess nares and determine need for appliance change or resting period.  8/2/2024 2143 by Debbie Schofield, RN  Outcome: Progressing  8/2/2024 1204 by Sturgeon, Cara B, RN  Outcome: Progressing   Care plan reviewed with patient stating understanding of plan of care.  
(Taken 8/1/2024 1037)  Nutrient intake appropriate for improving, restoring, or maintaining nutritional needs:   Assess nutritional status and recommend course of action   Monitor oral intake, labs, and treatment plans   Recommend, monitor, and adjust tube feedings and TPN/PPN based on assessed needs     Care plan reviewed with patient stating understanding of plan of care.

## 2024-08-05 NOTE — DISCHARGE INSTRUCTIONS
DR. LOCKHART DISCHARGE INSTRUCTIONS    Pt Name: Noe Moctezuma  Medical Record Number: [id number]  Today's Date: 8/5/2024    GENERAL ANESTHESIA OR SEDATION  1. Do not drive or operate hazardous machinery for 24 hours.  2. Do not make important business or personal decisions for 24 hours.  3. Do not drink alcoholic beverages or use tobacco for 24 hours.    ACTIVITY INSTRUCTIONS:  Ambulate 4 times a day for approximately 10-15  minutes each time     You may resume normal activity tomorrow. Do not engage in strenuous activity that may place stress on your incision.    You may drive when no longer taking pain medication and you are able to comfortably use the gas/brake pedal. Do not drive long distance, in town driving recommended    avoid heavy lifting, tugging, pullings greater than 10-15 lbs for 6 weeks postoperatively, or until released by Physician/CNP      DIET INSTRUCTIONS:  Normal at home diet    MEDICATIONS  You may resume your daily prescription medication schedule unless otherwise specified.      Pain medication at discharge - use only as prescribed- refills may be available to you at your follow up appointments if needed and warranted.  Narcotics should be used for only short term and we highly encouraged our patients to wean off appropriately and use other means for pain such as non pharmacologic measures and over the counter tylenol or ibuprofen if no restrictions apply. We do  know that surgical pain is real and will not hesitate to help eliminate some of your discomfort. However we will not be able to completely make you pain free and it is important to determine what pain level is tolerable for you     Narcotics cause constipation and we recommend taking a colace daily and Miralax if needed to help reduce the risk of constipation    Increasing water intake if no restrictions will also help eliminate constipation    Ambulation 4 times a day 15 minute each time will help reduce pain each day and help

## 2024-08-05 NOTE — CARE COORDINATION
8/5/24, 11:01 AM EDT    Patient goals/plan/ treatment preferences discussed by  and .  Patient goals/plan/ treatment preferences reviewed with patient/ family.  Patient/ family verbalize understanding of discharge plan and are in agreement with goal/plan/treatment preferences.  Understanding was demonstrated using the teach back method.  AVS provided by RN at time of discharge, which includes all necessary medical information pertaining to the patients current course of illness, treatment, post-discharge goals of care, and treatment preferences.     Services At/After Discharge: None    Discharge order in place. Plans return home with spouse.

## 2024-08-06 ENCOUNTER — CARE COORDINATION (OUTPATIENT)
Dept: CASE MANAGEMENT | Age: 49
End: 2024-08-06

## 2024-08-06 DIAGNOSIS — Z98.890 S/P COLOSTOMY TAKEDOWN: Primary | ICD-10-CM

## 2024-08-06 NOTE — DISCHARGE SUMMARY
Oral  Oral Oral   SpO2: 97%  98% 98%   Weight:       Height:         Weight: Weight - Scale: 99.1 kg (218 lb 8 oz)     24 hour intake/output:No intake or output data in the 24 hours ending 08/06/24 1310      Significant Diagnostics:   Radiology: XR ABDOMEN FOR NG/OG/NE TUBE PLACEMENT    Result Date: 7/29/2024  PROCEDURE: XR ABDOMEN FOR NG/OG/NE TUBE PLACEMENT CLINICAL INFORMATION: ng placement COMPARISON: 3/11/2024 TECHNIQUE: AP supine abdomen performed.     1. Limited evaluation of the abdomen. 2. The tip of the nasogastric tube is seen overlying the left upper quadrant below the diaphragm. 3. Prior cholecystectomy. **This report has been created using voice recognition software.  It may contain minor errors which are inherent in voice recognition technology.** Electronically [facility] Dr[facility]      Labs:   Recent Results (from the past 72 hour(s))   POCT glucose    Collection Time: 08/03/24  4:53 PM   Result Value Ref Range    POC Glucose 83 70 - 108 mg/dl   POCT glucose    Collection Time: 08/03/24 11:57 PM   Result Value Ref Range    POC Glucose 98 70 - 108 mg/dl   POCT glucose    Collection Time: 08/04/24  5:40 AM   Result Value Ref Range    POC Glucose 82 70 - 108 mg/dl   POCT glucose    Collection Time: 08/04/24 11:07 AM   Result Value Ref Range    POC Glucose 88 70 - 108 mg/dl   POCT glucose    Collection Time: 08/04/24  5:20 PM   Result Value Ref Range    POC Glucose 108 70 - 108 mg/dl   POCT glucose    Collection Time: 08/04/24 11:27 PM   Result Value Ref Range    POC Glucose 94 70 - 108 mg/dl   POCT glucose    Collection Time: 08/05/24  6:11 AM   Result Value Ref Range    POC Glucose 107 70 - 108 mg/dl   Basic Metabolic Panel    Collection Time: 08/05/24  7:28 AM   Result Value Ref Range    Sodium 139 135 - 145 meq/L    Potassium 4.4 3.5 - 5.2 meq/L    Chloride 102 98 - 111 meq/L    CO2 28 23 - 33 meq/L    Glucose 101 70 - 108 mg/dL    BUN 14 7 - 22 mg/dL    Creatinine 0.8 0.4 - 1.2 mg/dL

## 2024-08-06 NOTE — PROGRESS NOTES
[facility]  General Surgery - Dr Morgan Vidal   Covering for Dr. Caroline Covington  Postoperative Progress Note    Pt Name: Noe Moctezuma  Medical Record Number: [id number]  Date of Birth 1975   Today's Date: 8/4/2024  ASSESSMENT   POD # 7 S/p takedown of colostomy   Lower abdominal wall incisional cellulitis  Right sided abdominal wall fluid collection  Leukocytosis resolved   Wick drain at old stoma site removed  Postoperative blood loss anemia stable   Mild malnutrition   has a past medical history of Allergic rhinitis, Arthritis, Cancer (HCC), Colitis, Diverticulitis, and Hyperlipidemia.  PLANS   Pain control as needed  Ok to advance to soft diet. Diet education given. Only soft foods such as mashed potato consistency. 1/2 rate PPN.   Monitor bowel function. Had 7 BMs over the last 24 hours. Expect to be loose.   Monitor cellulitis to lower abdominal incision. Jairon with skin marker today. Right abdominal will with what seems to be fluid collection. May try and aspirate tomorrow on rounds if no improvement.   Up ad niko  SCD and Lovenox for DVT prophylaxis  Continue IV antibiotics  Wound & LEIGHA drain care  Hopefully home this week  SUBJECTIVE   Stable overnight.  Chart reviewed.  Updated by night nursing staff.  Afebrile.  Vital signs stable.  Has had multiple bowel movements. States mnimal cramping. No significant bloating. Takes it easy with full liquids.  He denies nausea or vomiting. His pain is controlled on current medications. He has been ambulating in the halls. Incision is approximated, wick drain already removed from previous colostomy site. Midline incision intact. Lower aspect with some erythema but has not worsened. Right abdominal wall swelling with what seems to be fluid.  CURRENT MEDICATIONS   Scheduled Meds:   lisinopril  10 mg Oral Daily    insulin lispro  0-8 Units SubCUTAneous Q6H    pantoprazole (PROTONIX) 40 mg in sodium chloride (PF) 0.9 % 10 mL injection  40 mg IntraVENous 
    [facility]  General Surgery - Dr Morgan Vidal   Covering for Dr. Caroline Covington  Postoperative Progress Note    Pt Name: Noe Moctezuma  Medical Record Number: [id number]  Date of Birth 1975   Today's Date: 8/5/2024  ASSESSMENT   POD # 8 S/p takedown of colostomy   Lower abdominal wall incisional cellulitis  Right sided abdominal wall fluid collection noted to be hematoma, aspirated at bedside, 30ml   old stoma site drainage is sanguinous   Postoperative blood loss anemia stable   Repeat HGB prior to discharge   Mild malnutrition   has a past medical history of Allergic rhinitis, Arthritis, Cancer (HCC), Colitis, Diverticulitis, and Hyperlipidemia.  PLANS   Pain control as needed  soft diet as home PPN.   Monitor bowel function. Had 7 BMs over the last 24 hours. Expect to be loose.   Monitor cellulitis to lower abdominal incision. Jairon with skin marker today. Right abdominal will with what seems to be fluid collection. aspirate today at bedside   Up ad niko  SCD and Lovenox for DVT prophylaxis  Continue IV antibiotics, switch to oral at home   Wound & LEIGHA drain care remove prior to discharge   Planning discharge home today   SUBJECTIVE   Stable overnight.  Chart reviewed.  Updated by night nursing staff.  Afebrile.  Vital signs stable.  Has had multiple bowel movements. States minimal cramping. No significant bloating. Tolerates soft diet He denies nausea or vomiting. His pain is controlled on current medications. He has been ambulating in the halls. Incision is approximated, wick drain already removed from previous colostomy site. Midline incision intact. Lower aspect with some erythema,, aspirate at bedside. Hematoma. 30ml withdrawn  Right abdominal wall swelling improved with aspiration   CURRENT MEDICATIONS   Scheduled Meds:   lisinopril  10 mg Oral Daily    insulin lispro  0-8 Units SubCUTAneous Q6H    pantoprazole (PROTONIX) 40 mg in sodium chloride (PF) 0.9 % 10 mL injection  40 mg 
    Pharmacy Consult for TPN/PPN Initiation    Indication for TPN: NPO  Ordering Provider: Madai Vitale          IV Access: peripheral  Dosing weight: 86 kg  Current insulin regimen: medium sliding scale  Maintenance fluid:  D5 and 0.45 with 20 mEq KCL at 100 ml/hr    Assessment/ Plan:  Begin 3-in-1 TPN today with the ingredients listed below:    Date 7/30/2024   Total kcal/kg 10   Total kcal 860   Protein g/kg 1   Protein g 86   Protein kcal (4 kcal/g) 344   Lipid % 20   Lipid g 17.2   Lipid kcal (10 kcal/g) 172   Dextrose g 101.2   Dextrose kcal (3.4 kcal/g) 344   Infusion Rate (mL/hr) 100   Na Acetate (mEq) 80   Na Chloride (mEq) 160   Na Phos (mmol)  (3 mmol = 4 mEq Na) 0   Total Na (mEq) 240   K Acetate (mEq) 20   K Chloride (mEq) 0   K Phos (mmol)  (15 mmol = 22 mEq K) 15   Total K (mEq) 42   Ca Gluconate (mEq)  (1 g = 4.65 mEq) 0   Mag Sulfate (mEq)  (1 g = 8.12 mEq) 8.12   Multivitamin (mL) 10   Trace Elements (mL) 1     Electrolyte Replacement:   Sodium phosphate: 15 mmol + potassium replacement in maintenance fluids    Monitoring:  BMP, Mag, iCal, & Phos ordered for tomorrow with AM labs.    Pharmacy will continue to follow daily. Thank you for the consult.    Nichole Jean, PharmD, BCPS  7/30/2024  11:53 AM      
    Pharmacy Consult for TPN/PPN Monitoring & Adjustment  IV Access: peripheral      Dosing weight: 86 kg  Current insulin regimen: medium sliding scale  Diet (excluding TPN):  Dysphagia diet-soft and bite sized  Maintenance fluid: none    Plan:  See components of tonight's TPN bag in the table below:  Date 7/30/2024 7/31/2024 8/1/2024 8/2/2024 8/3/2024 8/4/2024   Total kcal/kg 10 10 10 10 10 10   Total kcal 860 860 860 860 860 860   Protein g/kg 1 1 1 1 1 1   Protein g 86 86 86 86 86 86   Protein kcal (4 kcal/g) 344 344 344 344 344 344   Lipid % 20 20 20 20 20 20   Lipid g 17.2 17.2 17.2 17.2 17.2 17.2   Lipid kcal (10 kcal/g) 172 172 172 172 172 172   Dextrose g 101.2 101.2 101.2 101.2 101.2 101.2   Dextrose kcal (3.4 kcal/g) 344 344 344 344 344 344   Infusion Rate (mL/hr) 100 100 100 100 100 Run at 50 ml/hr   Na Acetate (mEq) 80 80 80 80 80 80   Na Chloride (mEq) 160 160 170 170 170 170   Na Phos (mmol)  (3 mmol = 4 mEq Na) 0 0 0 0 0 0   Total Na (mEq) 240 240 250 250 250 250   K Acetate (mEq) 20 20 20 0 0 0   K Chloride (mEq) 0 0 0 0 0 0   K Phos (mmol)  (15 mmol = 22 mEq K) 15 21 24 24 21 21   Total K (mEq) 42 50.8 55.2 35.2 30.8 30.8   Ca Gluconate (mEq)  (1 g = 4.65 mEq) 0 0 4.65 4.65 4.65 4.65   Mag Sulfate (mEq)  (1 g = 8.12 mEq) 8.12 0 0 0 0 0   Multivitamin (mL) 10 10 10 10 10 10   Trace Elements (mL) 1 1 1 1 1 1         Summary of changes for tonight's TPN: run at half rate    Electrolyte Replacement:   none    Monitoring:  BMP, Mag, iCal, & Phos ordered for tomorrow with AM labs.    Pharmacy will continue to follow daily. Thank you for the consult.  Cely NyD, BCPS 8/4/2024 10:21 AM      
    Pharmacy Consult for TPN/PPN Monitoring & Adjustment  IV Access: peripheral      Dosing weight: 86 kg  Current insulin regimen: medium sliding scale  Diet (excluding TPN): Clear liquid diet  Maintenance fluid: none    Plan:  See components of tonight's TPN bag in the table below:    Date 7/30/2024 7/31/2024 8/1/2024 8/2/2024 8/3/2024   Total kcal/kg 10 10 10 10 10   Total kcal 860 860 860 860 860   Protein g/kg 1 1 1 1 1   Protein g 86 86 86 86 86   Protein kcal (4 kcal/g) 344 344 344 344 344   Lipid % 20 20 20 20 20   Lipid g 17.2 17.2 17.2 17.2 17.2   Lipid kcal (10 kcal/g) 172 172 172 172 172   Dextrose g 101.2 101.2 101.2 101.2 101.2   Dextrose kcal (3.4 kcal/g) 344 344 344 344 344   Infusion Rate (mL/hr) 100 100 100 100 100   Na Acetate (mEq) 80 80 80 80 80   Na Chloride (mEq) 160 160 170 170 170   Na Phos (mmol)  (3 mmol = 4 mEq Na) 0 0 0 0 0   Total Na (mEq) 240 240 250 250 250   K Acetate (mEq) 20 20 20 0 0   K Chloride (mEq) 0 0 0 0 0   K Phos (mmol)  (15 mmol = 22 mEq K) 15 21 24 24 21   Total K (mEq) 42 50.8 55.2 35.2 30.8   Ca Gluconate (mEq)  (1 g = 4.65 mEq) 0 0 4.65 4.65 4.65   Mag Sulfate (mEq)  (1 g = 8.12 mEq) 8.12 0 0 0 0   Multivitamin (mL) 10 10 10 10 10   Trace Elements (mL) 1 1 1 1 1       Summary of changes  for tonight's TPN:   Reduce Kphos to 21 mmol    Electrolyte Replacement: none    Monitoring:    BMP, Mag, iCal, & Phos ordered for tomorrow with AM labs.    Pharmacy will continue to follow daily. Thank you for the consult.  Logan Hameed [facility] 8/3/2024 8:51 AM      
    Pharmacy Consult for TPN/PPN Monitoring & Adjustment  IV Access: peripheral      Dosing weight: 86 kg  Current insulin regimen: medium sliding scale  Diet (excluding TPN): NPO  Maintenance fluid:  Received a 500 mL NS bolus    Plan:  See components of tonight's TPN bag in the table below:     Date 7/30/2024 7/31/2024   Total kcal/kg 10 10   Total kcal 860 860   Protein g/kg 1 1   Protein g 86 86   Protein kcal (4 kcal/g) 344 344   Lipid % 20 20   Lipid g 17.2 17.2   Lipid kcal (10 kcal/g) 172 172   Dextrose g 101.2 101.2   Dextrose kcal (3.4 kcal/g) 344 344   Infusion Rate (mL/hr) 100 100   Na Acetate (mEq) 80 80   Na Chloride (mEq) 160 160   Na Phos (mmol)  (3 mmol = 4 mEq Na) 0 0   Total Na (mEq) 240 240   K Acetate (mEq) 20 20   K Chloride (mEq) 0 0   K Phos (mmol)  (15 mmol = 22 mEq K) 15 21   Total K (mEq) 42 50.8   Ca Gluconate (mEq)  (1 g = 4.65 mEq) 0 0   Mag Sulfate (mEq)  (1 g = 8.12 mEq) 8.12 0   Multivitamin (mL) 10 10   Trace Elements (mL) 1 1     Summary of changes for tonight's TPN:   -Remove magnesium  -Increase potassium phosphate to 21    Electrolyte Replacement:   Sodium phosphate: 15 mmol    Monitoring:  BMP, Mag, iCal, & Phos ordered for tomorrow with AM labs.    Pharmacy will continue to follow daily. Thank you for the consult.  Yudith Harrell PharmD 7/31/2024 10:50 AM    
    Pharmacy Consult for TPN/PPN Monitoring & Adjustment  IV Access: peripheral      Dosing weight: 86 kg  Current insulin regimen: medium sliding scale  Diet (excluding TPN): NPO  Maintenance fluid: none    Plan:  See components of tonight's TPN bag in the table below:    Date 7/30/2024 7/31/2024 8/1/2024   Total kcal/kg 10 10 10   Total kcal 860 860 860   Protein g/kg 1 1 1   Protein g 86 86 86   Protein kcal (4 kcal/g) 344 344 344   Lipid % 20 20 20   Lipid g 17.2 17.2 17.2   Lipid kcal (10 kcal/g) 172 172 172   Dextrose g 101.2 101.2 101.2   Dextrose kcal (3.4 kcal/g) 344 344 344   Infusion Rate (mL/hr) 100 100 100   Na Acetate (mEq) 80 80 80   Na Chloride (mEq) 160 160 170   Na Phos (mmol)  (3 mmol = 4 mEq Na) 0 0 0   Total Na (mEq) 240 240 250   K Acetate (mEq) 20 20 20   K Chloride (mEq) 0 0 0   K Phos (mmol)  (15 mmol = 22 mEq K) 15 21 24   Total K (mEq) 42 50.8 55.2   Ca Gluconate (mEq)  (1 g = 4.65 mEq) 0 0 4.65   Mag Sulfate (mEq)  (1 g = 8.12 mEq) 8.12 0 0   Multivitamin (mL) 10 10 10   Trace Elements (mL) 1 1 1     Summary of changes for tonight's TPN:   -increase sodium chloride to 170 mEq  -increase potassium phosphate to 24 mmol  -increase calcium gluconate to 4.65 mEq    Electrolyte Replacement:   Sodium phosphate 15 mmol    Monitoring:  BMP, Mag, iCal, & Phos ordered for tomorrow with AM labs.    Pharmacy will continue to follow daily. Thank you for the consult.    Yudith Harrell PharmD 8/1/2024 10:51 AM    
    Pharmacy Consult for TPN/PPN Monitoring & Adjustment  IV Access: peripheral      Dosing weight: 86 kg  Current insulin regimen: medium sliding scale  Diet (excluding TPN): NPO  Maintenance fluid: none    Plan:  See components of tonight's TPN bag in the table below:  Date 7/30/2024 7/31/2024 8/1/2024 8/2/2024   Total kcal/kg 10 10 10 10   Total kcal 860 860 860 860   Protein g/kg 1 1 1 1   Protein g 86 86 86 86   Protein kcal (4 kcal/g) 344 344 344 344   Lipid % 20 20 20 20   Lipid g 17.2 17.2 17.2 17.2   Lipid kcal (10 kcal/g) 172 172 172 172   Dextrose g 101.2 101.2 101.2 101.2   Dextrose kcal (3.4 kcal/g) 344 344 344 344   Infusion Rate (mL/hr) 100 100 100 100   Na Acetate (mEq) 80 80 80 80   Na Chloride (mEq) 160 160 170 170   Na Phos (mmol)  (3 mmol = 4 mEq Na) 0 0 0 0   Total Na (mEq) 240 240 250 250   K Acetate (mEq) 20 20 20 0   K Chloride (mEq) 0 0 0 0   K Phos (mmol)  (15 mmol = 22 mEq K) 15 21 24 24   Total K (mEq) 42 50.8 55.2 35.2   Ca Gluconate (mEq)  (1 g = 4.65 mEq) 0 0 4.65 4.65   Mag Sulfate (mEq)  (1 g = 8.12 mEq) 8.12 0 0 0   Multivitamin (mL) 10 10 10 10   Trace Elements (mL) 1 1 1 1      Summary of changes for tonight's TPN:   -Remove potassium acetate    Electrolyte Replacement:   None    Monitoring:  BMP, Mag, iCal, & Phos ordered for tomorrow with AM labs.    Pharmacy will continue to follow daily. Thank you for the consult.    Yudith Harrell PharmD 8/2/2024 12:36 PM    
    [facility]  General Surgery - Dr Morgan Vidal   Covering for Dr. Caroline Covington  Postoperative Progress Note    Pt Name: Noe Moctezuma  Medical Record Number: [id number]  Date of Birth 1975   Today's Date: 8/3/2024  ASSESSMENT   POD # 6 S/p takedown of colostomy   Lower abdominal wall incisional cellulitis  Leukocytosis resolved   Wick drain at old stoma site removed  Postoperative blood loss anemia stable   Mild malnutrition    FINAL DIAGNOSIS:   Colon, anastomotic rings, excision:     Focal active colitis, please see microscopic    Negative for malignancy      has a past medical history of Allergic rhinitis, Arthritis, Cancer (HCC), Colitis, Diverticulitis, and Hyperlipidemia.  PLANS   Analgesics and antiemetics as needed  IV hydration dc'd once PPN started   Bowel function returned.  Clear liquids and advance to full liquids later today if doing well   PPN half rate tomorrow and wean to off if continuing to progress well over the next 24 hours  Increase activity ambulate, C&DB, IS, up to chair  SCD and Lovenox for DVT prophylaxis  Monitor labs   GI prophylaxis   IV antibiotics zosyn - superficial wound cellulitis  Urinating spontaneously  Discharge planning pending progress  SUBJECTIVE   Stable overnight.  Chart reviewed.  Updated by night nursing staff.  Afebrile.  Vital signs stable.  Has had multiple bowel movements.  He denies pain.   He denies nausea or vomiting. He is tolerating a clear liquids.  His pain is controlled on current medications. He has been ambulating in the halls. Incision is approximated, wick drain already removed from previous colostomy site.   CURRENT MEDICATIONS   Scheduled Meds:   lisinopril  10 mg Oral Daily    insulin lispro  0-8 Units SubCUTAneous Q6H    pantoprazole (PROTONIX) 40 mg in sodium chloride (PF) 0.9 % 10 mL injection  40 mg IntraVENous Daily    sodium chloride flush  5-40 mL IntraVENous 2 times per day    enoxaparin  40 mg SubCUTAneous Daily    
  Physician Progress Note      PATIENT:               BULMARO RHODES  CSN #:                  [id number]  :                       1975  ADMIT DATE:       2024 5:42 AM  DISCH DATE:        2024 1:19 PM  RESPONDING  PROVIDER #:        GERALD Ndiaye CNP          QUERY TEXT:    Pt admitted for colostomy takedown and underwent colostomy reversal, noted   documentation of lower abdominal wall incisional cellulitis starting 8/3.? If   possible, please document in progress notes and discharge summary the   relationship if any between the abdominal cellulitis and the surgery:  ?  The medical record reflects the following:  Risk Factors: colostomy reversal  Clinical Indicators: presented for colostomy reversal with note from 8/3 and    reflecting abdominal wall incisional cellulitis and low grade fever of   99.1  Treatment: Labs, imaging, monitoring, Zosyn, IVF  Options provided:  -- Lower abdominal wall incisional cellulitis is due to the procedure  -- Lower abdominal wall incisional cellulitis is not due to the procedure, but   is due to other incidental risk factor, Please specify other incidental risk   factor.  -- Other - I will add my own diagnosis  -- Disagree - Not applicable / Not valid  -- Disagree - Clinically unable to determine / Unknown  -- Refer to Clinical Documentation Reviewer    PROVIDER RESPONSE TEXT:    Patient has lower abdominal wall incisional cellulitis due to the procedure.    Query created by: Martha Jean on 2024 9:05 AM      Electronically signed by:  GERALD Ndiaye CNP 2024 10:24 AM          
  [facility] Surgery - Madai Vitale, APRN - CNP  On behalf of Dr. Caroline Covington  Postoperative Progress Note  Pt Name: Noe Moctezuma  Medical Record Number: [id number]  Date of Birth 1975   Today's Date: 7/30/2024  ASSESSMENT   POD # 1 S/p  takedown of colostomy   Postoperative Abdominal pain/spasms  Leukocytosis likely related to #1  Wick drain at old stoma site   Postoperative blood loss anemia   Mild malnutrition  FINAL DIAGNOSIS:   Colon, anastomotic rings, excision:     Focal active colitis, please see microscopic    Negative for malignancy      has a past medical history of Allergic rhinitis, Arthritis, Cancer (HCC), Colitis, Diverticulitis, and Hyperlipidemia.  PLANS   Analgesics and antiemetics as needed  IV hydration  npo with PPN   Increase activity ambulate, C&DB, IS, up to chair  SCD for DVT prophylaxis, hold lovenox today   Monitor hgb   GI prophylaxis   IV antibiotics   Discharge planning pending progress  SUBJECTIVE   Noe is doing better this am, states the flexiril helped with abdominal spasms. He denies nausea or vomiting, has not passed flatus and had a bowel movement. He is tolerating a Diet NPO  PN-Adult 3 IN 1 Peripheral Line (Custom). His pain is well controlled on current medications. He has been ambulating in the halls.   CURRENT MEDICATIONS   Scheduled Meds:   sodium phosphate IVPB (PERIPHERAL line)  15 mmol IntraVENous Once    [START ON 7/31/2024] insulin lispro  0-8 Units SubCUTAneous Q6H    [START ON 7/31/2024] pantoprazole (PROTONIX) 40 mg in sodium chloride (PF) 0.9 % 10 mL injection  40 mg IntraVENous Daily    sodium chloride flush  5-40 mL IntraVENous 2 times per day    [Held by provider] enoxaparin  40 mg SubCUTAneous Daily    naloxegol  25 mg Oral Daily    piperacillin-tazobactam  3,375 mg IntraVENous Q8H     Continuous Infusions:   dextrose      PN-Adult 3 IN 1 Peripheral Line (Custom)      sodium chloride      dextrose 5% and 0.45% NaCl with KCl 20 mEq 
1054- pt to pacu. Pt denies pain, nausea at this time. VSS. Pt appears in no acute distress.    1104- pt reports increase in pain level. Pt medicated per orders    1115- xray at bedside. NG not in place, ng advanced, remains unmoved. Ng retracted to 45, re advanced to 70, xray completed, NG in place. Hooked to LIWS with gastric return. Continue to medicate for significant pain.    1126- pt continues to report significant pain, VSS    1132- pt continues to report pain, continue to medicate.    1141- pt resting in bed eyes closed. VSS pt reports improved tolerable pain when asked, returns to snoring resp.    1154- report called to  nurse Candida    1158- pt drifting in and out of sleep with snoring resp. Pt meets criteria for discharge from pacu. Transport requested. Call to [facility] to send family to 6A 04    1207- pt wakes to pray, drifts back off to sleep. VSS    1217- transport arrives to take pt to 6A 04      
Comprehensive Nutrition Assessment    Type and Reason for Visit:  Reassess    Nutrition Recommendations/Plan:   Continue  TPN with a dosing weight of 86 kg; 10 kcal/kg, 1 g pro/kg, and 20% kcals from lipid. Phos replacement ordered per pharmacist.   Monitor NPO status, PPN, labs, & wt.      Malnutrition Assessment:  Malnutrition Status:  Mild malnutrition (07/30/24 1024)    Context:  Acute Illness     Findings of the 6 clinical characteristics of malnutrition:  Energy Intake:  No significant decrease in energy intake (his appetite PTA was \"wonderful\")  Weight Loss:  No significant weight loss (has had a recent weight gain)     Body Fat Loss:  Mild body fat loss Orbital   Muscle Mass Loss:  No significant muscle mass loss    Fluid Accumulation:  No significant fluid accumulation     Strength:  Not Performed    Nutrition Assessment:     Pt. Not improving from a nutritional standpoint AEB phos is low, unable to increase base components in TPN due to refeed risk. At risk for further nutrition compromise r/t patient admitted for takedown Colostomy with Incisional Hernia Repair with Phasix Mesh on 7/29, mild malnutrition, focal active colitis, negative for malignancy . Noted underlying medical condition (PMHx arthritis, hx colon cancer with sigmoid colectomy 2/28/24, ex lap with diverting colostomy 3/8/24)     Nutrition Related Findings:    Pt. Report/Treatments/Miscellaneous: Pt seen, denies nausea/vomiting. Has a NGT. RN mentions pt's NGT was clamped for meds & will be to suction. Pt mnetions abdominal spasms improved. 3 in1  PPN infusing 100ml/hr.   GI Status: Last BM was PTA during his bowel prep per pt  Pertinent Labs: (7/31) Phos 2(low)  Pertinent Meds: Insulin Lispro, Movantik     Wound Type: Surgical Incision (Takedown Colostomy with Incisional Hernia Repair with Phasix Mesh on 7/29/24)       Current Nutrition Intake & Therapies:    Average Meal Intake: NPO  Average Supplements Intake: NPO  Diet NPO  PN-Adult 
Comprehensive Nutrition Assessment    Type and Reason for Visit:  Reassess    Nutrition Recommendations/Plan:   Continue PPN  with a dosing weight of 86 kg; 10 kcal/kg, 1 g pro/kg, and 20% kcals from lipid. This will provide 860 kcal, 86 g pro, 101 g dextrose, and 17.2 g lipid in 24 hours. Discussed with pharmacy.   Consider weigh pt as able.  Monitor NPO status, PPN, labs, & wt.      Malnutrition Assessment:  Malnutrition Status:  Mild malnutrition (07/30/24 1024)    Context:  Acute Illness     Findings of the 6 clinical characteristics of malnutrition:  Energy Intake:   no significant decrease in his energy intake ) his appeitte PTA was \"wonderful\")  Weight Loss:      no significant wt loss (has had a recent weight gain)  Body Fat Loss:    mild body fat loss (orbital)    Muscle Mass Loss:     no significant ,muscle mass loss  Fluid Accumulation:     no significant fluid accumulation   Strength:   not performed    Nutrition Assessment:     Pt. Not improving from a nutritional standpoint AEB phos is low, unable to increase base components in PPN due to refeed risk. At risk for further nutrition compromise r/t patient admitted for takedown Colostomy with Incisional Hernia Repair with Phasix Mesh on 7/29, mild malnutrition, focal active colitis, negative for malignancy . Noted underlying medical condition (PMHx arthritis, hx colon cancer with sigmoid colectomy 2/28/24, ex lap with diverting colostomy 3/8/24), cholecystectomy.     Nutrition Related Findings:    Pt. Report/Treatments/Miscellaneous: Pt seen, Has NGT.  There was  550ml NGT OP (7/31)-(8/1). Pt mentions nausea \" comes and goes\" and denies feeling nauseated now. Pt mentions no BM & denies passing gas. PPN infusing 100ml/hr.   GI Status: last BM was PTA during his bowel prep per pt  Pertinent Labs: (8/1) Ca Ionized 1.08,Phos 2.3  Pertinent Meds: Insulin Lispro, Movantik     Wound Type: Surgical Incision (Takedown Colostomy with Incisional Hernia Repair 
Comprehensive Nutrition Assessment    Type and Reason for Visit:  Reassess    Nutrition Recommendations/Plan:   Continue current PPN macronutrients for next bag: dose weight 86kgm, 10 kcal/kgm, 1 gram protein/kgm, 20% lipid kcals - spoke with Pharmacist   Diet as per Surgeon- currently NPO     Malnutrition Assessment:  Malnutrition Status:  Mild malnutrition (08/01/24 1040)    Context:  Acute Illness     Findings of the 6 clinical characteristics of malnutrition:  Energy Intake:  No significant decrease in energy intake (pt reports pTA his appeitte was \"wonderful\")  Weight Loss:  No significant weight loss (( has had a recent weight gain))     Body Fat Loss:  Mild body fat loss Orbital   Muscle Mass Loss:  No significant muscle mass loss    Fluid Accumulation:  No significant fluid accumulation     Strength:  Not Performed    Nutrition Assessment:      Pt. improving from a nutritional standpoint AEB receiving PPN, reports of having first stool this morning since surgery. At risk for further nutrition compromise r/t patient admitted for takedown Colostomy with Incisional Hernia Repair with Phasix Mesh on 7/29, focal active colitis, negative for malignancy, meets criteria for mild malnutrition, PPN initiated 7/30. Note underlying medical condition (PMHx arthritis, hx colon cancer with sigmoid colectomy 2/28/24, ex lap with diverting colostomy 3/8/24), cholecystectomy.     Nutrition Related Findings:    Pt. Report/Treatments/Miscellaneous: Patient seen with NG tube to suction with 750ml output past 24 hours, reports nausea comes and goes, PPN infusing at 100ml/ hour, reports had BM this morning   GI Status: one BM this morning per patient   Pertinent Labs: Sodium 140, Potassium 4.6, BUN 13, Creatinine 0.8, Magnesium 2.1, Phosphorus 3, Glucose 94  Pertinent Meds: movantik, zosyn     Wound Type: Surgical Incision (Takedown Colostomy with Incisional Hernia Repair with Phasix Mesh on 7/29/24)       Current Nutrition 
Comprehensive Nutrition Assessment    Type and Reason for Visit:  Reassess    Nutrition Recommendations/Plan:   TPN discontinued.  Consider GI [place] Low Fiber Low Triglyceride  Diet as appropriate.Triglycerides were 417 on (6/28/24)  Consider triglyceride level as appropriate if not discharged.      Malnutrition Assessment:  Malnutrition Status:  Mild malnutrition (08/01/24 1040)    Context:  Acute Illness     Findings of the 6 clinical characteristics of malnutrition:  Energy Intake:  No significant decrease in energy intake (pt reports pTA his appeitte was \"wonderful\")  Weight Loss:  No significant weight loss (( has had a recent weight gain))     Body Fat Loss:  Mild body fat loss Orbital   Muscle Mass Loss:  No significant muscle mass loss    Fluid Accumulation:  No significant fluid accumulation     Strength:  Not Performed    Nutrition Assessment:     Pt. improving from a nutritional standpoint AEB  PPN discontinued & pt tolerating current diet per pt with po intake ~75% at breakfast. At risk for further nutrition compromise r/t patient admitted for takedown Colostomy with Incisional Hernia Repair with Phasix Mesh on 7/29, focal active colitis, negative for malignancy, meets criteria for mild malnutrition, PPN initiated 7/30. Note underlying medical condition (PMHx arthritis, hx colon cancer with sigmoid colectomy 2/28/24, ex lap with diverting colostomy 3/8/24), cholecystectomy     Nutrition Related Findings:    Pt. Report/Treatments/Miscellaneous: PPN discontinued. Pt seen, mentions he ate everything on his tray except the sausage due to it is a spicy food. He denies chewing/swallowing difficulty & reports good appetite. He denies N/V    GI Status: Pt reports he just had a BM. BM x 1 noted (8/5) in chart.  Pertinent Labs: reviewed  Pertinent Meds:  Insulin Lispro    Wound Type: Surgical Incision (Takedown Colostomy with Incisional Hernia Repair with Phasix Mesh on 7/29/24)       Current Nutrition Intake 
I have independently performed an evaluation on Noe . I have reviewed the   documentation completed by the madai vitale np   I personally saw and examined the patient     Total time spent 20minutes.  Time could have been discontiguous.  Time does not include procedures.  Time does include my direct assessment of the patient and coordination of care.        Resting in bed, pain better today, no flatus. Urine concentrated, kim in place. Wait return of bowel function before advancing diet     Electronically signed by Caroline Covington MD on 7/31/2024 at 10:24 AM    [facility] - Madai Vitale, APRN - CNP  On behalf of Dr. Caroline Covington  Postoperative Progress Note  Pt Name: Noe Moctezuma  Medical Record Number: [id number]  Date of Birth 1975   Today's Date: 7/31/2024  ASSESSMENT   POD # 2 S/p  takedown of colostomy   Postoperative Abdominal pain/spasms  Leukocytosis likely related to #1 trending down  Wick drain at old stoma site   Postoperative blood loss anemia stable   Mild malnutrition  FINAL DIAGNOSIS:   Colon, anastomotic rings, excision:     Focal active colitis, please see microscopic    Negative for malignancy      has a past medical history of Allergic rhinitis, Arthritis, Cancer (HCC), Colitis, Diverticulitis, and Hyperlipidemia.  PLANS   Analgesics and antiemetics as needed  IV hydration dc'd once PPN started   npo with PPN   Increase activity ambulate, C&DB, IS, up to chair  SCD for DVT prophylaxis, restarted lovenox today   Monitor hgb stable   GI prophylaxis   IV antibiotics   Discharge planning pending progress  SUBJECTIVE   Noe is doing better this am, states the flexiril helped with abdominal spasms. He states yesterday was not a good day.  He denies nausea or vomiting, has not passed flatus and had a bowel movement. He is tolerating a Diet NPO  PN-Adult 3 IN 1 Peripheral Line (Custom). His pain is controlled on current medications. He has been ambulating in the 
Patient educated on how to use incentive spirometer. Patient verbalized understanding and demonstrated proper use pt achieved 1750. Emphasized importance and usage of device, with coughing and deep breathing every 2 hours while awake.        
Patient educated on how to use incentive spirometer. Patient verbalized understanding and demonstrated proper use. Emphasized importance and usage of device, with coughing and deep breathing every 2 hours while awake.        
Patient oriented to Same Day department and admitted to Same Day Surgery room 20.   Patient verbalized approval for first name, last initial with physician name on unit whiteboard.     Plan of care reviewed with patient.   Patient room whiteboard filled out and discussed with patient and responsible adult.       Call light in reach.   Bed in lowest position, locked, with one bed rail up.   SCDs and warming blanket in place.  Appropriate arm bands on patient.   Bathroom offered.   All questions and concerns of patient addressed.        Meds to Beds:   Patient informed of [facility] Meds to Beds program during admission. Patient has declined use of program.          
Pt from recovery, n/g to LIWS, Chaim drain emptied 40 ml of blood fluid, call light in reach, bed alarm on, family at the bedside.  
24 1743    dextrose      sodium chloride       PRN Meds:.phenol, glucose, dextrose bolus **OR** dextrose bolus, glucagon (rDNA), dextrose, sodium chloride flush, sodium chloride, ondansetron **OR** ondansetron, potassium chloride **OR** potassium alternative oral replacement **OR** potassium chloride, HYDROmorphone **OR** HYDROmorphone, cyclobenzaprine  OBJECTIVE   CURRENT VITALS:  height is 1.829 m (6') and weight is 99.8 kg (220 lb). His oral temperature is 98 °F (36.7 °C). His blood pressure is 119/82 and his pulse is 71. His respiration is 16 and oxygen saturation is 96%.  Body mass index is 29.84 kg/m².  Temperature Range (24h):Temp: 98 °F (36.7 °C) Temp  Av.9 °F (37.2 °C)  Min: 98 °F (36.7 °C)  Max: 99.6 °F (37.6 °C)  BP Range (24h): Systolic (24hrs), Av , Min:114 , Max:126     Diastolic (24hrs), Av, Min:76, Max:82    Pulse Range (24h): Pulse  Av.5  Min: 71  Max: 78  Respiration Range (24h): Resp  Av.3  Min: 16  Max: 18  Current Pulse Ox (24h):  SpO2: 96 %  Pulse Ox Range (24h):  SpO2  Av.8 %  Min: 96 %  Max: 100 %  Oxygen Amount and Delivery: O2 Flow Rate (L/min): 2 L/min  Incentive Spirometry Tx:       Achieved Volume (mL): 1500 mL    GENERAL: alert, no distress  LUNGS: clear to ausculation, without wheezes, rales or rhonci  HEART: normal rate and regular rhythm  ABDOMEN: soft, incisional tenderness, bowel sounds very active, and no guarding or peritoneal signs  INCISION: no significant erythema, bloody drainage from wick dressing noted, midline incision is intact   EXTREMITY: no cyanosis, clubbing or edema  In: 60 [NG/GT:60]  Out: 3715 [Urine:2900; Drains:65]  Closed/Suction Drain Medial LLQ Bulb-Output (ml): 15 ml  Date 24 0000 - 24 2359   Shift 0148-4003 3174-2520 9536-9436 24 Hour Total   INTAKE   P.O. 0   0   Shift Total(mL/kg) 0(0)   0(0)   OUTPUT   Urine(mL/kg/hr) 1450(1.8)   1450   Emesis/NG output 400   400   Drains 15   15   Shift Total(mL/kg) 
SubCUTAneous Daily    naloxegol  25 mg Oral Daily    piperacillin-tazobactam  3,375 mg IntraVENous Q8H     Continuous Infusions:   PN-Adult 3 IN 1 Peripheral Line (Custom)      PN-Adult 3 IN 1 Peripheral Line (Custom) 100 mL/hr at 24 0412    dextrose      sodium chloride       PRN Meds:.phenol, glucose, dextrose bolus **OR** dextrose bolus, glucagon (rDNA), dextrose, sodium chloride flush, sodium chloride, ondansetron **OR** ondansetron, potassium chloride **OR** potassium alternative oral replacement **OR** potassium chloride, HYDROmorphone **OR** HYDROmorphone, cyclobenzaprine  OBJECTIVE   CURRENT VITALS:  height is 1.829 m (6') and weight is 99.8 kg (220 lb). His oral temperature is 98.7 °F (37.1 °C). His blood pressure is 124/78 and his pulse is 72. His respiration is 18 and oxygen saturation is 97%.  Body mass index is 29.84 kg/m².  Temperature Range (24h):Temp: 98.7 °F (37.1 °C) Temp  Av.7 °F (37.1 °C)  Min: 98.3 °F (36.8 °C)  Max: 99.2 °F (37.3 °C)  BP Range (24h): Systolic (24hrs), Av , Min:101 , Max:129     Diastolic (24hrs), Av, Min:71, Max:88    Pulse Range (24h): Pulse  Av.6  Min: 72  Max: 79  Respiration Range (24h): Resp  Av.5  Min: 16  Max: 18  Current Pulse Ox (24h):  SpO2: 97 %  Pulse Ox Range (24h):  SpO2  Av.6 %  Min: 97 %  Max: 98 %  Oxygen Amount and Delivery: O2 Flow Rate (L/min): 2 L/min  Incentive Spirometry Tx:       Achieved Volume (mL): 1500 mL    GENERAL: alert, no distress  LUNGS: clear to ausculation, without wheezes, rales or rhonci  HEART: normal rate and regular rhythm  ABDOMEN: soft, incisional tenderness, bowel sounds very hypoactive, and no guarding or peritoneal signs  INCISION: no significant erythema, bloody drainage from wick dressing noted, midline incision is intact   EXTREMITY: no cyanosis, clubbing or edema  In: 3160.6 [NG/GT:150]  Out: 3075 [Urine:2350; Drains:75]  Closed/Suction Drain Medial LLQ Bulb-Output (ml): 30 ml  Date 24

## 2024-08-06 NOTE — CARE COORDINATION
Care Transitions Note    Initial Call - Call within 2 business days of discharge: Yes    Patient Current Location:  Home: [address] [place] [address]    Care Transition Nurse contacted the patient by telephone to perform post hospital discharge assessment, verified name and  as identifiers. Provided introduction to self, and explanation of the Care Transition Nurse role.     Patient: Noe Moctezuma    Patient : 1975   MRN: <[id number]>    Reason for Admission: Scheduled procedure-s/p colostomy takedown with incisional hernia repair with phasix mesh  Discharge Date: 24  RURS: Readmission Risk Score: 10.4      Last Discharge Facility       Date Complaint Diagnosis Description Type Department Provider    24  S/P colostomy takedown ... Admission (Discharged) Morgan Bañuelos MD            Was this an external facility discharge? No    Additional needs identified to be addressed with provider   No needs identified             Method of communication with provider: none.    Patients top risk factors for readmission: medical condition-Colon CA, s/p colostomy takedown, incisional hernia repair with phasix mesh    Interventions to address risk factors:   Review of patient management of conditions/medications:      Care Summary Note: Contacted pt for initial care transition follow up.  Spoke briefly with pt.  Corey states that he is doing well.  Reports \"some swelling which is expected\".  He will continue to monitor.  Reviewed s/s of infection.  Denies having any drainage or bleeding noted.  Pt is eating a soft diet.  He is currently tolerating his oral intake.  Denies having any nausea/vomiting.  Reports pain is \"tolerable\".  Just picked up his pain medication-Norco.  He is aware of the discharge instructions.  He is up and moving around.  He is not overexerting himself or doing anything strenuous.  States his wife is \"being a mother hen\".  Denies having any urinary issues.  Reports his bowels

## 2024-08-13 ENCOUNTER — OFFICE VISIT (OUTPATIENT)
Dept: SURGERY | Age: 49
End: 2024-08-13

## 2024-08-13 VITALS
BODY MASS INDEX: 29.63 KG/M2 | SYSTOLIC BLOOD PRESSURE: 122 MMHG | DIASTOLIC BLOOD PRESSURE: 82 MMHG | OXYGEN SATURATION: 100 % | HEART RATE: 79 BPM | TEMPERATURE: 97.9 F | HEIGHT: 72 IN

## 2024-08-13 DIAGNOSIS — Z98.890 S/P COLOSTOMY TAKEDOWN: Primary | ICD-10-CM

## 2024-08-13 PROCEDURE — 99024 POSTOP FOLLOW-UP VISIT: CPT | Performed by: NURSE PRACTITIONER

## 2024-08-13 RX ORDER — SULFAMETHOXAZOLE AND TRIMETHOPRIM 800; 160 MG/1; MG/1
1 TABLET ORAL 2 TIMES DAILY
Qty: 20 TABLET | Refills: 0 | Status: SHIPPED | OUTPATIENT
Start: 2024-08-13 | End: 2024-08-23

## 2024-08-13 ASSESSMENT — ENCOUNTER SYMPTOMS
VOMITING: 0
SHORTNESS OF BREATH: 0
ABDOMINAL PAIN: 1
NAUSEA: 0

## 2024-08-13 NOTE — PROGRESS NOTES
[facility] [place] [facility]  [facility] GENERAL SURGERY  [address]  [facility] [address]  Dept: [phone]  Dept Fax: [phone]  Loc: [phone]    Visit Date: 8/13/2024       Noe Moctezuma is a 48 y.o. male who presents today for:  Chief Complaint   Patient presents with    Post-Op Check     S/P Takedown Colostomy with Incisional Hernia Repair with Phasix Mesh Mobilization of hepatic flexure 7/29/24, discharged 8/5/24         HPI:     Noe presents today for a  post op check.  Today He has no complaints.  The incision is draining old blood which was not unexpected. There was a hematoma which was drained in hospital prior to discharge. Otherwise the incision looks good, no signs of infection noted, he is currently on bactrim. The old stoma site looks good and is healing.  He is tolerating meals, denies N&V, no fevers.  His bowels are functioning. Dr Covington opened up incision today to drain old hematoma, will keep on antibiotics at this time.   Plan to follow up in 2 weeks as discussed.         Past Medical History:   Diagnosis Date    Allergic rhinitis     spring allergies    Arthritis     hands    Cancer (HCC) 02/14/2024    colon cancer    Colitis     Diverticulitis     Hyperlipidemia       Past Surgical History:   Procedure Laterality Date    APPENDECTOMY  01/13/2023    COLONOSCOPY  2011    COLONOSCOPY  06/08/2020    [id number]    COLONOSCOPY  02/14/2024    Dr. Vasquez    CYSTOSCOPY Bilateral 2/28/2024    CYSTOSCOPY URETERAL STENT INSERTION, kim placement performed by Jos Ng MD at [facility] OR    HERNIA REPAIR  2007    LAPAROTOMY N/A 3/8/2024    Exploratory Laparotomy, Diverting Colostomy performed by Wale Fierro MD at [facility] OR    SIGMOID COLECTOMY N/A 2/28/2024    Robotic Left Sigmoid Colon Resection attempted, converted to open procedure performed by Caroline Covington MD at [facility] OR    SIGMOID COLECTOMY N/A 7/29/2024    Takedown

## 2024-08-14 ENCOUNTER — CARE COORDINATION (OUTPATIENT)
Dept: CASE MANAGEMENT | Age: 49
End: 2024-08-14

## 2024-08-14 NOTE — CARE COORDINATION
Care Transitions Note    Follow Up Call     Patient Current Location:  Home: [address] [place] [address]    Care Transition Nurse contacted the patient by telephone. Verified name and  as identifiers.    Additional needs identified to be addressed with provider   No needs identified                 Method of communication with provider: none.    Care Summary Note: Contacted pt for care transition follow up.  Spoke briefly with pt.  Corey states he is doing \"good\".  Had follow up with general surgery yesterday.  Reports incision is looking \"good\".  Denies having any s/s of infection.  Reports the hematoma was drained at the office.  States Abx was extended.  Pt prescribed Bactrim DS-1 tablet by mouth BID daily for 10 days.  Continues with a soft diet.  Eating chicken and fish.  He is staying away from hard to digest meats.  Tolerating well so far.  He is aware of when to contact provider.  No questions or concerns at this time.      Plan of care updates since last contact:  Education: will continue to monitor s/s       Advance Care Planning:   Does patient have an Advance Directive:  LASHON mailed documents back in April.   .    Medication Review:  Medications changed since last call, reviewed today.     Remote Patient Monitoring:  Offered patient enrollment in the Remote Patient Monitoring (RPM) program for in-home monitoring: Patient is not eligible for RPM program because: insurance coverage.    Assessments:  Care Transitions Subsequent and Final Call    Subsequent and Final Calls  Do you have any ongoing symptoms?: No  Have your medications changed?: Yes  Patient Reports: Abx extended for 10 more days  Do you have any questions related to your medications?: No  Do you currently have any active services?: No  Do you have any needs or concerns that I can assist you with?: No  Care Transitions Interventions   Home Care Waiver: Completed        Transportation Support: Declined   Disease Specific Clinic: Completed

## 2024-08-23 ENCOUNTER — CARE COORDINATION (OUTPATIENT)
Dept: CASE MANAGEMENT | Age: 49
End: 2024-08-23

## 2024-08-27 ENCOUNTER — OFFICE VISIT (OUTPATIENT)
Dept: SURGERY | Age: 49
End: 2024-08-27

## 2024-08-27 ENCOUNTER — CARE COORDINATION (OUTPATIENT)
Dept: CASE MANAGEMENT | Age: 49
End: 2024-08-27

## 2024-08-27 VITALS
BODY MASS INDEX: 28.71 KG/M2 | TEMPERATURE: 97.7 F | RESPIRATION RATE: 18 BRPM | WEIGHT: 212 LBS | SYSTOLIC BLOOD PRESSURE: 112 MMHG | HEART RATE: 78 BPM | HEIGHT: 72 IN | OXYGEN SATURATION: 93 % | DIASTOLIC BLOOD PRESSURE: 76 MMHG

## 2024-08-27 DIAGNOSIS — L98.9 SKIN LESION OF LEFT ARM: Primary | ICD-10-CM

## 2024-08-27 PROCEDURE — 99024 POSTOP FOLLOW-UP VISIT: CPT | Performed by: SURGERY

## 2024-08-27 NOTE — CARE COORDINATION
Care Transitions Note    Follow Up Call     Patient Current Location:  Home: [address] [place] [address]    Care Transition Nurse contacted the patient by telephone. Verified name and  as identifiers.    Additional needs identified to be addressed with provider   No needs identified                 Method of communication with provider: none.    Care Summary Note: Contacted pt for care transition follow up.  Spoke briefly with pt.  Corey states he is doing \"really well\".  Had follow up with the surgeon today.  Reports incisions look \"good and healing\".  Denies having any s/s of infection.  Reports that he has tried eating everything except for pork.  He is tolerating his oral intake so far.  No nausea/vomiting.  He is moving his bowels regularly.  Stools are formed.  Denies having any c/o pain or discomfort.  He has another follow up with general surgery in 2 weeks.  No questions or needs at this time.      Plan of care updates since last contact:  Education: continues to monitor s/s       Advance Care Planning:   Does patient have an Advance Directive:  not on file, LISW mailed doc back in April .    Medication Review:  No changes since last call.     Remote Patient Monitoring:  Offered patient enrollment in the Remote Patient Monitoring (RPM) program for in-home monitoring: Patient is not eligible for RPM program because: insurance coverage.    Assessments:  Care Transitions Subsequent and Final Call    Subsequent and Final Calls  Do you have any ongoing symptoms?: No  Have your medications changed?: No  Do you have any questions related to your medications?: No  Do you currently have any active services?: No  Do you have any needs or concerns that I can assist you with?: No  Care Transitions Interventions   Home Care Waiver: Completed        Transportation Support: Declined   Disease Specific Clinic: Completed      Disease Association: Completed      Other Interventions:              Follow Up Appointment:

## 2024-09-04 ENCOUNTER — CARE COORDINATION (OUTPATIENT)
Dept: CASE MANAGEMENT | Age: 49
End: 2024-09-04

## 2024-09-04 NOTE — CARE COORDINATION
[phone]            Patient has agreed to contact primary care provider and/or specialist for any further questions, concerns, or needs.    Danni Morales RN

## 2024-09-07 ENCOUNTER — PATIENT MESSAGE (OUTPATIENT)
Dept: FAMILY MEDICINE CLINIC | Age: 49
End: 2024-09-07

## 2024-09-10 ENCOUNTER — OFFICE VISIT (OUTPATIENT)
Dept: SURGERY | Age: 49
End: 2024-09-10
Payer: COMMERCIAL

## 2024-09-10 VITALS
TEMPERATURE: 97.4 F | DIASTOLIC BLOOD PRESSURE: 82 MMHG | OXYGEN SATURATION: 94 % | HEART RATE: 80 BPM | RESPIRATION RATE: 18 BRPM | SYSTOLIC BLOOD PRESSURE: 134 MMHG

## 2024-09-10 DIAGNOSIS — Z48.02 VISIT FOR SUTURE REMOVAL: Primary | ICD-10-CM

## 2024-09-10 PROCEDURE — 3079F DIAST BP 80-89 MM HG: CPT | Performed by: NURSE PRACTITIONER

## 2024-09-10 PROCEDURE — 3075F SYST BP GE 130 - 139MM HG: CPT | Performed by: NURSE PRACTITIONER

## 2024-09-10 PROCEDURE — 99212 OFFICE O/P EST SF 10 MIN: CPT | Performed by: NURSE PRACTITIONER

## 2024-09-11 ASSESSMENT — ENCOUNTER SYMPTOMS
ABDOMINAL PAIN: 0
SHORTNESS OF BREATH: 0
NAUSEA: 0
VOMITING: 0

## 2024-10-30 NOTE — TELEPHONE ENCOUNTER
Noe called requesting a refill of the below medication which has been pended for you:   Patient comment on medication states patients has not used medication since prescription ran out. Patient has upcoming appointment on 11/1/2024. Will discuss at appointment   Requested Prescriptions     Pending Prescriptions Disp Refills    lisinopril (PRINIVIL;ZESTRIL) 10 MG tablet [Pharmacy Med Name: LISINOPRIL 10MG TABLETS] 90 tablet 1     Sig: TAKE 1 TABLET BY MOUTH DAILY       Last Appointment Date: 6/28/2024  Next Appointment Date: 11/1/2024    No Known Allergies

## 2024-10-31 RX ORDER — LISINOPRIL 10 MG/1
10 TABLET ORAL DAILY
Qty: 90 TABLET | Refills: 1 | Status: SHIPPED | OUTPATIENT
Start: 2024-10-31 | End: 2024-11-01

## 2024-11-01 ENCOUNTER — OFFICE VISIT (OUTPATIENT)
Dept: FAMILY MEDICINE CLINIC | Age: 49
End: 2024-11-01

## 2024-11-01 VITALS
DIASTOLIC BLOOD PRESSURE: 88 MMHG | WEIGHT: 224.4 LBS | HEIGHT: 72 IN | SYSTOLIC BLOOD PRESSURE: 132 MMHG | OXYGEN SATURATION: 97 % | BODY MASS INDEX: 30.4 KG/M2 | HEART RATE: 62 BPM

## 2024-11-01 DIAGNOSIS — M25.561 ACUTE PAIN OF RIGHT KNEE: Primary | ICD-10-CM

## 2024-11-01 DIAGNOSIS — I10 PRIMARY HYPERTENSION: ICD-10-CM

## 2024-11-01 RX ORDER — PREDNISONE 20 MG/1
20 TABLET ORAL 2 TIMES DAILY
Qty: 10 TABLET | Refills: 0 | Status: SHIPPED | OUTPATIENT
Start: 2024-11-01 | End: 2024-11-06

## 2024-11-01 ASSESSMENT — ENCOUNTER SYMPTOMS
COUGH: 0
SHORTNESS OF BREATH: 0
CHEST TIGHTNESS: 0
WHEEZING: 0

## 2024-11-01 NOTE — PROGRESS NOTES
knee and hip was provided. He is advised to inform if there is no improvement or if symptoms worsen.    2. Hypertension: improving;  His blood pressure remains within normal range even without medication. He was advised to discontinue lisinopril for a period of time. If his blood pressure begins to rise again, the lisinopril can be reintroduced.    Return if symptoms worsen or fail to improve.      Orders Placed This Encounter   Medications    predniSONE (DELTASONE) 20 MG tablet     Sig: Take 1 tablet by mouth 2 times daily for 5 days     Dispense:  10 tablet     Refill:  0       Patientgiven educational materials - see patient instructions.  Discussed use, benefit,and side effects of prescribed medications.  All patient questions answered. Ptvoiced understanding. Reviewed health maintenance.  Instructed to continue currentmedications, diet and exercise.  Patient agreed with treatment plan. Follow up asdirected.     Attestation      The patient (or guardian, if applicable) and other individuals in attendance with the patient were advised that Artificial Intelligence will be utilized during this visit to record, process the conversation to generate a clinical note, and support improvement of the AI technology. The patient (or guardian, if applicable) and other individuals in attendance at the appointment consented to the use of AI, including the recording.        Electronically signed by Iliana Mejia MD on 11/1/2024 at 8:55 AM

## 2024-11-06 ENCOUNTER — PATIENT MESSAGE (OUTPATIENT)
Dept: FAMILY MEDICINE CLINIC | Age: 49
End: 2024-11-06

## 2024-11-06 DIAGNOSIS — M25.561 ACUTE PAIN OF RIGHT KNEE: Primary | ICD-10-CM

## 2024-11-13 ENCOUNTER — HOSPITAL ENCOUNTER (OUTPATIENT)
Dept: PHYSICAL THERAPY | Age: 49
Setting detail: THERAPIES SERIES
Discharge: HOME | End: 2024-11-13
Attending: FAMILY MEDICINE
Payer: COMMERCIAL

## 2024-11-13 ENCOUNTER — HOSPITAL ENCOUNTER (OUTPATIENT)
Dept: MRI IMAGING | Age: 49
Discharge: HOME | End: 2024-11-15
Attending: FAMILY MEDICINE
Payer: COMMERCIAL

## 2024-11-13 DIAGNOSIS — M25.561 ACUTE PAIN OF RIGHT KNEE: ICD-10-CM

## 2024-11-13 PROCEDURE — 97161 PT EVAL LOW COMPLEX 20 MIN: CPT | Performed by: PHYSICAL THERAPIST

## 2024-11-13 PROCEDURE — 73721 MRI JNT OF LWR EXTRE W/O DYE: CPT

## 2024-11-13 ASSESSMENT — PAIN DESCRIPTION - PAIN TYPE: TYPE: ACUTE PAIN

## 2024-11-13 ASSESSMENT — PAIN DESCRIPTION - DESCRIPTORS: DESCRIPTORS: ACHING;SHARP

## 2024-11-13 ASSESSMENT — PAIN DESCRIPTION - ORIENTATION: ORIENTATION: RIGHT

## 2024-11-13 ASSESSMENT — PAIN DESCRIPTION - LOCATION: LOCATION: KNEE;HIP

## 2024-11-13 ASSESSMENT — PAIN SCALES - GENERAL: PAINLEVEL_OUTOF10: 2

## 2024-11-13 NOTE — PLAN OF CARE
[facility]  Rehabilitation and Sports Medicine    [] [place]  Phone: [phone]  Fax: [phone]      [] [place]  Phone: [phone]  Fax: [phone]        To:        Patient: Noe Moctezuma  : 1975   MRN: [id number]  Evaluation Date: 2024      Diagnosis Information:  Diagnosis: M25.561 R knee pain   Treatment Diagnosis: Mechanical LBP with R sciatica     Physical Therapy Certification Form  Dear   The following patient has been evaluated for physical therapy services and for therapy to continue, Medicare requires monthly physician review of the treatment plan. Please review the attached evaluation and/or summary of the patient's plan of care, and verify that you agree therapy should continue by signing the attached document and sending it back to our office.    Plan of Care/Treatment to date:  [x] Therapeutic Exercise    [] Modalities:  [] Therapeutic Activity     [] Ultrasound  [x] Electrical Stimulation  [] Gait Training      [] Cervical Traction [] Lumbar Traction  [] Neuromuscular Re-education    [] Cold/hotpack [] Iontophoresis   [x] Instruction in HEP     Other:  [x] Manual Therapy      []             [] Aquatic Therapy      []                 Goals:  Short Term Goals  Time Frame for Short Term Goals: 1 week  Short Term Goal 1: Start HEP for suspected mechanical LBP  Short Term Goal 2: Start HEP for R patello-femoral pain  Long Term Goal 1: Able to walk , without limp 40 min  Long Term Goal 2: Up/down steps without pain increase  Long Term Goal 3: Able to sit 30-40 min with pain controlled       Frequency/Duration:24 - 24  # Days per week: [] 1 day # Weeks: [] 1 week [] 5 weeks     [x] 2 days   [] 2 weeks [] 6 weeks     [] 3 days   [] 3 weeks [] 7 weeks     [] 4 days   [x] 4 weeks [] 8 weeks    Rehab Potential: [] Excellent [x] Good [] Fair  [] Poor     Electronically signed by:  Jose Angel Gill PT      If you have any questions or concerns, please

## 2024-11-13 NOTE — PROGRESS NOTES
time employment  Type of Occupation:   Receives Help From: Family  ADL Assistance: Independent  Homemaking Assistance: Independent  Ambulation Assistance: Independent  Transfer Assistance: Independent  Active : Yes    Objective:     PROM RLE (degrees)  R Hip Flexion (0-125): 110 , R LBP, hip pain  R Hip Extension (0-10): 0  R Hip ABduction (0-45): 40  R Hip External Rotation (0-45): 35, tight  R Hip Internal Rotation (0-45): 15 , tight, +hip pain  R Knee Flexion (0-145): 145, no pain  R Knee Extension (0): 0, no pain  Spine  Lumbar: Flexion mod loss to mid shin,+pain. Extension major loss of 70%, + pain. B side glide mod loss + R LBP  Special Tests: FIS, RFIS increase, worse, R back/buttock/hip. EIS, ARCENIO produce, better, increased ROM. RITA, RFIL proc=duce, worse. EIL, REIL decrease, better, central, and increased ROM. R SGIL decrease, better.  Joint Mobility  Spine: Lumbar spine post derangement.  ROM RLE: R hip joint tight    Strength RLE  R Hip Flexion: 4-/5  R Hip Extension: 4+/5  R Hip ABduction: 4+/5  R Knee Flexion: 5/5  R Knee Extension: 5/5     Additional Measures  Special Tests: negative R knee ligament & meniscus tests.  Other: R + R SLR at 55 deg, R back/buttock pain        Ambulation  Surface: Level tile  Device: No Device  Quality of Gait: Slight forward flex deformity  Stairs/Curb  Stairs?: Yes  Stairs  Stairs Height: 8\"  Comment: Reproduces R hip/back pain         Assessment:   Conditions Requiring Skilled Therapeutic Intervention  Body Structures, Functions, Activity Limitations Requiring Skilled Therapeutic Intervention: Increased pain;Decreased ROM;Decreased strength  Therapy Prognosis: Good  Treatment Diagnosis: Mechanical LBP with R sciatica  Activity Tolerance  Activity Tolerance: Patient tolerated treatment well  Activity Tolerance: Patient tolerated treatment well      Plan:   Physical Therapy Plan  Plan weeks: 4  Current Treatment Recommendations: Strengthening, ROM,

## 2024-11-13 NOTE — FLOWSHEET NOTE
(85102)  [] Reviewed/Progressed HEP activities related to improving balance, coordination, kinesthetic sense, posture, motor skill, proprioception.  (83905)    Manual Treatments:    [] Provided manual therapy to mobilize soft tissue/joints for the purpose of modulating pain, promoting relaxation,  increasing ROM, reducing/eliminating soft tissue swelling/inflammation/restriction, improving soft tissue extensibility. (55900)    Service Based Modalities:  Eval 40'    Timed Code Treatment Minutes:   there ex/ HEP 10'    Total Treatment Minutes:   50'    Treatment/Activity Tolerance:  [x] Patient tolerated treatment well [] Patient limited by fatique  [] Patient limited by pain  [] Patient limited by other medical complications  [] Other:     Prognosis: [x] Good [] Fair  [] Poor    Patient Requires Follow-up: [x] Yes  [] No      Goals:  Short Term Goals  Time Frame for Short Term Goals: 1 week  Short Term Goal 1: Start HEP for suspected mechanical LBP  Short Term Goal 2: Start HEP for R patello-femoral pain  Long Term Goal 1: Able to walk , without limp 40 min  Long Term Goal 2: Up/down steps without pain increase  Long Term Goal 3: Able to sit 30-40 min with pain controlled               Plan:   [] Continue per plan of care [] Alter current plan (see comments)  [x] Plan of care initiated [] Hold pending MD visit [] Discharge  Plan for Next Session:      Electronically signed by:  Jose Angel Gill PT,PT

## 2024-11-15 ENCOUNTER — HOSPITAL ENCOUNTER (OUTPATIENT)
Dept: PHYSICAL THERAPY | Age: 49
Setting detail: THERAPIES SERIES
Discharge: HOME | End: 2024-11-15
Attending: FAMILY MEDICINE
Payer: COMMERCIAL

## 2024-11-15 PROCEDURE — 97110 THERAPEUTIC EXERCISES: CPT

## 2024-11-15 NOTE — FLOWSHEET NOTE
Physical Therapy Daily Treatment Note    Date:  11/15/2024    Patient Name:  Noe Moctezuma    :  1975  MRN: [id number]  Restrictions/Precautions:3 abdominal surgeries this year for Colon CA     Medical/Treatment Diagnosis Information:   Diagnosis: M25.561 R knee pain  Treatment Diagnosis: Mechanical LBP with R sciatica  Insurance/Certification information:  PT Insurance Information: BC  Physician Information:   Jackie  Plan of care signed (Y/N):  y  Visit# / total visits:    Pain level: 2/10       Time In: 8:00   Time Out: 8:38    Progress Note: []  Yes  [x]  No  Next due by: Visit #10  , or 24    Subjective:   Pt reports back is tender this morning however R LE is feeling good. Pt reports HEP compliance. Knee MRI came back and has pinching of \"fat pad\".    Objective: LUIZA performed per flow sheet for increased mobility and strengthening for improvements in ambulation and completion of ADLs. Verbal cueing for sequencing and proper form.    Observation:   Test measurements:      Exercises:   Exercise/Equipment Resistance/Repetitions Other comments   Prone in R SG 2'    Prone on elbows 2'    Press up 10x    B PKF 10x    Hip ext in prone 10x    R Hip IR/ER 10x red    Modified extension 10x    Back bend 10x         Abdominal sets  10x3\"    bridge 10x    LTR 10x5\"    Hip abd/ext 10x               R TKE 10x red    HS curl in prone 10x         TM retro NEXT         [x] Provided verbal/tactile cueing for activities related to strengthening, flexibility, endurance, ROM. (02391)  [] Provided verbal/tactile cueing for activities related to improving balance, coordination, kinesthetic sense, posture, motor skill, proprioception. (55513)    Therapeutic Activities:     [] Therapeutic activities, direct (one-on-one) patient contact (use of dynamic activities to improve functional performance). (14383)    Gait:   [] Provided training and instruction to the patient for ambulation re-education.

## 2024-11-18 ENCOUNTER — HOSPITAL ENCOUNTER (OUTPATIENT)
Dept: PHYSICAL THERAPY | Age: 49
Setting detail: THERAPIES SERIES
Discharge: HOME | End: 2024-11-18
Attending: FAMILY MEDICINE
Payer: COMMERCIAL

## 2024-11-18 PROCEDURE — 97110 THERAPEUTIC EXERCISES: CPT | Performed by: PHYSICAL THERAPY ASSISTANT

## 2024-11-18 NOTE — FLOWSHEET NOTE
contact (use of dynamic activities to improve functional performance). (04897)    Gait:   [] Provided training and instruction to the patient for ambulation re-education. (94212)    Self-Care/ADL's  [] Self-care/home management training and compensatory training, meal preparation, safety procedures, and instructions in use of assistive technology devices/adaptive equipment, direct one-on-one contact. (38554)    Home Exercise Program:   Lumbar extension progression for lumbar post derangement  [x] Reviewed/Progressed HEP activities related to strengthening, flexibility, endurance, ROM. (23583)  [] Reviewed/Progressed HEP activities related to improving balance, coordination, kinesthetic sense, posture, motor skill, proprioception.  (16766)    Manual Treatments:    [] Provided manual therapy to mobilize soft tissue/joints for the purpose of modulating pain, promoting relaxation,  increasing ROM, reducing/eliminating soft tissue swelling/inflammation/restriction, improving soft tissue extensibility. (64393)    Service Based Modalities:      Timed Code Treatment Minutes:   there ex 38'    Total Treatment Minutes:   38'    Treatment/Activity Tolerance:  [x] Patient tolerated treatment well [] Patient limited by fatique  [] Patient limited by pain  [] Patient limited by other medical complications  [] Other:     Prognosis: [x] Good [] Fair  [] Poor    Patient Requires Follow-up: [x] Yes  [] No      Goals:  Short Term Goals  Time Frame for Short Term Goals: 1 week  Short Term Goal 1: Start HEP for suspected mechanical LBP (provided)  Short Term Goal 2: Start HEP for R patello-femoral pain (next)  Long Term Goal 1: Able to walk , without limp 40 min  Long Term Goal 2: Up/down steps without pain increase  Long Term Goal 3: Able to sit 30-40 min with pain controlled (~60 min)     Plan:   [x] Continue per plan of care [] Alter current plan (see comments)  [] Plan of care initiated [] Hold pending MD visit [] Discharge    Plan

## 2024-11-19 NOTE — PROGRESS NOTES
I have reviewed and agree to the content of the note written by the PTA.  Electronically signed by Jose Angel Gill PT [id number]

## 2024-11-21 ENCOUNTER — HOSPITAL ENCOUNTER (OUTPATIENT)
Dept: PHYSICAL THERAPY | Age: 49
Setting detail: THERAPIES SERIES
Discharge: HOME | End: 2024-11-21
Attending: FAMILY MEDICINE
Payer: COMMERCIAL

## 2024-11-21 PROCEDURE — 97110 THERAPEUTIC EXERCISES: CPT | Performed by: PHYSICAL THERAPIST

## 2024-11-21 NOTE — FLOWSHEET NOTE
Physical Therapy Daily Treatment Note    Date:  2024    Patient Name:  Noe Moctezuma    :  1975  MRN: [id number]  Restrictions/Precautions:3 abdominal surgeries this year for Colon CA     Medical/Treatment Diagnosis Information:   Diagnosis: M25.561 R knee pain  Treatment Diagnosis: Mechanical LBP with R sciatica  Insurance/Certification information:  PT Insurance Information: BCBS  Physician Information:   Jackie  Plan of care signed (Y/N):  y  Visit# / total visits:    Pain level: 1-2/10       Time In:8:00   Time Out: 8:31    Progress Note: []  Yes  [x]  No  Next due by: Visit #10  , or 24    Subjective:  Good pain reduction since last week. Mild LBP. Knee pain is infrequent   Objective:   Observation:   Test measurements:    Prone extension deficient 5%. Mild back stiffness. Some abdominal muscle tension.    Exercises:   Exercise/Equipment Resistance/Repetitions Other comments   Prone in R SG 3'    Prone on elbows 2'    Press up 10x2 Exhale OP   B PKF 10x2    Hip ext in prone 10x, x2    R Hip IR/ER 10x red         Modified extension 10x    Back bend 10x         Abdominal sets  10x3\"    bridge     LTR 10x5\"    Hip abd/add                    Standing hip abd, ext, HS curl 10x ea     R TKE 10x red    HS curl-sit          TM retro 1.1 MPH 5'         [x] Provided verbal/tactile cueing for activities related to strengthening, flexibility, endurance, ROM. (07223)  [] Provided verbal/tactile cueing for activities related to improving balance, coordination, kinesthetic sense, posture, motor skill, proprioception. (20956)    Therapeutic Activities:     [] Therapeutic activities, direct (one-on-one) patient contact (use of dynamic activities to improve functional performance). (44689)    Gait:   [] Provided training and instruction to the patient for ambulation re-education. (18934)    Self-Care/ADL's  [] Self-care/home management training and compensatory training, meal preparation, safety

## 2024-11-25 ENCOUNTER — HOSPITAL ENCOUNTER (OUTPATIENT)
Dept: PHYSICAL THERAPY | Age: 49
Setting detail: THERAPIES SERIES
Discharge: HOME | End: 2024-11-25
Attending: FAMILY MEDICINE
Payer: COMMERCIAL

## 2024-11-25 PROCEDURE — 97110 THERAPEUTIC EXERCISES: CPT

## 2024-11-25 NOTE — FLOWSHEET NOTE
30-40 min with pain controlled (~60 min)     Plan:   [x] Continue per plan of care [] Alter current plan (see comments)  [] Plan of care initiated [] Hold pending MD visit [] Discharge    Plan for Next Session:  Monitor tolerance to progressed and advanced exercises.     Electronically signed by:  CELIA CHESTER PTA

## 2024-11-25 NOTE — PROGRESS NOTES
I have reviewed and agree to the content of the note written by the PTA.  Electronically signed by Jose Angel Gill PT [id number]

## 2024-11-27 ENCOUNTER — HOSPITAL ENCOUNTER (OUTPATIENT)
Dept: PHYSICAL THERAPY | Age: 49
Setting detail: THERAPIES SERIES
Discharge: HOME | End: 2024-11-27
Attending: FAMILY MEDICINE
Payer: COMMERCIAL

## 2024-11-27 PROCEDURE — 97110 THERAPEUTIC EXERCISES: CPT

## 2024-11-27 PROCEDURE — 97150 GROUP THERAPEUTIC PROCEDURES: CPT

## 2024-11-27 NOTE — FLOWSHEET NOTE
Physical Therapy Daily Treatment Note    Date:  2024    Patient Name:  Noe Moctezuma    :  1975  MRN: [id number]  Restrictions/Precautions:3 abdominal surgeries this year for Colon CA     Medical/Treatment Diagnosis Information:   Diagnosis: M25.561 R knee pain  Treatment Diagnosis: Mechanical LBP with R sciatica  Insurance/Certification information:  PT Insurance Information: BC  Physician Information:   Jackie  Plan of care signed (Y/N):  y  Visit# / total visits:    Pain level: 3/10       Time In:915   Time Out: 953    915-925 groupex       Progress Note: []  Yes  [x]  No  Next due by: Visit #10  , or 24    Subjective: Pt reports improvement overall, no knee pain.  Patient noting hip pain at the SI joint continues.     Objective: LUIZA performed per flow sheet for increased mobility and strengthening for improvements in ambulation and completion of ADLs. Verbal cueing for sequencing and proper form.  Grade 4 PA mob of L5-S1 and the SI joint more focussed on the R.       Observation:     Test measurements:    Prone extension deficient 5%. Mild back stiffness. Some abdominal muscle tension.    Exercises:   Exercise/Equipment Resistance/Repetitions Other comments   Prone in R SG 3'    Prone on elbows 2'    Press up 10x2 Exhale OP   B PKF 10x2    Hip ext in prone 10x, x2    R Hip IR/ER 10x red         Modified extension 10x    Back bend 10x         Abdominal sets  10x3\"    bridge     LTR 10x5\"    Hip abd/add          PA jt mob           Standing hip abd, ext, HS curl 10x ea     R TKE 10x red    HS curl-sit          TM retro 1.1 MPH 5'         [x] Provided verbal/tactile cueing for activities related to strengthening, flexibility, endurance, ROM. (07993)  [] Provided verbal/tactile cueing for activities related to improving balance, coordination, kinesthetic sense, posture, motor skill, proprioception. (99690)    Therapeutic Activities:     [] Therapeutic activities, direct (one-on-one)

## 2024-12-02 ENCOUNTER — HOSPITAL ENCOUNTER (OUTPATIENT)
Dept: PHYSICAL THERAPY | Age: 49
Setting detail: THERAPIES SERIES
Discharge: HOME | End: 2024-12-02
Attending: FAMILY MEDICINE
Payer: COMMERCIAL

## 2024-12-02 PROCEDURE — 97110 THERAPEUTIC EXERCISES: CPT | Performed by: PHYSICAL THERAPIST

## 2024-12-02 NOTE — FLOWSHEET NOTE
management training and compensatory training, meal preparation, safety procedures, and instructions in use of assistive technology devices/adaptive equipment, direct one-on-one contact. (60244)    Home Exercise Program:   Lumbar extension progression for lumbar post derangement                                                T-Band TKE & HS curl  [x] Reviewed/Progressed HEP activities related to strengthening, flexibility, endurance, ROM. (00125)  [] Reviewed/Progressed HEP activities related to improving balance, coordination, kinesthetic sense, posture, motor skill, proprioception.  (19074)    Manual Treatments:    [] Provided manual therapy to mobilize soft tissue/joints for the purpose of modulating pain, promoting relaxation,  increasing ROM, reducing/eliminating soft tissue swelling/inflammation/restriction, improving soft tissue extensibility. (11037)    Service Based Modalities:      Timed Code Treatment Minutes:   there ex 29'    Total Treatment Minutes:   29    Treatment/Activity Tolerance:  [x] Patient tolerated treatment well [] Patient limited by fatique  [] Patient limited by pain  [] Patient limited by other medical complications  [] Other:     Prognosis: [x] Good [] Fair  [] Poor    Patient Requires Follow-up: [x] Yes  [] No      Goals:  Short Term Goals  Time Frame for Short Term Goals: 1 week  Short Term Goal 1: Start HEP for suspected mechanical LBP -met  Short Term Goal 2: Start HEP for R patello-femoral pain - met  Long Term Goal 1: Able to walk , without limp 40 min  Long Term Goal 2: Up/down steps without pain increase- met  Long Term Goal 3: Able to sit 30-40 min with pain controlled (~60 min)     Plan:   [x] Continue per plan of care [] Alter current plan (see comments)  [] Plan of care initiated [] Hold pending MD visit [] Discharge    Plan for Next Session:  Monitor tolerance to progressed and advanced exercises.     Electronically signed by:  Jose Angel Gill, PT

## 2024-12-05 ENCOUNTER — APPOINTMENT (OUTPATIENT)
Dept: PHYSICAL THERAPY | Age: 49
End: 2024-12-05
Attending: FAMILY MEDICINE
Payer: COMMERCIAL

## 2024-12-09 ENCOUNTER — APPOINTMENT (OUTPATIENT)
Dept: PHYSICAL THERAPY | Age: 49
End: 2024-12-09
Attending: FAMILY MEDICINE
Payer: COMMERCIAL

## 2024-12-11 ENCOUNTER — HOSPITAL ENCOUNTER (OUTPATIENT)
Dept: PHYSICAL THERAPY | Age: 49
Setting detail: THERAPIES SERIES
Discharge: HOME | End: 2024-12-11
Attending: FAMILY MEDICINE
Payer: COMMERCIAL

## 2024-12-11 PROCEDURE — 97110 THERAPEUTIC EXERCISES: CPT | Performed by: PHYSICAL THERAPIST

## 2024-12-11 NOTE — FLOWSHEET NOTE
Physical Therapy Daily Treatment Note    Date:  2024    Patient Name:  Noe Moctezuma    :  1975  MRN: [id number]  Restrictions/Precautions:3 abdominal surgeries this year for Colon CA     Medical/Treatment Diagnosis Information:   Diagnosis: M25.561 R knee pain  Treatment Diagnosis: Mechanical LBP with R sciatica  Insurance/Certification information:  PT Insurance Information: BCBS  Physician Information:   Jackie  Plan of care signed (Y/N):  y  Visit# / total visits:    Pain level: 2/10       Time In:8:48  Time Out: 9:26      Progress Note: []  Yes  [x]  No  Next due by: Visit #10  , or 24    Subjective: \"I don't have any pain in the right knee anymore, now it's just in the R hip.\" Patient stated that hip IR/ER stretch in prone felt really good in his right hip and wide bridges felt good, but that he could tell there was hip/buttock weakness in that motion     Objective:     Observation:     Test measurements:    Prone extension to end-range, tight.   SLR negative dural tension. HS tension at 65 deg    Progressed repetitions with ex this date to increase strength and endurance. Pt demonstrated evidence of peripheralization    Exercises:   Exercise/Equipment Resistance/Repetitions Other comments   Prone in R SG 4'    Prone on elbows 3'    Press up 10x3 Exhale OP. PT OP    B PKF 10x2    Hip ext in prone 10x, x2    R Hip IR/ER 20x red         Modified extension 10x    Back bend 10x         Abdominal sets  10x3\"    bridge 10x each Narrow, wide   LTR 10x5\"    Hip abd/add 10x         PA jt mob           Standing hip abd, ext, HS curl 15x ea     R TKE 10x red    HS curl-sit          TM retro 1.4 MPH 5'         [x] Provided verbal/tactile cueing for activities related to strengthening, flexibility, endurance, ROM. (84971)  [] Provided verbal/tactile cueing for activities related to improving balance, coordination, kinesthetic sense, posture, motor skill, proprioception. (00132)    Therapeutic

## 2024-12-12 ENCOUNTER — APPOINTMENT (OUTPATIENT)
Dept: PHYSICAL THERAPY | Age: 49
End: 2024-12-12
Attending: FAMILY MEDICINE
Payer: COMMERCIAL

## 2024-12-13 ENCOUNTER — HOSPITAL ENCOUNTER (OUTPATIENT)
Dept: PHYSICAL THERAPY | Age: 49
Setting detail: THERAPIES SERIES
Discharge: HOME | End: 2024-12-13
Attending: FAMILY MEDICINE
Payer: COMMERCIAL

## 2024-12-13 PROCEDURE — 9990000011 HC NO CHARGE THERAPY VISIT

## 2024-12-13 NOTE — PLAN OF CARE
[facility]  Rehabilitation and Sports Medicine    [x] [place]  Phone: [phone]  Fax: [phone]      [] [place]  Phone: [phone]  Fax: [phone]    Physical Therapy Progress Note  Date: 2024        Patient Name:  Noe Moctezuma    :  1975  MRN: [id number]  Restrictions/Precautions:3 abdominal surgeries this year for Colon CA     Medical/Treatment Diagnosis Information:   Diagnosis: M25.561 R knee pain  Treatment Diagnosis: Mechanical LBP with R sciatica  Insurance/Certification information:  PT Insurance Information: BC  Physician Information:   Jackie  Plan of care signed (Y/N):  y  Visit# / total visits:    Pain level:      2/10          Time Period for Report:  24-24  Cancels/No-shows to date:  0    Plan of Care/Treatment to date:  [x] Therapeutic Exercise    [x] Modalities:  [x] Therapeutic Activity     [] Ultrasound  [] Electrical Stimulation  [x] Gait Training      [] Cervical Traction    [] Lumbar Traction  [x] Neuromuscular Re-education  [] Cold/hotpack [] Iontophoresis  [x] Instruction in HEP      Other:  [x] Manual Therapy       []    [] Aquatic Therapy       []                           Subjective:   \"I don't have any pain in the right knee anymore, now it's just in the R hip.\" Patient stated that hip IR/ER stretch in prone felt really good in his right hip and wide bridges felt good, but that he could tell there was hip/buttock weakness in that motion.  Current hip pain 2/10, at worst 4/10.  Knee pain correlates with hip pain.  Patient can now walk x 40 min without increase in pain.       Objective:     Observation:      Test measurements:  Hip flexion 4+/5, knee 5/5   Prone extension to end-range, tight.   SLR negative dural tension. HS tension at 65 deg  Oswestry 15%      Progressed repetitions with ex this date to increase strength and endurance. Pt demonstrated evidence of peripheralization          Assessment:  Patient progressing

## 2024-12-13 NOTE — FLOWSHEET NOTE
Physical Therapy Daily Treatment Note    Date:  2024    Patient Name:  Noe Moctezuma    :  1975  MRN: [id number]  Restrictions/Precautions:3 abdominal surgeries this year for Colon CA     Medical/Treatment Diagnosis Information:   Diagnosis: M25.561 R knee pain  Treatment Diagnosis: Mechanical LBP with R sciatica  Insurance/Certification information:  PT Insurance Information: BCBS  Physician Information:   Jackie  Plan of care signed (Y/N):  y  Visit# / total visits:    Pain level: 10       Time In:8:48  Time Out: 9:26      Progress Note: []  Yes  [x]  No  Next due by: Visit #10  , or 24    Subjective: \"I don't have any pain in the right knee anymore, now it's just in the R hip.\" Patient stated that hip IR/ER stretch in prone felt really good in his right hip and wide bridges felt good, but that he could tell there was hip/buttock weakness in that motion     Objective:     Observation:     Test measurements:    Prone extension to end-range, tight.   SLR negative dural tension. HS tension at 65 deg    Progressed repetitions with ex this date to increase strength and endurance. Pt demonstrated evidence of peripheralization    Exercises:   Exercise/Equipment Resistance/Repetitions Other comments   Prone in R SG 4'    Prone on elbows 3'    Press up 10x3 Exhale OP. PT OP    B PKF 10x2    Hip ext in prone 10x, x2    R Hip IR/ER 20x red         Modified extension 10x    Back bend 10x         Abdominal sets  10x3\"    bridge 10x each Narrow, wide   LTR 10x5\"    Hip abd/add 10x         PA jt mob           Standing hip abd, ext, HS curl 15x ea     R TKE 10x red    HS curl-sit          TM retro 1.4 MPH 5'         [x] Provided verbal/tactile cueing for activities related to strengthening, flexibility, endurance, ROM. (79834)  [] Provided verbal/tactile cueing for activities related to improving balance, coordination, kinesthetic sense, posture, motor skill, proprioception. (56937)    Therapeutic

## 2025-02-20 ENCOUNTER — APPOINTMENT (OUTPATIENT)
Dept: CT IMAGING | Age: 50
End: 2025-02-20
Payer: COMMERCIAL

## 2025-02-20 ENCOUNTER — HOSPITAL ENCOUNTER (EMERGENCY)
Age: 50
Discharge: HOME | End: 2025-02-20
Attending: EMERGENCY MEDICINE
Payer: COMMERCIAL

## 2025-02-20 ENCOUNTER — APPOINTMENT (OUTPATIENT)
Dept: GENERAL RADIOLOGY | Age: 50
End: 2025-02-20
Payer: COMMERCIAL

## 2025-02-20 VITALS
RESPIRATION RATE: 16 BRPM | SYSTOLIC BLOOD PRESSURE: 159 MMHG | WEIGHT: 224 LBS | HEIGHT: 72 IN | HEART RATE: 55 BPM | OXYGEN SATURATION: 99 % | BODY MASS INDEX: 30.34 KG/M2 | TEMPERATURE: 97.3 F | DIASTOLIC BLOOD PRESSURE: 97 MMHG

## 2025-02-20 DIAGNOSIS — I10 HYPERTENSION, UNSPECIFIED TYPE: ICD-10-CM

## 2025-02-20 DIAGNOSIS — I16.0 HYPERTENSIVE URGENCY: Primary | ICD-10-CM

## 2025-02-20 LAB
ANION GAP SERPL CALCULATED.3IONS-SCNC: 8 MMOL/L (ref 9–17)
BASOPHILS # BLD: 0.04 K/UL (ref 0–0.2)
BASOPHILS NFR BLD: 1 % (ref 0–2)
BUN SERPL-MCNC: 11 MG/DL (ref 6–20)
BUN/CREAT SERPL: 12 (ref 9–20)
CALCIUM SERPL-MCNC: 9.6 MG/DL (ref 8.6–10.4)
CHLORIDE SERPL-SCNC: 104 MMOL/L (ref 98–107)
CO2 SERPL-SCNC: 29 MMOL/L (ref 20–31)
CREAT SERPL-MCNC: 0.9 MG/DL (ref 0.7–1.2)
EKG ATRIAL RATE: 58 BPM
EKG P AXIS: 69 DEGREES
EKG P-R INTERVAL: 138 MS
EKG Q-T INTERVAL: 418 MS
EKG QRS DURATION: 144 MS
EKG QTC CALCULATION (BAZETT): 410 MS
EKG R AXIS: 30 DEGREES
EKG T AXIS: 26 DEGREES
EKG VENTRICULAR RATE: 58 BPM
EOSINOPHIL # BLD: 0.13 K/UL (ref 0–0.44)
EOSINOPHILS RELATIVE PERCENT: 2 % (ref 1–4)
ERYTHROCYTE [DISTWIDTH] IN BLOOD BY AUTOMATED COUNT: 12.5 % (ref 11.8–14.4)
GFR, ESTIMATED: >90 ML/MIN/1.73M2
GLUCOSE SERPL-MCNC: 95 MG/DL (ref 70–99)
HCT VFR BLD AUTO: 47.3 % (ref 40.7–50.3)
HGB BLD-MCNC: 16.8 G/DL (ref 13–17)
IMM GRANULOCYTES # BLD AUTO: 0.04 K/UL (ref 0–0.3)
IMM GRANULOCYTES NFR BLD: 1 %
LYMPHOCYTES NFR BLD: 2.17 K/UL (ref 1.1–3.7)
LYMPHOCYTES RELATIVE PERCENT: 31 % (ref 24–43)
MCH RBC QN AUTO: 29.8 PG (ref 25.2–33.5)
MCHC RBC AUTO-ENTMCNC: 35.5 G/DL (ref 25.2–33.5)
MCV RBC AUTO: 83.9 FL (ref 82.6–102.9)
MONOCYTES NFR BLD: 0.97 K/UL (ref 0.1–1.2)
MONOCYTES NFR BLD: 14 % (ref 3–12)
NEUTROPHILS NFR BLD: 51 % (ref 36–65)
NEUTS SEG NFR BLD: 3.67 K/UL (ref 1.5–8.1)
NRBC BLD-RTO: 0 PER 100 WBC
PLATELET # BLD AUTO: 191 K/UL (ref 138–453)
PMV BLD AUTO: 10.9 FL (ref 8.1–13.5)
POTASSIUM SERPL-SCNC: 4.6 MMOL/L (ref 3.7–5.3)
RBC # BLD AUTO: 5.64 M/UL (ref 4.21–5.77)
SODIUM SERPL-SCNC: 141 MMOL/L (ref 135–144)
TROPONIN I SERPL HS-MCNC: <6 NG/L (ref 0–22)
WBC OTHER # BLD: 7 K/UL (ref 3.5–11.3)

## 2025-02-20 PROCEDURE — 70450 CT HEAD/BRAIN W/O DYE: CPT

## 2025-02-20 PROCEDURE — 80048 BASIC METABOLIC PNL TOTAL CA: CPT

## 2025-02-20 PROCEDURE — 85025 COMPLETE CBC W/AUTO DIFF WBC: CPT

## 2025-02-20 PROCEDURE — 84484 ASSAY OF TROPONIN QUANT: CPT

## 2025-02-20 PROCEDURE — 71045 X-RAY EXAM CHEST 1 VIEW: CPT

## 2025-02-20 PROCEDURE — 99285 EMERGENCY DEPT VISIT HI MDM: CPT

## 2025-02-20 PROCEDURE — 93005 ELECTROCARDIOGRAM TRACING: CPT | Performed by: EMERGENCY MEDICINE

## 2025-02-20 PROCEDURE — 6370000000 HC RX 637 (ALT 250 FOR IP): Performed by: EMERGENCY MEDICINE

## 2025-02-20 RX ORDER — LISINOPRIL 20 MG/1
20 TABLET ORAL DAILY
Qty: 14 TABLET | Refills: 0 | Status: SHIPPED | OUTPATIENT
Start: 2025-02-20 | End: 2025-03-06

## 2025-02-20 RX ORDER — SODIUM, POTASSIUM,MAG SULFATES 17.5-3.13G
SOLUTION, RECONSTITUTED, ORAL ORAL
COMMUNITY
Start: 2025-01-14

## 2025-02-20 RX ORDER — LISINOPRIL 5 MG/1
20 TABLET ORAL ONCE
Status: COMPLETED | OUTPATIENT
Start: 2025-02-20 | End: 2025-02-20

## 2025-02-20 RX ADMIN — LISINOPRIL 20 MG: 5 TABLET ORAL at 13:39

## 2025-02-20 ASSESSMENT — LIFESTYLE VARIABLES
HOW OFTEN DO YOU HAVE A DRINK CONTAINING ALCOHOL: NEVER
HOW MANY STANDARD DRINKS CONTAINING ALCOHOL DO YOU HAVE ON A TYPICAL DAY: PATIENT DOES NOT DRINK

## 2025-02-20 ASSESSMENT — PAIN - FUNCTIONAL ASSESSMENT: PAIN_FUNCTIONAL_ASSESSMENT: NONE - DENIES PAIN

## 2025-02-20 NOTE — ED PROVIDER NOTES
[facility] Emergency Department  [address] [address]  Phone: [phone]        [facility] EMERGENCY DEPARTMENT  EMERGENCY DEPARTMENT ENCOUNTER      Pt Name: Noe Moctezuma  MRN: [id number]  Birthdate 1975  Date of evaluation: 2/20/2025  Provider: Kate Kearns MD    CHIEF COMPLAINT       Chief Complaint   Patient presents with    Hypertension    Dizziness         HISTORY OF PRESENT ILLNESS   (Location/Symptom, Timing/Onset,Context/Setting, Quality, Duration, Modifying Factors, Severity)  Note limiting factors.   Noe Moctezuma is a 49 y.o. male who presents to the emergency department []     Nursing Notes were reviewed.    REVIEW OF SYSTEMS    (2-9systems for level 4, 10 or more for level 5)     Review of Systems    Except asnoted above the remainder of the review of systems was reviewed and negative.       PAST MEDICAL HISTORY     Past Medical History:   Diagnosis Date    Allergic rhinitis     spring allergies    Arthritis     hands    Cancer (HCC) 02/14/2024    colon cancer    Colitis     Diverticulitis     Hyperlipidemia          SURGICAL HISTORY       Past Surgical History:   Procedure Laterality Date    APPENDECTOMY  01/13/2023    COLONOSCOPY  2011    COLONOSCOPY  06/08/2020    [id number]    COLONOSCOPY  02/14/2024    Dr. Vasquez    CYSTOSCOPY Bilateral 2/28/2024    CYSTOSCOPY URETERAL STENT INSERTION, kim placement performed by Jos Ng MD at [facility] OR    HERNIA REPAIR  2007    LAPAROTOMY N/A 3/8/2024    Exploratory Laparotomy, Diverting Colostomy performed by Wale Fierro MD at [facility] OR    SIGMOID COLECTOMY N/A 2/28/2024    Robotic Left Sigmoid Colon Resection attempted, converted to open procedure performed by Caroline Covington MD at [facility] OR    SIGMOID COLECTOMY N/A 7/29/2024    Takedown Colostomy with Incisional Hernia Repair with Phasix Mesh performed by Caroline Covington MD at [facility] OR         CURRENT MEDICATIONS     Current

## 2025-02-24 ASSESSMENT — PATIENT HEALTH QUESTIONNAIRE - PHQ9
1. LITTLE INTEREST OR PLEASURE IN DOING THINGS: NOT AT ALL
8. MOVING OR SPEAKING SO SLOWLY THAT OTHER PEOPLE COULD HAVE NOTICED. OR THE OPPOSITE - BEING SO FIDGETY OR RESTLESS THAT YOU HAVE BEEN MOVING AROUND A LOT MORE THAN USUAL: NOT AT ALL
1. LITTLE INTEREST OR PLEASURE IN DOING THINGS: NOT AT ALL
SUM OF ALL RESPONSES TO PHQ QUESTIONS 1-9: 0
4. FEELING TIRED OR HAVING LITTLE ENERGY: NOT AT ALL
8. MOVING OR SPEAKING SO SLOWLY THAT OTHER PEOPLE COULD HAVE NOTICED. OR THE OPPOSITE, BEING SO FIGETY OR RESTLESS THAT YOU HAVE BEEN MOVING AROUND A LOT MORE THAN USUAL: NOT AT ALL
3. TROUBLE FALLING OR STAYING ASLEEP: NOT AT ALL
4. FEELING TIRED OR HAVING LITTLE ENERGY: NOT AT ALL
SUM OF ALL RESPONSES TO PHQ QUESTIONS 1-9: 0
3. TROUBLE FALLING OR STAYING ASLEEP: NOT AT ALL
7. TROUBLE CONCENTRATING ON THINGS, SUCH AS READING THE NEWSPAPER OR WATCHING TELEVISION: NOT AT ALL
2. FEELING DOWN, DEPRESSED OR HOPELESS: NOT AT ALL
5. POOR APPETITE OR OVEREATING: NOT AT ALL
6. FEELING BAD ABOUT YOURSELF - OR THAT YOU ARE A FAILURE OR HAVE LET YOURSELF OR YOUR FAMILY DOWN: NOT AT ALL
2. FEELING DOWN, DEPRESSED OR HOPELESS: NOT AT ALL
SUM OF ALL RESPONSES TO PHQ9 QUESTIONS 1 & 2: 0
SUM OF ALL RESPONSES TO PHQ QUESTIONS 1-9: 0
9. THOUGHTS THAT YOU WOULD BE BETTER OFF DEAD, OR OF HURTING YOURSELF: NOT AT ALL
7. TROUBLE CONCENTRATING ON THINGS, SUCH AS READING THE NEWSPAPER OR WATCHING TELEVISION: NOT AT ALL
5. POOR APPETITE OR OVEREATING: NOT AT ALL
10. IF YOU CHECKED OFF ANY PROBLEMS, HOW DIFFICULT HAVE THESE PROBLEMS MADE IT FOR YOU TO DO YOUR WORK, TAKE CARE OF THINGS AT HOME, OR GET ALONG WITH OTHER PEOPLE: NOT DIFFICULT AT ALL
6. FEELING BAD ABOUT YOURSELF - OR THAT YOU ARE A FAILURE OR HAVE LET YOURSELF OR YOUR FAMILY DOWN: NOT AT ALL
SUM OF ALL RESPONSES TO PHQ QUESTIONS 1-9: 0
SUM OF ALL RESPONSES TO PHQ QUESTIONS 1-9: 0
10. IF YOU CHECKED OFF ANY PROBLEMS, HOW DIFFICULT HAVE THESE PROBLEMS MADE IT FOR YOU TO DO YOUR WORK, TAKE CARE OF THINGS AT HOME, OR GET ALONG WITH OTHER PEOPLE: NOT DIFFICULT AT ALL
9. THOUGHTS THAT YOU WOULD BE BETTER OFF DEAD, OR OF HURTING YOURSELF: NOT AT ALL

## 2025-02-27 ENCOUNTER — OFFICE VISIT (OUTPATIENT)
Dept: FAMILY MEDICINE CLINIC | Age: 50
End: 2025-02-27
Payer: COMMERCIAL

## 2025-02-27 VITALS
DIASTOLIC BLOOD PRESSURE: 80 MMHG | WEIGHT: 229.3 LBS | HEART RATE: 70 BPM | BODY MASS INDEX: 31.06 KG/M2 | HEIGHT: 72 IN | SYSTOLIC BLOOD PRESSURE: 122 MMHG | OXYGEN SATURATION: 98 %

## 2025-02-27 DIAGNOSIS — F33.1 MODERATE EPISODE OF RECURRENT MAJOR DEPRESSIVE DISORDER (HCC): ICD-10-CM

## 2025-02-27 DIAGNOSIS — K51.211 ULCERATIVE PROCTITIS WITH RECTAL BLEEDING (HCC): ICD-10-CM

## 2025-02-27 DIAGNOSIS — Z85.038 H/O MALIGNANT NEOPLASM OF COLON: ICD-10-CM

## 2025-02-27 DIAGNOSIS — I10 PRIMARY HYPERTENSION: Primary | ICD-10-CM

## 2025-02-27 PROBLEM — Z43.3 COLOSTOMY CARE (HCC): Status: RESOLVED | Noted: 2024-04-02 | Resolved: 2025-02-27

## 2025-02-27 PROBLEM — Z93.3 S/P COLOSTOMY (HCC): Status: RESOLVED | Noted: 2024-03-08 | Resolved: 2025-02-27

## 2025-02-27 PROCEDURE — 99214 OFFICE O/P EST MOD 30 MIN: CPT | Performed by: FAMILY MEDICINE

## 2025-02-27 PROCEDURE — 3079F DIAST BP 80-89 MM HG: CPT | Performed by: FAMILY MEDICINE

## 2025-02-27 PROCEDURE — 3074F SYST BP LT 130 MM HG: CPT | Performed by: FAMILY MEDICINE

## 2025-02-27 RX ORDER — MESALAMINE 0.38 G/1
4 CAPSULE, EXTENDED RELEASE ORAL DAILY
COMMUNITY
Start: 2025-02-18

## 2025-02-27 RX ORDER — LISINOPRIL 20 MG/1
20 TABLET ORAL DAILY
Qty: 90 TABLET | Refills: 0 | Status: SHIPPED | OUTPATIENT
Start: 2025-02-27

## 2025-02-27 SDOH — ECONOMIC STABILITY: FOOD INSECURITY: WITHIN THE PAST 12 MONTHS, THE FOOD YOU BOUGHT JUST DIDN'T LAST AND YOU DIDN'T HAVE MONEY TO GET MORE.: NEVER TRUE

## 2025-02-27 SDOH — ECONOMIC STABILITY: FOOD INSECURITY: WITHIN THE PAST 12 MONTHS, YOU WORRIED THAT YOUR FOOD WOULD RUN OUT BEFORE YOU GOT MONEY TO BUY MORE.: NEVER TRUE

## 2025-02-27 SDOH — ECONOMIC STABILITY: INCOME INSECURITY: IN THE LAST 12 MONTHS, WAS THERE A TIME WHEN YOU WERE NOT ABLE TO PAY THE MORTGAGE OR RENT ON TIME?: NO

## 2025-02-27 SDOH — ECONOMIC STABILITY: TRANSPORTATION INSECURITY
IN THE PAST 12 MONTHS, HAS LACK OF TRANSPORTATION KEPT YOU FROM MEETINGS, WORK, OR FROM GETTING THINGS NEEDED FOR DAILY LIVING?: NO

## 2025-02-27 SDOH — ECONOMIC STABILITY: TRANSPORTATION INSECURITY
IN THE PAST 12 MONTHS, HAS THE LACK OF TRANSPORTATION KEPT YOU FROM MEDICAL APPOINTMENTS OR FROM GETTING MEDICATIONS?: NO

## 2025-02-27 ASSESSMENT — ENCOUNTER SYMPTOMS
WHEEZING: 0
COUGH: 0
SHORTNESS OF BREATH: 0
CHEST TIGHTNESS: 0

## 2025-02-27 NOTE — PROGRESS NOTES
[facility]  [address] [facility] [address]  Dept: [phone]  Dept Fax: [phone]  Loc: [phone]    Noe Moctezuma is a 49 y.o. male who presents today for his medical conditions/complaints as noted below.  Noe Moctezuma is c/o of   Chief Complaint   Patient presents with    Follow-up     [facility] ER 2/20/2025 HTN       HPI:     History of Present Illness  The patient is a 49-year-old male here today for an ER follow-up from high blood pressure.    He experienced severe headaches on Monday and Tuesday, coinciding with his colonoscopy for cancer screening on Tuesday. His blood pressure was recorded as 156 systolic during the procedure. On Wednesday, while at work, his blood pressure escalated to 180s over 110, prompting a visit to the emergency room where it was measured at 190s over 100s. Despite being prescribed lisinopril, his blood pressure remained elevated for three days. He reports feeling lethargic and occasionally sleepy since resuming lisinopril but has not experienced any recurrent severe headaches. He experienced dizziness on one occasion but has not had any subsequent episodes. He reports no peripheral edema, visual disturbances such as blurry vision, double vision, or new floaters, or cardiac symptoms such as palpitations, tachycardia, or bradycardia. He did experience some discomfort on the right side of his chest, which has since resolved. He has previously been on lisinopril without any associated cough. He reports feeling anxious for two weeks prior to his colonoscopy, but his anxiety levels have decreased post-procedure.        Past Medical History:   Diagnosis Date    Allergic rhinitis     spring allergies    Arthritis     hands    Cancer (HCC) 02/14/2024    colon cancer    Colitis     Diverticulitis     Hyperlipidemia      Past Surgical History:   Procedure Laterality Date    APPENDECTOMY

## 2025-03-07 RX ORDER — LISINOPRIL 20 MG/1
20 TABLET ORAL DAILY
Qty: 90 TABLET | Refills: 0 | Status: SHIPPED | OUTPATIENT
Start: 2025-03-07

## 2025-03-07 NOTE — TELEPHONE ENCOUNTER
Noe called requesting a refill of the below medication which has been pended for you:     Requested Prescriptions     Pending Prescriptions Disp Refills    lisinopril (PRINIVIL;ZESTRIL) 20 MG tablet [Pharmacy Med Name: LISINOPRIL 20MG TABLETS] 90 tablet 0     Sig: TAKE 1 TABLET BY MOUTH DAILY       Last Appointment Date: 2/27/2025  Next Appointment Date: 5/30/2025    No Known Allergies

## 2025-05-06 ENCOUNTER — OFFICE VISIT (OUTPATIENT)
Dept: FAMILY MEDICINE CLINIC | Age: 50
End: 2025-05-06
Payer: COMMERCIAL

## 2025-05-06 VITALS
BODY MASS INDEX: 31.56 KG/M2 | DIASTOLIC BLOOD PRESSURE: 70 MMHG | WEIGHT: 233 LBS | OXYGEN SATURATION: 96 % | HEIGHT: 72 IN | SYSTOLIC BLOOD PRESSURE: 112 MMHG | HEART RATE: 60 BPM

## 2025-05-06 DIAGNOSIS — M54.32 SCIATICA OF LEFT SIDE: Primary | ICD-10-CM

## 2025-05-06 PROCEDURE — 99213 OFFICE O/P EST LOW 20 MIN: CPT | Performed by: STUDENT IN AN ORGANIZED HEALTH CARE EDUCATION/TRAINING PROGRAM

## 2025-05-06 PROCEDURE — 3074F SYST BP LT 130 MM HG: CPT | Performed by: STUDENT IN AN ORGANIZED HEALTH CARE EDUCATION/TRAINING PROGRAM

## 2025-05-06 PROCEDURE — 3078F DIAST BP <80 MM HG: CPT | Performed by: STUDENT IN AN ORGANIZED HEALTH CARE EDUCATION/TRAINING PROGRAM

## 2025-05-06 RX ORDER — CYCLOBENZAPRINE HCL 10 MG
10 TABLET ORAL 3 TIMES DAILY PRN
Qty: 30 TABLET | Refills: 0 | Status: SHIPPED | OUTPATIENT
Start: 2025-05-06 | End: 2025-05-16

## 2025-05-06 NOTE — PROGRESS NOTES
[facility]  [address] [facility] [address]  Dept: [phone]  Dept Fax: [phone]  Loc: [phone]      Noe Moctezuma is a 49 y.o. male who presents today for:  Chief Complaint   Patient presents with    Back Pain     Left side and down left leg no injury          Goals    None         HPI:     HPI  Patient is a 49-year-old male presents the office today for follow-up evaluation for sciatica.  Sciatica has been improving with a home exercise program.  At today's visit states that his symptoms are mostly resolved.  Would like to get a refill on his muscle relaxer at this time.  Had been using Flexeril.    Past Medical History:   Diagnosis Date    Allergic rhinitis     spring allergies    Arthritis     hands    Cancer (HCC) 02/14/2024    colon cancer    Colitis     Diverticulitis     Hyperlipidemia       Past Surgical History:   Procedure Laterality Date    APPENDECTOMY  01/13/2023    COLONOSCOPY  2011    COLONOSCOPY  06/08/2020    [id number]    COLONOSCOPY  02/14/2024    Dr. Vasquez    COLONOSCOPY  02/18/2025    Dr. Vasquez    CYSTOSCOPY Bilateral 02/28/2024    CYSTOSCOPY URETERAL STENT INSERTION, kim placement performed by Jos Ng MD at [facility] OR    HERNIA REPAIR  2007    LAPAROTOMY N/A 03/08/2024    Exploratory Laparotomy, Diverting Colostomy performed by Wale Fierro MD at [facility] OR    SIGMOID COLECTOMY N/A 02/28/2024    Robotic Left Sigmoid Colon Resection attempted, converted to open procedure performed by Caroline Covington MD at [facility] OR    SIGMOID COLECTOMY N/A 07/29/2024    Takedown Colostomy with Incisional Hernia Repair with Phasix Mesh performed by Caroline Covington MD at [facility] OR     Family History   Problem Relation Age of Onset    Lymphoma Mother     High Cholesterol Father     Colon Polyps Father     Lymphoma Brother     Cancer Paternal Grandmother         Uknown    Early

## 2025-05-08 ASSESSMENT — ENCOUNTER SYMPTOMS: BACK PAIN: 1

## 2025-05-22 NOTE — INTERVAL H&P NOTE
Update History & Physical    The patient's History and Physical of February 28, 2024 was reviewed with the patient and I examined the patient. There was no change. The surgical site was confirmed by the patient and me.     Plan: The risks, benefits, expected outcome, and alternative to the recommended procedure have been discussed with the patient. Patient understands and wants to proceed with the procedure.     Electronically signed by Jos Ng MD on 2/28/2024 at 7:38 AM       Keily Evans  Pediatric Cardiology  [address] [place] [address], [address] Entrance [address] [place] [id number]  Phone: [phone]  Fax: [phone]  Follow Up Time:

## 2025-06-02 ENCOUNTER — OFFICE VISIT (OUTPATIENT)
Dept: FAMILY MEDICINE CLINIC | Age: 50
End: 2025-06-02
Payer: COMMERCIAL

## 2025-06-02 VITALS
DIASTOLIC BLOOD PRESSURE: 72 MMHG | HEIGHT: 72 IN | BODY MASS INDEX: 31.29 KG/M2 | WEIGHT: 231 LBS | SYSTOLIC BLOOD PRESSURE: 116 MMHG | OXYGEN SATURATION: 96 % | HEART RATE: 74 BPM

## 2025-06-02 DIAGNOSIS — I10 PRIMARY HYPERTENSION: Primary | ICD-10-CM

## 2025-06-02 DIAGNOSIS — C18.9 MALIGNANT NEOPLASM OF COLON, UNSPECIFIED PART OF COLON (HCC): ICD-10-CM

## 2025-06-02 DIAGNOSIS — E78.2 MIXED HYPERLIPIDEMIA: ICD-10-CM

## 2025-06-02 DIAGNOSIS — M62.08 DIASTASIS RECTI: ICD-10-CM

## 2025-06-02 PROCEDURE — 99214 OFFICE O/P EST MOD 30 MIN: CPT | Performed by: FAMILY MEDICINE

## 2025-06-02 PROCEDURE — 3074F SYST BP LT 130 MM HG: CPT | Performed by: FAMILY MEDICINE

## 2025-06-02 PROCEDURE — 3078F DIAST BP <80 MM HG: CPT | Performed by: FAMILY MEDICINE

## 2025-06-02 RX ORDER — LISINOPRIL 10 MG/1
10 TABLET ORAL DAILY
Qty: 90 TABLET | Refills: 1
Start: 2025-06-02

## 2025-06-02 ASSESSMENT — ENCOUNTER SYMPTOMS
DIARRHEA: 0
WHEEZING: 0
BLOOD IN STOOL: 0
ABDOMINAL DISTENTION: 1
ABDOMINAL PAIN: 0
COUGH: 0
CHEST TIGHTNESS: 0
NAUSEA: 0
CONSTIPATION: 0
SHORTNESS OF BREATH: 0

## 2025-06-02 NOTE — PROGRESS NOTES
is alert and oriented to person, place, and time.   Psychiatric:         Mood and Affect: Mood normal.         Behavior: Behavior normal.         Thought Content: Thought content normal.         Judgment: Judgment normal.       /72 (BP Site: Right Upper Arm, Patient Position: Sitting, BP Cuff Size: Medium Adult)   Pulse 74   Ht 1.829 m (6')   Wt 104.8 kg (231 lb)   SpO2 96%   BMI 31.33 kg/m²     Assessment:      Diagnosis Orders   1. Primary hypertension  Comprehensive Metabolic Panel      2. Malignant neoplasm of colon, unspecified part of colon (HCC)        3. Diastasis recti        4. Mixed hyperlipidemia  Lipid Panel              Results      Plan:   Assessment & Plan   Assessment & Plan  1. Hypertension.  - Blood pressure readings have been within the normal range, with the latest reading at 116/72.  - Reports experiencing dizziness upon standing, likely due to the current dosage of lisinopril.  - Dosage of lisinopril will be reduced from 20 mg to 10 mg. He is advised to utilize his remaining 10 mg tablets and halve the 20 mg tablets until they are depleted.  - Laboratory tests will be ordered for further evaluation. If any significant changes in blood pressure readings are observed at home, he should inform promptly for an appropriate plan.    2. Diastasis recti.  - Presents with a bulge at the top of the abdomen, noticeable during activities such as coughing and singing.  - Physical examination reveals a distinct bulge, consistent with diastasis recti rather than a hernia.  - Reassured that this condition does not typically require surgical intervention.  - Advised to engage in core strengthening exercises to help reduce the bulge.    Follow-up  - Follow-up appointment scheduled for 6 months.  Return in about 6 months (around 12/2/2025) for HTN follow up.    Orders Placed This Encounter   Procedures    Lipid Panel     Standing Status:   Future     Expected Date:   6/2/2025     Expiration Date:

## 2025-07-03 ENCOUNTER — OFFICE VISIT (OUTPATIENT)
Dept: SURGERY | Age: 50
End: 2025-07-03
Payer: COMMERCIAL

## 2025-07-03 VITALS
HEART RATE: 57 BPM | DIASTOLIC BLOOD PRESSURE: 82 MMHG | RESPIRATION RATE: 18 BRPM | OXYGEN SATURATION: 97 % | BODY MASS INDEX: 30.88 KG/M2 | TEMPERATURE: 97.3 F | SYSTOLIC BLOOD PRESSURE: 124 MMHG | HEIGHT: 72 IN | WEIGHT: 228 LBS

## 2025-07-03 DIAGNOSIS — K43.2 INCISIONAL HERNIA, WITHOUT OBSTRUCTION OR GANGRENE: Primary | ICD-10-CM

## 2025-07-03 PROCEDURE — 99212 OFFICE O/P EST SF 10 MIN: CPT | Performed by: SURGERY

## 2025-07-03 PROCEDURE — 3079F DIAST BP 80-89 MM HG: CPT | Performed by: SURGERY

## 2025-07-03 PROCEDURE — 3074F SYST BP LT 130 MM HG: CPT | Performed by: SURGERY

## 2025-07-07 NOTE — PROGRESS NOTES
[facility]  General Surgery  Clinic  Note    Pt Name: Noe Moctezuma  Medical Record Number: [id number]  Date of Birth 1975   Today's Date: 07/03/2025      ASSESSMENT/ PLAN:     Assessment & Plan  1. Hernia.  Confirmed by physical examination, the hernia is causing discomfort but no significant pain. The patient prefers to delay treatment until the end of summer due to work commitments. A referral will be made to a specialist who focuses exclusively on complex hernia repairs. It is recommended to monitor for any changes, such as the hernia becoming hard or causing significant discomfort, and to seek immediate medical attention if these occur. Risks, benefits, and alternatives of hernia repair were discussed, including the use of mesh and the importance of having the procedure done by a specialist in complex hernia repairs to ensure the best outcome. The patient understands that while the hernia is not currently emergent, timely intervention is crucial to prevent complications.         SUBJECTIVE     History of Present Illness  The patient presents for a hernia.    He has been diagnosed with diastasis by his primary care physician. He reports a sensation of fluid or air when pressure is applied to a specific area of his abdomen, which he describes as numb. He experiences intermittent discomfort but does not report any pain. He is currently in the process of identifying dietary triggers that may exacerbate his condition. He retains his gallbladder and does not experience any right upper quadrant pain.    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            CURRENT MEDICATIONS     Current Outpatient Medications on File Prior to Visit   Medication Sig Dispense Refill    lisinopril (PRINIVIL;ZESTRIL) 10 MG tablet Take 1 tablet by mouth daily 90 tablet 1    mesalamine (APRISO) 0.375 g extended release capsule Take 4

## 2025-07-08 DIAGNOSIS — K43.2 INCISIONAL HERNIA, WITHOUT OBSTRUCTION OR GANGRENE: Primary | ICD-10-CM

## 2025-07-09 ENCOUNTER — TELEPHONE (OUTPATIENT)
Dept: SURGERY | Age: 50
End: 2025-07-09

## (undated) DEVICE — ADHESIVE SKIN CLOSURE WND 8.661X1.5 IN 22 CM LIQUIBAND SECUR

## (undated) DEVICE — SUTURE VICRYL + SZ 0 L18IN ABSRB TIE VCP106G

## (undated) DEVICE — GLOVE ORANGE PI 7   MSG9070

## (undated) DEVICE — BASIC SINGLE BASIN BTC-LF: Brand: MEDLINE INDUSTRIES, INC.

## (undated) DEVICE — SOLUTION PREP PAINT POV IOD FOR SKIN MUCOUS MEM

## (undated) DEVICE — PAD GEN USE BORDERED ADH 14IN 2IN AND 12IN 4IN GZ UNIV ST

## (undated) DEVICE — SEALANT TISS 10 ML FIBRIN VISTASEAL

## (undated) DEVICE — LIQUIBAND RAPID ADHESIVE 36/CS 0.8ML: Brand: MEDLINE

## (undated) DEVICE — SHEET, T, LAPAROTOMY, STERILE: Brand: MEDLINE

## (undated) DEVICE — REDUCER: Brand: ENDOWRIST

## (undated) DEVICE — SOLUTION SURG PREP 26 CC PURPREP

## (undated) DEVICE — SUTURE PROL SZ 2-0 L36IN NONABSORBABLE BLU SH L26MM 1/2 CIR 8523H

## (undated) DEVICE — STAPLER INT SZ 31MM H1.5-2.2MM OPN LEG L5.2MM PWR ADJ HT W/

## (undated) DEVICE — LARGE, DISPOSABLE ALEXIS O C-SECTION PROTECTOR - RETRACTOR: Brand: ALEXIS ® O C-SECTION PROTECTOR - RETRACTOR

## (undated) DEVICE — TROCAR: Brand: KII FIOS FIRST ENTRY

## (undated) DEVICE — PUMP SUC IRR TBNG L10FT W/ HNDPC ASSEMB STRYKEFLOW 2

## (undated) DEVICE — EVACUATOR SURG 100CC SIL BLB SUCT RESVR FOR CLS WND DRNGE

## (undated) DEVICE — APPLIER LIG CLP M L11IN TI STR RNG HNDL FOR 20 CLP DISP

## (undated) DEVICE — DRAPE,ROBOTICS,STERILE: Brand: MEDLINE

## (undated) DEVICE — PENCIL SMK EVAC ALL IN 1 DSGN ENH VISIBILITY IMPROVED AIR

## (undated) DEVICE — DISSECTOR ENDO L13CM CVD JAW CRDLSS SONICISION

## (undated) DEVICE — SPONGE LAP W18XL18IN WHT COT 4 PLY FLD STRUNG RADPQ DISP ST 2 PER PACK

## (undated) DEVICE — SYRINGE MED 30ML STD CLR PLAS LUERLOCK TIP N CTRL DISP

## (undated) DEVICE — STAPLER INT STPL LN H1.8-4.8XL60MM THCK TISS 2 ROW 8 FIRING

## (undated) DEVICE — PACK,LAPAROSCOPY,PK II,SIRUS: Brand: MEDLINE

## (undated) DEVICE — SEAL

## (undated) DEVICE — APPLICATOR MEDICATED 26 CC SOLUTION HI LT ORNG CHLORAPREP

## (undated) DEVICE — RELOAD STPL L60MM H1.5-3.6MM REG TISS BLU GRIPPING SURF B

## (undated) DEVICE — SPONGE GZ W4XL4IN COT 12 PLY TYP VII WVN C FLD DSGN STERILE

## (undated) DEVICE — DRAIN SURG 19FR 0.25IN SIL RND W/ TRCR INDIC DOT RADPQ FULL

## (undated) DEVICE — PENCIL SMK EVAC 15FT BLADE ELECTRD ROCKER F/TELSCP

## (undated) DEVICE — TROCAR: Brand: KII SHIELDED BLADED ACCESS SYSTEM

## (undated) DEVICE — BREAST HERNIA: Brand: MEDLINE INDUSTRIES, INC.

## (undated) DEVICE — SUTURE VCRL + SZ 2-0 L27IN ABSRB WHT SH 1/2 CIR TAPERCUT VCP417H

## (undated) DEVICE — RELOAD STPL L75MM OPN H3.8MM CLS 1.5MM WIRE DIA0.2MM REG

## (undated) DEVICE — SUTURE STRATAFIX SYMMETRIC SZ 1 L18IN ABSRB VLT CT1 L36CM SXPP1A404

## (undated) DEVICE — BLADE,CARBON-STEEL,11,STRL,DISPOSABLE,TB: Brand: MEDLINE

## (undated) DEVICE — COLUMN DRAPE

## (undated) DEVICE — TUBING INSUFFLATOR HEAT HUMIDIFIED SMK EVAC SET PNEUMOCLEAR

## (undated) DEVICE — Device: Brand: SENSURA MIO

## (undated) DEVICE — SOLUTION SCRB 4OZ 7.5% POVIDONE IOD ANTIMIC BTL

## (undated) DEVICE — BLADE,CARBON-STEEL,10,STRL,DISPOSABLE,TB: Brand: MEDLINE

## (undated) DEVICE — SUTURE MONOCRYL + ABSORBABLE MONOFILAMENT 3-0 PS-2 27 IN UD SXMP1B109

## (undated) DEVICE — SUTURE VCRL + SZ 0 L18IN ABSRB TIE VCP106G

## (undated) DEVICE — SET UP: Brand: MEDLINE INDUSTRIES, INC.

## (undated) DEVICE — YANKAUER,BULB TIP,W/O VENT,RIGID,STERILE: Brand: MEDLINE

## (undated) DEVICE — TUBING, SUCTION, 1/4" X 20', STRAIGHT: Brand: MEDLINE INDUSTRIES, INC.

## (undated) DEVICE — BLADE ES L6IN ELASTOMERIC COAT EXT DURABLE BEND UPTO 90DEG

## (undated) DEVICE — SYRINGE CATH TIP 50ML

## (undated) DEVICE — STAPLER 60MM POWERED ECHELON 3000  SHORT 340MM

## (undated) DEVICE — MASTISOL ADHESIVE LIQ 2/3ML

## (undated) DEVICE — STRIP,CLOSURE,WOUND,MEDI-STRIP,1/2X4: Brand: MEDLINE

## (undated) DEVICE — GENERAL LAPAROSCOPY-LF: Brand: MEDLINE INDUSTRIES, INC.

## (undated) DEVICE — SUTURE VCRL + SZ 4-0 L27IN ABSRB WHT FS-2 3/8 CIR REV CUT VCP422H

## (undated) DEVICE — SUTURE PERMAHAND SZ 2-0 L18IN NONABSORBABLE BLK L26MM SH C012D

## (undated) DEVICE — PREMIUM DRY TRAY LF: Brand: MEDLINE INDUSTRIES, INC.

## (undated) DEVICE — SUTURE VICRYL + SZ 2-0 L27IN ABSRB CLR CT-1 1/2 CIR TAPERCUT VCP259H

## (undated) DEVICE — SYRINGE IRRIG 60ML SFT PLIABLE BLB EZ TO GRP 1 HND USE W/

## (undated) DEVICE — 450 ML BOTTLE OF 0.05% CHLORHEXIDINE GLUCONATE IN 99.95% STERILE WATER FOR IRRIGATION, USP AND APPLICATOR.: Brand: IRRISEPT ANTIMICROBIAL WOUND LAVAGE

## (undated) DEVICE — 40586 ADVANCED TRENDELENBURG POSITIONING KIT: Brand: 40586 ADVANCED TRENDELENBURG POSITIONING KIT

## (undated) DEVICE — YANKAUER,POOLE TIP,STERILE,50/CS: Brand: MEDLINE

## (undated) DEVICE — BINDER ABD UNISX 4 PNL PREM 6INX6INX12IN L XL 4

## (undated) DEVICE — GUIDEWIRE ENDOSCP L150CM DIA0.035IN TIP L15CM BENT PTFE

## (undated) DEVICE — APPLIER CLP L L13IN TI MULT RNG HNDL 20 CLP STR LIGACLP

## (undated) DEVICE — 1LYRTR 16FR10ML100%SILTMPS SNP: Brand: MEDLINE INDUSTRIES, INC.

## (undated) DEVICE — STAPLER INT L75MM CUT LN L73MM STPL LN L77MM BLU B FRM 8

## (undated) DEVICE — CLEAN CLOSURE PACK: Brand: MEDLINE INDUSTRIES, INC.

## (undated) DEVICE — SEALER ENDOSCP NANO COAT OPN DIV CRV L JAW LIGASURE IMPACT

## (undated) DEVICE — STAPLER INT DIA29MM CLS STPL H1.5-2.2MM OPN LEG L5.2MM 26

## (undated) DEVICE — SUTURE VICRYL + SZ 3-0 L27IN ABSRB UD L26MM SH 1/2 CIR VCP416H

## (undated) DEVICE — GOWN,SIRUS,NONRNF,SETINSLV,XL,20/CS: Brand: MEDLINE

## (undated) DEVICE — SUTURE VICRYL + SZ 2-0 L27IN ABSRB WHT SH 1/2 CIR TAPERCUT VCP417H

## (undated) DEVICE — BLADELESS OBTURATOR: Brand: WECK VISTA

## (undated) DEVICE — TOWEL,OR,DSP,ST,WHITE,DLX,XR,4/PK,20PK/C: Brand: MEDLINE

## (undated) DEVICE — HYPODERMIC SAFETY NEEDLE: Brand: MAGELLAN

## (undated) DEVICE — GLOVE ORANGE PI 7 1/2   MSG9075

## (undated) DEVICE — ELECTRO LUBE IS A SINGLE PATIENT USE DEVICE THAT IS INTENDED TO BE USED ON ELECTROSURGICAL ELECTRODES TO REDUCE STICKING.: Brand: KEY SURGICAL ELECTRO LUBE

## (undated) DEVICE — DRAIN CHN 19FR L0.25IN DIA6.3MM SIL RND HUBLESS FULL FLUT

## (undated) DEVICE — ARM DRAPE

## (undated) DEVICE — GOWN,SIRUS,NON REINFRCD,LARGE,SET IN SL: Brand: MEDLINE

## (undated) DEVICE — INSUFFLATION NEEDLE TO ESTABLISH PNEUMOPERITONEUM.: Brand: INSUFFLATION NEEDLE

## (undated) DEVICE — SUTURE VICRYL + SZ 0 L27IN ABSRB VLT L36MM CT-1 1/2 CIR VCPB260H

## (undated) DEVICE — TIP COVER ACCESSORY

## (undated) DEVICE — VESSEL SEALER EXTEND: Brand: ENDOWRIST

## (undated) DEVICE — RESERVOIR,SUCTION,100CC,SILICONE: Brand: MEDLINE

## (undated) DEVICE — SUTURE PDS II SZ 1 L36IN ABSRB VLT L48MM CTX 1/2 CIR Z371T

## (undated) DEVICE — CATHETER URET 5FR L70CM OPN END SGL LUMN INJ HUB FLEXIMA

## (undated) DEVICE — SOLUTION ANTIFOG VIS SYS CLEARIFY LAPSCP

## (undated) DEVICE — SUTURE VCRL + SZ 3-0 L27IN ABSRB UD L26MM SH 1/2 CIR VCP416H